# Patient Record
Sex: FEMALE | Race: WHITE | NOT HISPANIC OR LATINO | Employment: OTHER | RURAL
[De-identification: names, ages, dates, MRNs, and addresses within clinical notes are randomized per-mention and may not be internally consistent; named-entity substitution may affect disease eponyms.]

---

## 2021-06-03 ENCOUNTER — TRANSCRIBE ORDERS (OUTPATIENT)
Dept: PHYSICAL THERAPY | Facility: CLINIC | Age: 31
End: 2021-06-03

## 2021-06-03 DIAGNOSIS — M54.2 CERVICALGIA: Primary | ICD-10-CM

## 2021-06-03 DIAGNOSIS — M54.6 PAIN IN THORACIC SPINE: ICD-10-CM

## 2021-06-17 ENCOUNTER — TREATMENT (OUTPATIENT)
Dept: PHYSICAL THERAPY | Facility: CLINIC | Age: 31
End: 2021-06-17

## 2021-06-17 DIAGNOSIS — M54.6 PAIN IN THORACIC SPINE: ICD-10-CM

## 2021-06-17 DIAGNOSIS — M54.2 CERVICALGIA: Primary | ICD-10-CM

## 2021-06-17 PROCEDURE — 97162 PT EVAL MOD COMPLEX 30 MIN: CPT | Performed by: PHYSICAL THERAPIST

## 2021-06-17 NOTE — PROGRESS NOTES
Physical Therapy Initial Evaluation and Plan of Care      Patient: Emili Caal   : 1990  Diagnosis/ICD-10 Code:  Cervicalgia [M54.2]  Referring practitioner: Bj Duckworth MD  Date of Initial Visit: 2021  Today's Date: 2021  Patient seen for 1 sessions    Recertification: 2021   Next MD appt: TBD.         Subjective Questionnaire: NDI:25/50 = 50%  Oswestry: 28/50 = 56%      Subjective Evaluation    History of Present Illness  Onset date: Chronic, years.  Mechanism of injury: No known injury.    Subjective comment: Patient reprots tshe has had trouble with her neck and upper back for sometime. She reports she has trouble with her shoulders and neck. She rpeorts sleeping is the worst. She reprots she gets numbness in the dorsal arm. She reports that the R side is worse than the L. She reports leaning to the L is the worse.. She reprots  Patient Occupation: [employer]  Current pain rating: 3  At best pain rating: 3  At worst pain rating: 10  Location: neck and thoracic  Quality: tight  Relieving factors: heat  Aggravating factors: sleeping and prolonged positioning  Progression: worsening    Social Support  Lives in: apartment    Hand dominance: right    Diagnostic Tests  No diagnostic tests performed  Abnormal MRI: Low back only.    Treatments  No previous or current treatments  Patient Goals  Patient goals for therapy: decreased pain  Patient goal: To be able to get my MRI           Objective          Static Posture     Head  Forward.    Shoulders  Rounded.    Thoracic Spine  Hyperkyphosis.    Postural Observations  Seated posture: poor  Standing posture: poor        Palpation   Left   Hypertonic in the levator scapulae and upper trapezius.   Tenderness of the levator scapulae, suboccipitals and upper trapezius.     Right   Hypertonic in the levator scapulae and upper trapezius. Tenderness of the levator scapulae, suboccipitals and upper trapezius.     Tenderness    Cervical Spine   Tenderness in the pedicle.     Additional Tenderness Details  C7- very predominate with significant hump    Neurological Testing     Sensation   Cervical/Thoracic   Left   Intact: light touch    Right   Intact: light touch    Active Range of Motion   Cervical/Thoracic Spine   Cervical    Flexion: 20 degrees   Extension: 50 degrees   Left lateral flexion: 30 degrees   Right lateral flexion: 30 degrees   Left rotation: 55 degrees   Right rotation: 55 degrees     Strength/Myotome Testing   Cervical Spine   Neck extension: 4-  Neck flexion: 4-    Left   Neck lateral flexion (C3): 4-    Right   Neck lateral flexion (C3): 4-    Left Shoulder     Planes of Motion   Flexion: 5   Abduction: 5     Right Shoulder     Planes of Motion   Flexion: 5   Abduction: 5     Left Elbow   Flexion: 5  Extension: 5    Right Elbow   Flexion: 5  Extension: 5    Tests   Cervical     Left   Negative alar ligament integrity, active compression ([place]), cervical distraction, Spurling's sign and transverse ligament.     Right   Negative alar ligament integrity, active compression ([place]), cervical distraction, Spurling's sign and transverse ligament.     Ambulation     Comments   FWB, non-antalgic gait, no distress, no assistive device, no significant gait deviation, normal arm swing with gait.          Assessment & Plan     Assessment  Impairments: abnormal or restricted ROM, activity intolerance, impaired physical strength, lacks appropriate home exercise program and pain with function  Assessment details: Patient has chronic neck and upper back pain for years with poor overall posture. She has some mild tightness. In B UT and LS. UT >LS. She has poor overall posture and ergonomics as mainn issues. No laxiy in c-spine noted. Patient's insurance requires authorization prior to treatment beginning. Small HEP was established.  Prognosis: fair  Prognosis details: Barriers to Rehab: Include significant or possible  arthritic/degenerative changes that have occurred within the spine, The chronicity of this issue, The patient's obesity.    Safety Issues: None noted.    Functional Limitations: lifting, sleeping, pulling, pushing, uncomfortable because of pain, sitting and reaching overhead  Goals  Plan Goals: Short Term Goals:  1) I with HEP and have additions/changes by next re-certification.    2) Patient to be more aware of posture and posture correction technique.    3) AROM cervical flexion/ >=45°.    4) AROM B cervical SB >= 40°.    5) AROM B cervical ROT >=65°.    6) C-spine >= 4/5.      Long Term Goals:  1) AROM for the cervical spine all WNL, no increase in pain, ROT >= 75°.    2) C-spine 5/5, no increase in pain.    3) Patient able to perform 10 minutes of scapular stabilization exercises with good posture and no cueing to correct.    4) Patient able to control s/s with there ex.    5) NDI score of <= 34%.    6) patient able to show proper desk ergonomics and set up.    7) I with final HEP.        Plan  Therapy options: will be seen for skilled physical therapy services  Planned modality interventions: cryotherapy and thermotherapy (hydrocollator packs)  Planned therapy interventions: flexibility, body mechanics training, home exercise program, joint mobilization, manual therapy, neuromuscular re-education, postural training, soft tissue mobilization, spinal/joint mobilization, strengthening, stretching and therapeutic activities  Duration in visits: 10  Treatment plan discussed with: patient  Plan details: Progress overall ROM, strength, posture, scap stab, ergonomics, and activity tolerance.      Other therapeutic activities and/or exercises will be prescribed depending on the patients progress or lack there of.     Visit Diagnoses:    ICD-10-CM ICD-9-CM   1. Cervicalgia  M54.2 723.1   2. Pain in thoracic spine  M54.6 724.1       Timed:  Manual Therapy:         mins  43093;  Therapeutic Exercise:         mins  24414;       Neuromuscular Felicia:        mins  27702;    Therapeutic Activity:          mins  31803;     Gait Training:           mins  82584;     Ultrasound:          mins  83470;    Paraffin:        mins  17054;  Electrical Stimulation:         mins  46507 ( );    Untimed:  Electrical Stimulation:         mins  08502 ( );  Mechanical Traction:         mins  35002;     Timed Treatment:      mins   Total Treatment:     32   mins    PT SIGNATURE: Savana Bethea PT DPT, CSCS   DATE TREATMENT INITIATED: 6/17/2021    Initial Certification  Certification Period: 9/15/2021  I certify that the therapy services are furnished while this patient is under my care.  The services outlined above are required by this patient, and will be reviewed every 90 days.     PHYSICIAN: Bj Duckworth MD      DATE:     Please sign and return via fax to  .. Thank you, [facility].        This document has been electronically signed by Savana Bethea PT DPT, CSCS on June 17, 2021 09:26 CDT

## 2021-06-22 ENCOUNTER — TREATMENT (OUTPATIENT)
Dept: PHYSICAL THERAPY | Facility: CLINIC | Age: 31
End: 2021-06-22

## 2021-06-22 DIAGNOSIS — M54.6 PAIN IN THORACIC SPINE: ICD-10-CM

## 2021-06-22 DIAGNOSIS — M54.2 CERVICALGIA: Primary | ICD-10-CM

## 2021-06-22 PROCEDURE — 97530 THERAPEUTIC ACTIVITIES: CPT | Performed by: PHYSICAL THERAPIST

## 2021-06-22 PROCEDURE — 97110 THERAPEUTIC EXERCISES: CPT | Performed by: PHYSICAL THERAPIST

## 2021-06-22 NOTE — PROGRESS NOTES
Physical Therapy Daily Progress Note      Patient: Emili Caal   : 1990  Referring practitioner: Bj Duckworth MD  Date of Initial Visit: Type: THERAPY  Noted: 2021  Today's Date: 2021  Patient seen for 2 sessions    Recertification: 2021   Next MD appt: AFUAYoni Caal reports: soreness today.       Subjective Evaluation    History of Present Illness    Subjective comment: Pt relates feels soreness on R lateral neck, upper thoracic region.  States warm shower gives some pain relief.  Relates doin gHEP each day. Pain  Current pain rating: 3           Objective   See Exercise, Manual, and Modality Logs for complete treatment.       Assessment & Plan     Assessment  Assessment details: Pt gave good effort, motivated.     Pt performed all exercises and techniques without c/o increased pain.     Pt was given WHEP of exercises performed today. Pt related understanding of exercises, precautions, and progression parameters.     Goals  Plan Goals: Goals:  Short Term Goals:  1) I with HEP and have additions/changes by next re-certification.    2) Patient to be more aware of posture and posture correction technique.    3) AROM cervical flexion/ >=45°.    4) AROM B cervical SB >= 40°.    5) AROM B cervical ROT >=65°.    6) C-spine >= 4/5.      Long Term Goals:  1) AROM for the cervical spine all WNL, no increase in pain, ROT >= 75°.    2) C-spine 5/5, no increase in pain.    3) Patient able to perform 10 minutes of scapular stabilization exercises with good posture and no cueing to correct.    4) Patient able to control s/s with there ex.    5) NDI score of <= 34%.    6) patient able to show proper desk ergonomics and set up.    7) I with final HEP.        Visit Diagnoses:    ICD-10-CM ICD-9-CM   1. Cervicalgia  M54.2 723.1       Progress per Plan of Care           Timed:  Manual Therapy:         mins  07761;  Therapeutic Exercise:    25     mins  32619;     Neuromuscular Felicia:         mins  21778;    Therapeutic Activity:     10     mins  36258;     Gait Training:           mins  40503;     Ultrasound:          mins  12365;    Paraffin:        mins  15373;  Electrical Stimulation:         mins  58177 ( );    Untimed:  Electrical Stimulation:         mins  87395 ( );  Mechanical Traction:         mins  34320;     Timed Treatment:   40   mins   Total Treatment:     45   mins    Mary Barker PT  Physical Therapist        This document has been electronically signed by Mary Barker PT on June 22, 2021 17:09 CDT

## 2021-06-24 ENCOUNTER — TREATMENT (OUTPATIENT)
Dept: PHYSICAL THERAPY | Facility: CLINIC | Age: 31
End: 2021-06-24

## 2021-06-24 DIAGNOSIS — M54.2 CERVICALGIA: Primary | ICD-10-CM

## 2021-06-24 DIAGNOSIS — M54.6 PAIN IN THORACIC SPINE: ICD-10-CM

## 2021-06-24 PROCEDURE — 97110 THERAPEUTIC EXERCISES: CPT | Performed by: PHYSICAL THERAPIST

## 2021-06-24 NOTE — PROGRESS NOTES
"   Physical Therapy Daily Progress Note      Patient: Emili Caal   : 1990  Referring practitioner: Bj Duckwroth MD  Date of Initial Visit: Type: THERAPY  Noted: 2021  Today's Date: 2021  Patient seen for 3 sessions           Emili Caal reports: feeling better.          Subjective Evaluation    History of Present Illness    Subjective comment: States stiff this morning, just woke up.  States HEP going well. After visit today, pt relates feel sbetter \" than when i came in \". Pain  Current pain rating: 3           Objective   See Exercise, Manual, and Modality Logs for complete treatment.       Assessment & Plan     Assessment  Assessment details: Good effort, motivated.     Pt performed stabilization exercise well, needed Vc/MC for form and technique, but improved as practiced.       Improved posture.     Pt compliant with HEP.    Goals  Plan Goals:   Goals    Short Term Goals:  1) I with HEP and have additions/changes by next re-certification.(ONGOING)    2) Patient to be more aware of posture and posture correction technique. (ONGOING)    3) AROM cervical flexion/ >=45°.(MET)    4) AROM B cervical SB >= 40°.    5) AROM B cervical ROT >=65°.    6) C-spine >= 4/5.      Long Term Goals:  1) AROM for the cervical spine all WNL, no increase in pain, ROT >= 75°.    2) C-spine 5/5, no increase in pain.    3) Patient able to perform 10 minutes of scapular stabilization exercises with good posture and no cueing to correct. (PROGRESSING)    4) Patient able to control s/s with ther ex. (ONGOING)    5) NDI score of <= 34%.    6) patient able to show proper desk ergonomics and set up.    7) I with final HEP.    Plan  Plan details: Progress HEP.         Visit Diagnoses:    ICD-10-CM ICD-9-CM   1. Cervicalgia  M54.2 723.1   2. Pain in thoracic spine  M54.6 724.1       Progress per Plan of Care           Timed:  Manual Therapy:         mins  20726;  Therapeutic Exercise:    35     mins  73702;   "   Neuromuscular Felicia:        mins  68040;    Therapeutic Activity:          mins  79446;     Gait Training:           mins  64546;     Ultrasound:          mins  32803;    Paraffin:        mins  39357;  Electrical Stimulation:         mins  57519 ( );    Untimed:  Electrical Stimulation:         mins  39557 ( );  Mechanical Traction:         mins  50420;     Timed Treatment:   35   mins   Total Treatment:     40   mins    Mary Barker, JEFF  Physical Therapist        This document has been electronically signed by Mary Barker PT on June 24, 2021 10:17 CDT

## 2021-06-29 ENCOUNTER — TELEPHONE (OUTPATIENT)
Dept: PHYSICAL THERAPY | Facility: CLINIC | Age: 31
End: 2021-06-29

## 2021-07-02 ENCOUNTER — TREATMENT (OUTPATIENT)
Dept: PHYSICAL THERAPY | Facility: CLINIC | Age: 31
End: 2021-07-02

## 2021-07-02 DIAGNOSIS — M54.6 PAIN IN THORACIC SPINE: ICD-10-CM

## 2021-07-02 DIAGNOSIS — M54.2 CERVICALGIA: Primary | ICD-10-CM

## 2021-07-02 PROCEDURE — 97110 THERAPEUTIC EXERCISES: CPT | Performed by: PHYSICAL THERAPIST

## 2021-07-02 PROCEDURE — 97140 MANUAL THERAPY 1/> REGIONS: CPT | Performed by: PHYSICAL THERAPIST

## 2021-07-02 NOTE — PROGRESS NOTES
Physical Therapy Daily Treatment Note      Patient: Emili Caal   : 1990  Referring practitioner: Bj Duckworth MD  Date of Initial Visit: Type: THERAPY  Noted: 2021  Today's Date: 2021  Patient seen for 4 sessions    Recert due:  21  MD followup:  TBD     Emili Caal reports:   Subjective Questionnaire:       Subjective  Reports pre RX pain in neck and shoulders at 4/10.     Objective   See Exercise, Manual, and Modality Logs for complete treatment.       Assessment & Plan     Assessment  Assessment details: Pt did well with existing HEP.  New T Band shoulder ext & no money was added.  Responded well to manual RX.  Requires cues to reduce forward head posture.     Goals  Plan Goals: Short Term Goals:  1) I with HEP and have additions/changes by next re-certification.(ONGOING)    2) Patient to be more aware of posture and posture correction technique. (ONGOING)    3) AROM cervical flexion/ >=45°.(MET)    4) AROM B cervical SB >= 40°.    5) AROM B cervical ROT >=65°.    6) C-spine >= 4/5.      Long Term Goals:  1) AROM for the cervical spine all WNL, no increase in pain, ROT >= 75°.    2) C-spine 5/5, no increase in pain.    3) Patient able to perform 10 minutes of scapular stabilization exercises with good posture and no cueing to correct. (PROGRESSING)    4) Patient able to control s/s with ther ex. (ONGOING)    5) NDI score of <= 34%.    6) patient able to show proper desk ergonomics and set up.    7) I with final HEP.    Plan  Duration in visits: 10  Plan details: Try supine chin tucks        Visit Diagnoses:    ICD-10-CM ICD-9-CM   1. Cervicalgia  M54.2 723.1   2. Pain in thoracic spine  M54.6 724.1                  Timed:  Manual Therapy:    10     mins  87118;  Therapeutic Exercise:    35     mins  43221;     Neuromuscular Felicia:        mins  05502;    Therapeutic Activity:          mins  22314;     Gait Training:           mins  81303;     Ultrasound:          mins  36700;     Electrical Stimulation:         mins  74723 ( );    Untimed:  Electrical Stimulation:         mins  85886 ( );  Mechanical Traction:         mins  82193;     Timed Treatment:  45    mins   Total Treatment:    45    mins  Ann Cardenas PTA  Physical Therapist Assistant

## 2021-07-08 ENCOUNTER — TELEPHONE (OUTPATIENT)
Dept: PHYSICAL THERAPY | Facility: CLINIC | Age: 31
End: 2021-07-08

## 2021-07-12 ENCOUNTER — TELEPHONE (OUTPATIENT)
Dept: PHYSICAL THERAPY | Facility: CLINIC | Age: 31
End: 2021-07-12

## 2021-07-12 DIAGNOSIS — M54.2 CERVICALGIA: Primary | ICD-10-CM

## 2021-07-12 DIAGNOSIS — M54.6 PAIN IN THORACIC SPINE: ICD-10-CM

## 2021-10-04 ENCOUNTER — HOSPITAL ENCOUNTER (INPATIENT)
Facility: HOSPITAL | Age: 31
LOS: 7 days | Discharge: HOME HEALTH | End: 2021-10-11
Attending: INTERNAL MEDICINE | Admitting: INTERNAL MEDICINE

## 2021-10-04 DIAGNOSIS — E11.10 DIABETIC KETOACIDOSIS WITHOUT COMA ASSOCIATED WITH TYPE 2 DIABETES MELLITUS (HCC): Primary | ICD-10-CM

## 2021-10-04 PROBLEM — N39.0 ACUTE UTI (URINARY TRACT INFECTION): Status: ACTIVE | Noted: 2021-10-04

## 2021-10-04 PROBLEM — A41.9 SEPSIS, UNSPECIFIED ORGANISM: Status: ACTIVE | Noted: 2021-10-04

## 2021-10-04 LAB
ALBUMIN SERPL-MCNC: 4.8 G/DL (ref 3.5–5.2)
ALBUMIN/GLOB SERPL: 1.6 G/DL
ALP SERPL-CCNC: 155 U/L (ref 39–117)
ALT SERPL W P-5'-P-CCNC: 20 U/L (ref 1–33)
ANION GAP SERPL CALCULATED.3IONS-SCNC: 27 MMOL/L (ref 5–15)
ANION GAP SERPL CALCULATED.3IONS-SCNC: 27 MMOL/L (ref 5–15)
AST SERPL-CCNC: 22 U/L (ref 1–32)
ATMOSPHERIC PRESS: 747 MMHG
B-HCG UR QL: NEGATIVE
BACTERIA UR QL AUTO: ABNORMAL /HPF
BASE EXCESS BLDV CALC-SCNC: -28.5 MMOL/L (ref 0–2)
BASOPHILS # BLD AUTO: 0.16 10*3/MM3 (ref 0–0.2)
BASOPHILS NFR BLD AUTO: 0.5 % (ref 0–1.5)
BDY SITE: ABNORMAL
BILIRUB SERPL-MCNC: 0.2 MG/DL (ref 0–1.2)
BILIRUB UR QL STRIP: NEGATIVE
BUN SERPL-MCNC: 17 MG/DL (ref 6–20)
BUN SERPL-MCNC: 18 MG/DL (ref 6–20)
BUN/CREAT SERPL: 15.5 (ref 7–25)
BUN/CREAT SERPL: 16.7 (ref 7–25)
CALCIUM SPEC-SCNC: 9.3 MG/DL (ref 8.6–10.5)
CALCIUM SPEC-SCNC: 9.5 MG/DL (ref 8.6–10.5)
CHLORIDE SERPL-SCNC: 106 MMOL/L (ref 98–107)
CHLORIDE SERPL-SCNC: 113 MMOL/L (ref 98–107)
CLARITY UR: ABNORMAL
CO2 SERPL-SCNC: 2 MMOL/L (ref 22–29)
CO2 SERPL-SCNC: 4 MMOL/L (ref 22–29)
COLOR UR: YELLOW
CREAT SERPL-MCNC: 1.02 MG/DL (ref 0.57–1)
CREAT SERPL-MCNC: 1.16 MG/DL (ref 0.57–1)
D-DIMER, QUANTITATIVE (MAD,POW, STR): 572 NG/ML (FEU) (ref 0–470)
D-LACTATE SERPL-SCNC: 1.3 MMOL/L (ref 0.5–2)
DEPRECATED RDW RBC AUTO: 43.5 FL (ref 37–54)
EOSINOPHIL # BLD AUTO: 0 10*3/MM3 (ref 0–0.4)
EOSINOPHIL # BLD MANUAL: 0.3 10*3/MM3 (ref 0–0.4)
EOSINOPHIL NFR BLD AUTO: 0 % (ref 0.3–6.2)
EOSINOPHIL NFR BLD MANUAL: 1 % (ref 0.3–6.2)
ERYTHROCYTE [DISTWIDTH] IN BLOOD BY AUTOMATED COUNT: 13.3 % (ref 12.3–15.4)
GFR SERPL CREATININE-BSD FRML MDRD: 56 ML/MIN/1.73
GFR SERPL CREATININE-BSD FRML MDRD: 65 ML/MIN/1.73
GLOBULIN UR ELPH-MCNC: 3 GM/DL
GLUCOSE SERPL-MCNC: 214 MG/DL (ref 65–99)
GLUCOSE SERPL-MCNC: 318 MG/DL (ref 65–99)
GLUCOSE UR STRIP-MCNC: ABNORMAL MG/DL
HBA1C MFR BLD: 13.1 % (ref 4.8–5.6)
HCO3 BLDV-SCNC: 2.1 MMOL/L (ref 18–23)
HCT VFR BLD AUTO: 51 % (ref 34–46.6)
HGB BLD-MCNC: 17.1 G/DL (ref 12–15.9)
HGB UR QL STRIP.AUTO: ABNORMAL
HYALINE CASTS UR QL AUTO: ABNORMAL /LPF
IMM GRANULOCYTES # BLD AUTO: 1.06 10*3/MM3 (ref 0–0.05)
IMM GRANULOCYTES NFR BLD AUTO: 3.5 % (ref 0–0.5)
KETONES UR QL STRIP: ABNORMAL
LEUKOCYTE ESTERASE UR QL STRIP.AUTO: NEGATIVE
LYMPHOCYTES # BLD AUTO: 2.63 10*3/MM3 (ref 0.7–3.1)
LYMPHOCYTES # BLD MANUAL: 3 10*3/MM3 (ref 0.7–3.1)
LYMPHOCYTES NFR BLD AUTO: 8.8 % (ref 19.6–45.3)
LYMPHOCYTES NFR BLD MANUAL: 10 % (ref 19.6–45.3)
LYMPHOCYTES NFR BLD MANUAL: 10 % (ref 5–12)
Lab: ABNORMAL
MAGNESIUM SERPL-MCNC: 2.4 MG/DL (ref 1.6–2.6)
MAGNESIUM SERPL-MCNC: 2.8 MG/DL (ref 1.6–2.6)
MCH RBC QN AUTO: 30.6 PG (ref 26.6–33)
MCHC RBC AUTO-ENTMCNC: 33.5 G/DL (ref 31.5–35.7)
MCV RBC AUTO: 91.4 FL (ref 79–97)
METAMYELOCYTES NFR BLD MANUAL: 1 % (ref 0–0)
MODALITY: ABNORMAL
MONOCYTES # BLD AUTO: 3 10*3/MM3 (ref 0.1–0.9)
MONOCYTES # BLD AUTO: 3.04 10*3/MM3 (ref 0.1–0.9)
MONOCYTES NFR BLD AUTO: 10.1 % (ref 5–12)
NEUTROPHILS # BLD AUTO: 23.41 10*3/MM3 (ref 1.7–7)
NEUTROPHILS NFR BLD AUTO: 23.12 10*3/MM3 (ref 1.7–7)
NEUTROPHILS NFR BLD AUTO: 77.1 % (ref 42.7–76)
NEUTROPHILS NFR BLD MANUAL: 71 % (ref 42.7–76)
NEUTS BAND NFR BLD MANUAL: 7 % (ref 0–5)
NITRITE UR QL STRIP: NEGATIVE
NRBC BLD AUTO-RTO: 0.1 /100 WBC (ref 0–0.2)
OSMOLALITY SERPL: 333 MOSM/KG (ref 275–295)
PCO2 BLDV: 9.8 MM HG (ref 41–51)
PH BLDV: 6.94 PH UNITS (ref 7.29–7.37)
PH UR STRIP.AUTO: <=5 [PH] (ref 5–9)
PHOSPHATE SERPL-MCNC: 2 MG/DL (ref 2.5–4.5)
PHOSPHATE SERPL-MCNC: 2.8 MG/DL (ref 2.5–4.5)
PLAT MORPH BLD: NORMAL
PLATELET # BLD AUTO: 265 10*3/MM3 (ref 140–450)
PMV BLD AUTO: 12 FL (ref 6–12)
PO2 BLDV: 143 MM HG (ref 27–53)
POTASSIUM SERPL-SCNC: 4.8 MMOL/L (ref 3.5–5.2)
POTASSIUM SERPL-SCNC: 5.2 MMOL/L (ref 3.5–5.2)
PROT SERPL-MCNC: 7.8 G/DL (ref 6–8.5)
PROT UR QL STRIP: ABNORMAL
RBC # BLD AUTO: 5.58 10*6/MM3 (ref 3.77–5.28)
RBC # UR: ABNORMAL /HPF
RBC MORPH BLD: NORMAL
REF LAB TEST METHOD: ABNORMAL
SAO2 % BLDCOV: 99.1 % (ref 45–75)
SODIUM SERPL-SCNC: 137 MMOL/L (ref 136–145)
SODIUM SERPL-SCNC: 142 MMOL/L (ref 136–145)
SP GR UR STRIP: 1.02 (ref 1–1.03)
SQUAMOUS #/AREA URNS HPF: ABNORMAL /HPF
UROBILINOGEN UR QL STRIP: ABNORMAL
VENTILATOR MODE: ABNORMAL
WBC # BLD AUTO: 30.01 10*3/MM3 (ref 3.4–10.8)
WBC MORPH BLD: NORMAL
WBC UR QL AUTO: ABNORMAL /HPF

## 2021-10-04 PROCEDURE — 25010000003 POTASSIUM CHLORIDE PER 2 MEQ: Performed by: INTERNAL MEDICINE

## 2021-10-04 PROCEDURE — 83036 HEMOGLOBIN GLYCOSYLATED A1C: CPT | Performed by: INTERNAL MEDICINE

## 2021-10-04 PROCEDURE — 25010000002 ENOXAPARIN PER 10 MG: Performed by: INTERNAL MEDICINE

## 2021-10-04 PROCEDURE — 85379 FIBRIN DEGRADATION QUANT: CPT | Performed by: INTERNAL MEDICINE

## 2021-10-04 PROCEDURE — 83605 ASSAY OF LACTIC ACID: CPT | Performed by: INTERNAL MEDICINE

## 2021-10-04 PROCEDURE — 25010000003 INSULIN REGULAR HUMAN PER 5 UNITS: Performed by: INTERNAL MEDICINE

## 2021-10-04 PROCEDURE — 83930 ASSAY OF BLOOD OSMOLALITY: CPT | Performed by: INTERNAL MEDICINE

## 2021-10-04 PROCEDURE — 81001 URINALYSIS AUTO W/SCOPE: CPT | Performed by: INTERNAL MEDICINE

## 2021-10-04 PROCEDURE — 82962 GLUCOSE BLOOD TEST: CPT

## 2021-10-04 PROCEDURE — 84100 ASSAY OF PHOSPHORUS: CPT | Performed by: INTERNAL MEDICINE

## 2021-10-04 PROCEDURE — 82803 BLOOD GASES ANY COMBINATION: CPT

## 2021-10-04 PROCEDURE — 87040 BLOOD CULTURE FOR BACTERIA: CPT | Performed by: INTERNAL MEDICINE

## 2021-10-04 PROCEDURE — 85025 COMPLETE CBC W/AUTO DIFF WBC: CPT | Performed by: INTERNAL MEDICINE

## 2021-10-04 PROCEDURE — 0202U NFCT DS 22 TRGT SARS-COV-2: CPT | Performed by: INTERNAL MEDICINE

## 2021-10-04 PROCEDURE — 81025 URINE PREGNANCY TEST: CPT | Performed by: INTERNAL MEDICINE

## 2021-10-04 PROCEDURE — 80053 COMPREHEN METABOLIC PANEL: CPT | Performed by: INTERNAL MEDICINE

## 2021-10-04 PROCEDURE — 25010000002 CEFTRIAXONE PER 250 MG

## 2021-10-04 PROCEDURE — 85007 BL SMEAR W/DIFF WBC COUNT: CPT | Performed by: INTERNAL MEDICINE

## 2021-10-04 PROCEDURE — 83735 ASSAY OF MAGNESIUM: CPT | Performed by: INTERNAL MEDICINE

## 2021-10-04 RX ORDER — SODIUM CHLORIDE 9 MG/ML
10 INJECTION, SOLUTION INTRAVENOUS CONTINUOUS PRN
Status: DISCONTINUED | OUTPATIENT
Start: 2021-10-04 | End: 2021-10-08

## 2021-10-04 RX ORDER — DEXTROSE, SODIUM CHLORIDE, AND POTASSIUM CHLORIDE 5; .45; .3 G/100ML; G/100ML; G/100ML
150 INJECTION INTRAVENOUS CONTINUOUS PRN
Status: DISCONTINUED | OUTPATIENT
Start: 2021-10-04 | End: 2021-10-08

## 2021-10-04 RX ORDER — SODIUM CHLORIDE 9 MG/ML
250 INJECTION, SOLUTION INTRAVENOUS CONTINUOUS PRN
Status: DISCONTINUED | OUTPATIENT
Start: 2021-10-04 | End: 2021-10-08

## 2021-10-04 RX ORDER — SODIUM CHLORIDE 0.9 % (FLUSH) 0.9 %
10 SYRINGE (ML) INJECTION ONCE AS NEEDED
Status: COMPLETED | OUTPATIENT
Start: 2021-10-04 | End: 2021-10-07

## 2021-10-04 RX ORDER — SODIUM CHLORIDE 0.9 % (FLUSH) 0.9 %
10 SYRINGE (ML) INJECTION EVERY 12 HOURS SCHEDULED
Status: DISCONTINUED | OUTPATIENT
Start: 2021-10-04 | End: 2021-10-11 | Stop reason: HOSPADM

## 2021-10-04 RX ORDER — SODIUM CHLORIDE 0.9 % (FLUSH) 0.9 %
10 SYRINGE (ML) INJECTION AS NEEDED
Status: DISCONTINUED | OUTPATIENT
Start: 2021-10-04 | End: 2021-10-11 | Stop reason: HOSPADM

## 2021-10-04 RX ORDER — ONDANSETRON 2 MG/ML
4 INJECTION INTRAMUSCULAR; INTRAVENOUS EVERY 6 HOURS PRN
Status: DISCONTINUED | OUTPATIENT
Start: 2021-10-04 | End: 2021-10-11 | Stop reason: HOSPADM

## 2021-10-04 RX ORDER — DEXTROSE AND SODIUM CHLORIDE 5; .9 G/100ML; G/100ML
150 INJECTION, SOLUTION INTRAVENOUS CONTINUOUS PRN
Status: DISCONTINUED | OUTPATIENT
Start: 2021-10-04 | End: 2021-10-08

## 2021-10-04 RX ORDER — CYCLOBENZAPRINE HCL 10 MG
10 TABLET ORAL DAILY
COMMUNITY
End: 2022-09-02

## 2021-10-04 RX ORDER — DEXTROSE MONOHYDRATE 25 G/50ML
12.5 INJECTION, SOLUTION INTRAVENOUS AS NEEDED
Status: DISCONTINUED | OUTPATIENT
Start: 2021-10-04 | End: 2021-10-08

## 2021-10-04 RX ORDER — SIMVASTATIN 10 MG
10 TABLET ORAL NIGHTLY
COMMUNITY

## 2021-10-04 RX ORDER — POTASSIUM CHLORIDE, DEXTROSE MONOHYDRATE AND SODIUM CHLORIDE 300; 5; 900 MG/100ML; G/100ML; MG/100ML
150 INJECTION, SOLUTION INTRAVENOUS CONTINUOUS PRN
Status: DISCONTINUED | OUTPATIENT
Start: 2021-10-04 | End: 2021-10-08

## 2021-10-04 RX ORDER — DEXTROSE, SODIUM CHLORIDE, AND POTASSIUM CHLORIDE 5; .45; .15 G/100ML; G/100ML; G/100ML
150 INJECTION INTRAVENOUS CONTINUOUS PRN
Status: DISCONTINUED | OUTPATIENT
Start: 2021-10-04 | End: 2021-10-08

## 2021-10-04 RX ORDER — CLONIDINE HYDROCHLORIDE 0.1 MG/1
0.1 TABLET ORAL DAILY
COMMUNITY
End: 2022-09-02 | Stop reason: SDUPTHER

## 2021-10-04 RX ORDER — MORPHINE SULFATE 2 MG/ML
2 INJECTION, SOLUTION INTRAMUSCULAR; INTRAVENOUS
Status: DISPENSED | OUTPATIENT
Start: 2021-10-04 | End: 2021-10-11

## 2021-10-04 RX ORDER — DICYCLOMINE HCL 20 MG
20 TABLET ORAL EVERY 6 HOURS
COMMUNITY
End: 2021-10-29

## 2021-10-04 RX ORDER — DEXTROSE AND SODIUM CHLORIDE 5; .45 G/100ML; G/100ML
150 INJECTION, SOLUTION INTRAVENOUS CONTINUOUS PRN
Status: DISCONTINUED | OUTPATIENT
Start: 2021-10-04 | End: 2021-10-08

## 2021-10-04 RX ORDER — SODIUM CHLORIDE 450 MG/100ML
250 INJECTION, SOLUTION INTRAVENOUS CONTINUOUS PRN
Status: DISCONTINUED | OUTPATIENT
Start: 2021-10-04 | End: 2021-10-08

## 2021-10-04 RX ORDER — SODIUM CHLORIDE AND POTASSIUM CHLORIDE 150; 900 MG/100ML; MG/100ML
250 INJECTION, SOLUTION INTRAVENOUS CONTINUOUS PRN
Status: DISCONTINUED | OUTPATIENT
Start: 2021-10-04 | End: 2021-10-08

## 2021-10-04 RX ORDER — LISINOPRIL 20 MG/1
20 TABLET ORAL DAILY
COMMUNITY
End: 2021-10-29

## 2021-10-04 RX ORDER — DEXTROSE, SODIUM CHLORIDE, AND POTASSIUM CHLORIDE 5; .9; .15 G/100ML; G/100ML; G/100ML
150 INJECTION INTRAVENOUS CONTINUOUS PRN
Status: DISCONTINUED | OUTPATIENT
Start: 2021-10-04 | End: 2021-10-08

## 2021-10-04 RX ORDER — LORAZEPAM 2 MG/ML
1 INJECTION INTRAMUSCULAR EVERY 4 HOURS PRN
Status: DISCONTINUED | OUTPATIENT
Start: 2021-10-04 | End: 2021-10-11 | Stop reason: HOSPADM

## 2021-10-04 RX ORDER — NALOXONE HCL 0.4 MG/ML
0.4 VIAL (ML) INJECTION
Status: DISCONTINUED | OUTPATIENT
Start: 2021-10-04 | End: 2021-10-11 | Stop reason: HOSPADM

## 2021-10-04 RX ORDER — ESCITALOPRAM OXALATE 10 MG/1
10 TABLET ORAL DAILY
COMMUNITY
End: 2023-02-08

## 2021-10-04 RX ORDER — ACETAMINOPHEN 325 MG/1
650 TABLET ORAL EVERY 4 HOURS PRN
Status: DISCONTINUED | OUTPATIENT
Start: 2021-10-04 | End: 2021-10-11 | Stop reason: HOSPADM

## 2021-10-04 RX ORDER — SODIUM CHLORIDE AND POTASSIUM CHLORIDE 150; 450 MG/100ML; MG/100ML
250 INJECTION, SOLUTION INTRAVENOUS CONTINUOUS PRN
Status: DISCONTINUED | OUTPATIENT
Start: 2021-10-04 | End: 2021-10-11 | Stop reason: HOSPADM

## 2021-10-04 RX ORDER — SODIUM CHLORIDE 0.9 % (FLUSH) 0.9 %
3 SYRINGE (ML) INJECTION EVERY 12 HOURS SCHEDULED
Status: DISCONTINUED | OUTPATIENT
Start: 2021-10-04 | End: 2021-10-04

## 2021-10-04 RX ORDER — SODIUM CHLORIDE AND POTASSIUM CHLORIDE 300; 900 MG/100ML; MG/100ML
250 INJECTION, SOLUTION INTRAVENOUS CONTINUOUS PRN
Status: DISCONTINUED | OUTPATIENT
Start: 2021-10-04 | End: 2021-10-08

## 2021-10-04 RX ADMIN — POTASSIUM CHLORIDE, DEXTROSE MONOHYDRATE AND SODIUM CHLORIDE 150 ML/HR: 150; 5; 450 INJECTION, SOLUTION INTRAVENOUS at 23:52

## 2021-10-04 RX ADMIN — CEFTRIAXONE SODIUM 2 G: 2 INJECTION, POWDER, FOR SOLUTION INTRAMUSCULAR; INTRAVENOUS at 18:04

## 2021-10-04 RX ADMIN — SODIUM BICARBONATE 50 MEQ: 84 INJECTION INTRAVENOUS at 22:55

## 2021-10-04 RX ADMIN — ENOXAPARIN SODIUM 40 MG: 40 INJECTION SUBCUTANEOUS at 21:18

## 2021-10-04 RX ADMIN — POTASSIUM CHLORIDE AND SODIUM CHLORIDE 250 ML/HR: 450; 150 INJECTION, SOLUTION INTRAVENOUS at 19:48

## 2021-10-04 RX ADMIN — SODIUM CHLORIDE 10 UNITS/HR: 9 INJECTION, SOLUTION INTRAVENOUS at 18:36

## 2021-10-04 RX ADMIN — SODIUM CHLORIDE 1000 ML: 9 INJECTION, SOLUTION INTRAVENOUS at 17:41

## 2021-10-04 NOTE — PLAN OF CARE
Goal Outcome Evaluation:  Plan of Care Reviewed With: patient        Progress: no change  Outcome Summary: new admit from [facility], labs pending

## 2021-10-04 NOTE — H&P
[facility] Medicine Services  INPATIENT HISTORY AND PHYSICAL       Patient Care Team:  Bj Duckworth MD as PCP - General (Pulmonary Disease)      Date of Admission: 10/4/2021      Chief complaint Dysuria, abdominal pain and confusion    Subjective     Patient is a 30 y.o. female with a past medical history of type 2 diabetes mellitus with noncompliance, essential hypertension, anxiety and PTSD.  She presents with complaints of dysuria, abdominal pain and vomiting of 2 days duration along with worsening confusion today.    Patient states she has been noncompliant with her diabetic medication of Steglatro recently.  Secondary stop taking it due to some abdominal pain and discomfort she was feeling.  Her abdominal pain, nausea and vomiting worsened over the last 2 days and she had some dysuria as well.  She denies fevers or chills.  However due to her worsening symptoms and confusion she presented to [facility] today.  She has some tachycardia.  If venous blood gas sample was consistent with acidosis with pH of 6.87 and bicarb of 4.  Urinalysis had ketones and a BMP was consistent with DKA with bicarb of 6.  Chest x-ray did not show any acute abnormalities and rapid Covid screen was negative.  She was sent to [facility] for further evaluation.    At the time of my review patient is alert and oriented x3 but lethargic.  She complains of some abdominal pain that is vague and generalized as well as dysuria.    Review of Systems   Constitutional: Negative for activity change, appetite change, chills, fatigue and fever.   HENT: Negative for congestion, ear pain, rhinorrhea, sore throat and trouble swallowing.    Respiratory: Negative for cough, chest tightness, shortness of breath and wheezing.    Cardiovascular: Negative for chest pain, palpitations and leg swelling.   Gastrointestinal: Positive for abdominal pain, nausea and vomiting.  Negative for abdominal distention and diarrhea.   Genitourinary: Positive for dysuria. Negative for difficulty urinating and hematuria.   Musculoskeletal: Negative for arthralgias, back pain and myalgias.   Skin: Negative for pallor and rash.   Neurological: Positive for weakness. Negative for dizziness, syncope, light-headedness and headaches.   Hematological: Negative for adenopathy. Does not bruise/bleed easily.   Psychiatric/Behavioral: Positive for decreased concentration. Negative for agitation and confusion. The patient is not nervous/anxious.      History  Past Medical History:   Diagnosis Date   • Allergic    • Anxiety    • Asthma    • Chronic diarrhea    • Depression    • Diabetes mellitus (HCC)    • High blood pressure    • Hyperlipidemia    • Liver disease    • PTSD (post-traumatic stress disorder)      History reviewed. No pertinent surgical history.  Family History   Problem Relation Age of Onset   • Liver disease Mother    • Liver disease Father      Social History     Tobacco Use   • Smoking status: Current Every Day Smoker     Packs/day: 0.50     Types: Cigarettes   • Smokeless tobacco: Never Used   Vaping Use   • Vaping Use: Former   Substance Use Topics   • Alcohol use: Never   • Drug use: Not Currently     Medications Prior to Admission   Medication Sig Dispense Refill Last Dose   • cloNIDine (CATAPRES) 0.1 MG tablet Take 0.1 mg by mouth Daily.      • cyclobenzaprine (FLEXERIL) 10 MG tablet Take 10 mg by mouth Daily.      • dicyclomine (BENTYL) 20 MG tablet Take 20 mg by mouth Every 6 (Six) Hours.      • Ertugliflozin L-PyroglutamicAc (Steglatro) 15 MG tablet Take 15 mg by mouth Every Morning.      • escitalopram (LEXAPRO) 10 MG tablet Take 10 mg by mouth Daily.      • lisinopril (PRINIVIL,ZESTRIL) 20 MG tablet Take 20 mg by mouth Daily.      • simvastatin (ZOCOR) 20 MG tablet Take 20 mg by mouth Every Night.        Allergies:  Hydroxyquinolines  Prior to Admission medications    Medication Sig  Start Date End Date Taking? Authorizing Provider   cloNIDine (CATAPRES) 0.1 MG tablet Take 0.1 mg by mouth Daily.   Yes Nate Domingo MD   cyclobenzaprine (FLEXERIL) 10 MG tablet Take 10 mg by mouth Daily.   Yes Nate Domingo MD   dicyclomine (BENTYL) 20 MG tablet Take 20 mg by mouth Every 6 (Six) Hours.   Yes Nate Domingo MD   Ertugliflozin L-PyroglutamicAc (Steglatro) 15 MG tablet Take 15 mg by mouth Every Morning.   Yes Nate Domingo MD   escitalopram (LEXAPRO) 10 MG tablet Take 10 mg by mouth Daily.   Yes Nate Domingo MD   lisinopril (PRINIVIL,ZESTRIL) 20 MG tablet Take 20 mg by mouth Daily.   Yes Nate Domingo MD   simvastatin (ZOCOR) 20 MG tablet Take 20 mg by mouth Every Night.   Yes Nate Domingo MD       I have reviewed the patient's current medications    Objective        Vital Signs  Temp:  [93.3 °F (34.1 °C)-95.9 °F (35.5 °C)] 95.9 °F (35.5 °C)  Heart Rate:  [106-121] 119  Resp:  [22] 22  BP: (125-149)/(65-80) 145/67      Physical Exam  Constitutional:       General: She is not in acute distress.     Appearance: She is ill-appearing. She is not diaphoretic.   HENT:      Head: Normocephalic and atraumatic.      Right Ear: External ear normal.      Left Ear: External ear normal.      Nose: No congestion or rhinorrhea.      Mouth/Throat:      Mouth: Mucous membranes are dry.      Pharynx: No oropharyngeal exudate or posterior oropharyngeal erythema.   Eyes:      General: No scleral icterus.     Extraocular Movements: Extraocular movements intact.      Conjunctiva/sclera: Conjunctivae normal.   Cardiovascular:      Rate and Rhythm: Normal rate and regular rhythm.      Heart sounds: Normal heart sounds. No murmur heard.     Pulmonary:      Effort: Pulmonary effort is normal. No respiratory distress.      Breath sounds: Normal breath sounds. No wheezing, rhonchi or rales.   Abdominal:      General: Abdomen is flat. There is no distension.       Palpations: Abdomen is soft.      Tenderness: There is abdominal tenderness. There is no guarding.      Comments: Vague generalized tenderness.   Musculoskeletal:         General: No swelling, tenderness or deformity.      Cervical back: Neck supple. No rigidity. No muscular tenderness.      Right lower leg: No edema.      Left lower leg: No edema.   Lymphadenopathy:      Cervical: No cervical adenopathy.   Skin:     General: Skin is warm and dry.   Neurological:      General: No focal deficit present.      Mental Status: She is alert and oriented to person, place, and time.      Cranial Nerves: No cranial nerve deficit.      Motor: No weakness.      Comments: Drowsy and lethargic   Psychiatric:         Mood and Affect: Mood normal.         Behavior: Behavior normal.         Thought Content: Thought content normal.       Results Review:     Results from last 7 days   Lab Units 10/04/21  2007 10/04/21  1803   SODIUM mmol/L 142 137   POTASSIUM mmol/L 4.8 5.2   CHLORIDE mmol/L 113* 106   CO2 mmol/L 2.0* 4.0*   BUN mg/dL 17 18   CREATININE mg/dL 1.02* 1.16*   GLUCOSE mg/dL 214* 318*   CALCIUM mg/dL 9.3 9.5   BILIRUBIN mg/dL 0.2  --    ALK PHOS U/L 155*  --    ALT (SGPT) U/L 20  --    AST (SGOT) U/L 22  --        Results from last 7 days   Lab Units 10/04/21  2007 10/04/21  1803   MAGNESIUM mg/dL 2.4 2.8*   PHOSPHORUS mg/dL 2.0* 2.8       Results from last 7 days   Lab Units 10/04/21  1800   WBC 10*3/mm3 30.01*   HEMOGLOBIN g/dL 17.1*   HEMATOCRIT % 51.0*   PLATELETS 10*3/mm3 265           Imaging Results (Last 7 Days)     ** No results found for the last 168 hours. **          Assessment / Plan       Hospital Problem List:  Principal Problem:    DKA (diabetic ketoacidosis) ([facility])  Active Problems:    Acute UTI (urinary tract infection)    Sepsis, unspecified organism (HCC)  Essential hypertension  Metabolic acidosis    Plan  -Patient has DKA which is likely due to combination of urinary tract infection, sepsis and SGLT2  inhibitor  -We will start patient on IV fluids and IV insulin as per DKA protocol  -BMP every 4 hours and monitor for closure of anion gap  -N.p.o. except for sips with meds.  Okay for patient to have pure water  -Replete electrolytes  -We will start IV antibiotics with ceftriaxone for acute cystitis  -Follow-up blood cultures  -Hold home antihypertensive medications for now  -DVT prophylaxis with subcu Lovenox  -CODE STATUS is full code    33 minutes of critical care time was spent evaluating patient, reviewing documentation and planning treatment.    I discussed the patient's findings and my recommendations with Patient.    I have utilized all available immediate resources to obtain, update, or review the patient's current medications.      I confirmed that the patient's Advance Care Plan is present, code status is documented, or surrogate decision maker is listed in the patient's medical record.      Raj Quinones MD  10/04/21  22:45 CDT        Part of this note may be an electronic transcription/translation of spoken language to printed text using the Dragon Dictation System.

## 2021-10-05 ENCOUNTER — APPOINTMENT (OUTPATIENT)
Dept: ULTRASOUND IMAGING | Facility: HOSPITAL | Age: 31
End: 2021-10-05

## 2021-10-05 ENCOUNTER — APPOINTMENT (OUTPATIENT)
Dept: CT IMAGING | Facility: HOSPITAL | Age: 31
End: 2021-10-05

## 2021-10-05 ENCOUNTER — APPOINTMENT (OUTPATIENT)
Dept: INTERVENTIONAL RADIOLOGY/VASCULAR | Facility: HOSPITAL | Age: 31
End: 2021-10-05

## 2021-10-05 LAB
AMPHET+METHAMPHET UR QL: NEGATIVE
AMPHETAMINES UR QL: NEGATIVE
ANION GAP SERPL CALCULATED.3IONS-SCNC: 21 MMOL/L (ref 5–15)
ANION GAP SERPL CALCULATED.3IONS-SCNC: 22 MMOL/L (ref 5–15)
ANION GAP SERPL CALCULATED.3IONS-SCNC: 24 MMOL/L (ref 5–15)
ANION GAP SERPL CALCULATED.3IONS-SCNC: 24 MMOL/L (ref 5–15)
ANION GAP SERPL CALCULATED.3IONS-SCNC: 26 MMOL/L (ref 5–15)
ANION GAP SERPL CALCULATED.3IONS-SCNC: 26 MMOL/L (ref 5–15)
B PARAPERT DNA SPEC QL NAA+PROBE: NOT DETECTED
B PERT DNA SPEC QL NAA+PROBE: NOT DETECTED
BARBITURATES UR QL SCN: NEGATIVE
BASOPHILS # BLD AUTO: 0.08 10*3/MM3 (ref 0–0.2)
BASOPHILS NFR BLD AUTO: 0.4 % (ref 0–1.5)
BENZODIAZ UR QL SCN: POSITIVE
BUN SERPL-MCNC: 11 MG/DL (ref 6–20)
BUN SERPL-MCNC: 11 MG/DL (ref 6–20)
BUN SERPL-MCNC: 12 MG/DL (ref 6–20)
BUN SERPL-MCNC: 12 MG/DL (ref 6–20)
BUN SERPL-MCNC: 13 MG/DL (ref 6–20)
BUN SERPL-MCNC: 16 MG/DL (ref 6–20)
BUN/CREAT SERPL: 12.1 (ref 7–25)
BUN/CREAT SERPL: 12.4 (ref 7–25)
BUN/CREAT SERPL: 12.6 (ref 7–25)
BUN/CREAT SERPL: 12.9 (ref 7–25)
BUN/CREAT SERPL: 13.3 (ref 7–25)
BUN/CREAT SERPL: 17 (ref 7–25)
BUPRENORPHINE SERPL-MCNC: NEGATIVE NG/ML
C PNEUM DNA NPH QL NAA+NON-PROBE: NOT DETECTED
CALCIUM SPEC-SCNC: 10 MG/DL (ref 8.6–10.5)
CALCIUM SPEC-SCNC: 9.2 MG/DL (ref 8.6–10.5)
CALCIUM SPEC-SCNC: 9.3 MG/DL (ref 8.6–10.5)
CALCIUM SPEC-SCNC: 9.5 MG/DL (ref 8.6–10.5)
CALCIUM SPEC-SCNC: 9.5 MG/DL (ref 8.6–10.5)
CALCIUM SPEC-SCNC: 9.7 MG/DL (ref 8.6–10.5)
CANNABINOIDS SERPL QL: POSITIVE
CHLORIDE SERPL-SCNC: 114 MMOL/L (ref 98–107)
CHLORIDE SERPL-SCNC: 116 MMOL/L (ref 98–107)
CHLORIDE SERPL-SCNC: 120 MMOL/L (ref 98–107)
CHLORIDE SERPL-SCNC: 125 MMOL/L (ref 98–107)
CHLORIDE SERPL-SCNC: 126 MMOL/L (ref 98–107)
CHLORIDE SERPL-SCNC: 126 MMOL/L (ref 98–107)
CO2 SERPL-SCNC: 3 MMOL/L (ref 22–29)
CO2 SERPL-SCNC: 5 MMOL/L (ref 22–29)
CO2 SERPL-SCNC: 5 MMOL/L (ref 22–29)
CO2 SERPL-SCNC: 6 MMOL/L (ref 22–29)
COCAINE UR QL: NEGATIVE
CREAT SERPL-MCNC: 0.87 MG/DL (ref 0.57–1)
CREAT SERPL-MCNC: 0.91 MG/DL (ref 0.57–1)
CREAT SERPL-MCNC: 0.93 MG/DL (ref 0.57–1)
CREAT SERPL-MCNC: 0.94 MG/DL (ref 0.57–1)
CREAT SERPL-MCNC: 0.97 MG/DL (ref 0.57–1)
CREAT SERPL-MCNC: 0.98 MG/DL (ref 0.57–1)
DEPRECATED RDW RBC AUTO: 42.9 FL (ref 37–54)
EOSINOPHIL # BLD AUTO: 0 10*3/MM3 (ref 0–0.4)
EOSINOPHIL NFR BLD AUTO: 0 % (ref 0.3–6.2)
ERYTHROCYTE [DISTWIDTH] IN BLOOD BY AUTOMATED COUNT: 13.6 % (ref 12.3–15.4)
FLUAV SUBTYP SPEC NAA+PROBE: NOT DETECTED
FLUBV RNA ISLT QL NAA+PROBE: NOT DETECTED
GFR SERPL CREATININE-BSD FRML MDRD: 68 ML/MIN/1.73
GFR SERPL CREATININE-BSD FRML MDRD: 68 ML/MIN/1.73
GFR SERPL CREATININE-BSD FRML MDRD: 71 ML/MIN/1.73
GFR SERPL CREATININE-BSD FRML MDRD: 72 ML/MIN/1.73
GFR SERPL CREATININE-BSD FRML MDRD: 74 ML/MIN/1.73
GFR SERPL CREATININE-BSD FRML MDRD: 78 ML/MIN/1.73
GLUCOSE BLDC GLUCOMTR-MCNC: 134 MG/DL (ref 70–130)
GLUCOSE BLDC GLUCOMTR-MCNC: 138 MG/DL (ref 70–130)
GLUCOSE BLDC GLUCOMTR-MCNC: 153 MG/DL (ref 70–130)
GLUCOSE BLDC GLUCOMTR-MCNC: 155 MG/DL (ref 70–130)
GLUCOSE BLDC GLUCOMTR-MCNC: 157 MG/DL (ref 70–130)
GLUCOSE BLDC GLUCOMTR-MCNC: 162 MG/DL (ref 70–130)
GLUCOSE BLDC GLUCOMTR-MCNC: 163 MG/DL (ref 70–130)
GLUCOSE BLDC GLUCOMTR-MCNC: 165 MG/DL (ref 70–130)
GLUCOSE BLDC GLUCOMTR-MCNC: 175 MG/DL (ref 70–130)
GLUCOSE BLDC GLUCOMTR-MCNC: 179 MG/DL (ref 70–130)
GLUCOSE BLDC GLUCOMTR-MCNC: 210 MG/DL (ref 70–130)
GLUCOSE BLDC GLUCOMTR-MCNC: 222 MG/DL (ref 70–130)
GLUCOSE BLDC GLUCOMTR-MCNC: 240 MG/DL (ref 70–130)
GLUCOSE BLDC GLUCOMTR-MCNC: 243 MG/DL (ref 70–130)
GLUCOSE BLDC GLUCOMTR-MCNC: 243 MG/DL (ref 70–130)
GLUCOSE BLDC GLUCOMTR-MCNC: 244 MG/DL (ref 70–130)
GLUCOSE BLDC GLUCOMTR-MCNC: 248 MG/DL (ref 70–130)
GLUCOSE BLDC GLUCOMTR-MCNC: 256 MG/DL (ref 70–130)
GLUCOSE BLDC GLUCOMTR-MCNC: 278 MG/DL (ref 70–130)
GLUCOSE SERPL-MCNC: 118 MG/DL (ref 65–99)
GLUCOSE SERPL-MCNC: 154 MG/DL (ref 65–99)
GLUCOSE SERPL-MCNC: 155 MG/DL (ref 65–99)
GLUCOSE SERPL-MCNC: 166 MG/DL (ref 65–99)
GLUCOSE SERPL-MCNC: 217 MG/DL (ref 65–99)
GLUCOSE SERPL-MCNC: 253 MG/DL (ref 65–99)
HADV DNA SPEC NAA+PROBE: NOT DETECTED
HCOV 229E RNA SPEC QL NAA+PROBE: NOT DETECTED
HCOV HKU1 RNA SPEC QL NAA+PROBE: NOT DETECTED
HCOV NL63 RNA SPEC QL NAA+PROBE: NOT DETECTED
HCOV OC43 RNA SPEC QL NAA+PROBE: NOT DETECTED
HCT VFR BLD AUTO: 48.6 % (ref 34–46.6)
HGB BLD-MCNC: 16.5 G/DL (ref 12–15.9)
HMPV RNA NPH QL NAA+NON-PROBE: NOT DETECTED
HPIV1 RNA SPEC QL NAA+PROBE: NOT DETECTED
HPIV2 RNA SPEC QL NAA+PROBE: NOT DETECTED
HPIV3 RNA NPH QL NAA+PROBE: NOT DETECTED
HPIV4 P GENE NPH QL NAA+PROBE: NOT DETECTED
IMM GRANULOCYTES # BLD AUTO: 0.71 10*3/MM3 (ref 0–0.05)
IMM GRANULOCYTES NFR BLD AUTO: 3.2 % (ref 0–0.5)
LYMPHOCYTES # BLD AUTO: 1.58 10*3/MM3 (ref 0.7–3.1)
LYMPHOCYTES NFR BLD AUTO: 7.1 % (ref 19.6–45.3)
M PNEUMO IGG SER IA-ACNC: NOT DETECTED
MAGNESIUM SERPL-MCNC: 2.2 MG/DL (ref 1.6–2.6)
MAGNESIUM SERPL-MCNC: 2.4 MG/DL (ref 1.6–2.6)
MCH RBC QN AUTO: 30.3 PG (ref 26.6–33)
MCHC RBC AUTO-ENTMCNC: 34 G/DL (ref 31.5–35.7)
MCV RBC AUTO: 89.2 FL (ref 79–97)
METHADONE UR QL SCN: NEGATIVE
MONOCYTES # BLD AUTO: 1.99 10*3/MM3 (ref 0.1–0.9)
MONOCYTES NFR BLD AUTO: 9 % (ref 5–12)
NEUTROPHILS NFR BLD AUTO: 17.77 10*3/MM3 (ref 1.7–7)
NEUTROPHILS NFR BLD AUTO: 80.3 % (ref 42.7–76)
NRBC BLD AUTO-RTO: 0.1 /100 WBC (ref 0–0.2)
OPIATES UR QL: NEGATIVE
OXYCODONE UR QL SCN: NEGATIVE
PCP UR QL SCN: NEGATIVE
PHOSPHATE SERPL-MCNC: 0.7 MG/DL (ref 2.5–4.5)
PHOSPHATE SERPL-MCNC: 0.8 MG/DL (ref 2.5–4.5)
PHOSPHATE SERPL-MCNC: 0.9 MG/DL (ref 2.5–4.5)
PHOSPHATE SERPL-MCNC: 1.3 MG/DL (ref 2.5–4.5)
PHOSPHATE SERPL-MCNC: 1.4 MG/DL (ref 2.5–4.5)
PHOSPHATE SERPL-MCNC: 1.5 MG/DL (ref 2.5–4.5)
PLATELET # BLD AUTO: 219 10*3/MM3 (ref 140–450)
PMV BLD AUTO: 11.8 FL (ref 6–12)
POTASSIUM SERPL-SCNC: 3.6 MMOL/L (ref 3.5–5.2)
POTASSIUM SERPL-SCNC: 3.9 MMOL/L (ref 3.5–5.2)
POTASSIUM SERPL-SCNC: 4 MMOL/L (ref 3.5–5.2)
POTASSIUM SERPL-SCNC: 4.1 MMOL/L (ref 3.5–5.2)
POTASSIUM SERPL-SCNC: 4.5 MMOL/L (ref 3.5–5.2)
POTASSIUM SERPL-SCNC: 4.6 MMOL/L (ref 3.5–5.2)
PROPOXYPH UR QL: NEGATIVE
RBC # BLD AUTO: 5.45 10*6/MM3 (ref 3.77–5.28)
RHINOVIRUS RNA SPEC NAA+PROBE: NOT DETECTED
RSV RNA NPH QL NAA+NON-PROBE: NOT DETECTED
SARS-COV-2 RNA NPH QL NAA+NON-PROBE: NOT DETECTED
SODIUM SERPL-SCNC: 145 MMOL/L (ref 136–145)
SODIUM SERPL-SCNC: 146 MMOL/L (ref 136–145)
SODIUM SERPL-SCNC: 149 MMOL/L (ref 136–145)
SODIUM SERPL-SCNC: 152 MMOL/L (ref 136–145)
SODIUM SERPL-SCNC: 154 MMOL/L (ref 136–145)
SODIUM SERPL-SCNC: 155 MMOL/L (ref 136–145)
TRICYCLICS UR QL SCN: NEGATIVE
WBC # BLD AUTO: 22.13 10*3/MM3 (ref 3.4–10.8)

## 2021-10-05 PROCEDURE — 82962 GLUCOSE BLOOD TEST: CPT

## 2021-10-05 PROCEDURE — 76942 ECHO GUIDE FOR BIOPSY: CPT

## 2021-10-05 PROCEDURE — 36410 VNPNXR 3YR/> PHY/QHP DX/THER: CPT

## 2021-10-05 PROCEDURE — 80048 BASIC METABOLIC PNL TOTAL CA: CPT | Performed by: INTERNAL MEDICINE

## 2021-10-05 PROCEDURE — 80306 DRUG TEST PRSMV INSTRMNT: CPT | Performed by: HOSPITALIST

## 2021-10-05 PROCEDURE — 63710000001 INSULIN DETEMIR PER 5 UNITS: Performed by: INTERNAL MEDICINE

## 2021-10-05 PROCEDURE — 25010000002 ENOXAPARIN PER 10 MG: Performed by: INTERNAL MEDICINE

## 2021-10-05 PROCEDURE — 84100 ASSAY OF PHOSPHORUS: CPT | Performed by: INTERNAL MEDICINE

## 2021-10-05 PROCEDURE — 74176 CT ABD & PELVIS W/O CONTRAST: CPT

## 2021-10-05 PROCEDURE — 82010 KETONE BODYS QUAN: CPT | Performed by: INTERNAL MEDICINE

## 2021-10-05 PROCEDURE — 76937 US GUIDE VASCULAR ACCESS: CPT

## 2021-10-05 PROCEDURE — 25010000002 CEFTRIAXONE PER 250 MG: Performed by: INTERNAL MEDICINE

## 2021-10-05 PROCEDURE — C1751 CATH, INF, PER/CENT/MIDLINE: HCPCS

## 2021-10-05 PROCEDURE — 83735 ASSAY OF MAGNESIUM: CPT | Performed by: INTERNAL MEDICINE

## 2021-10-05 PROCEDURE — 25010000002 LORAZEPAM PER 2 MG: Performed by: INTERNAL MEDICINE

## 2021-10-05 PROCEDURE — 93005 ELECTROCARDIOGRAM TRACING: CPT | Performed by: INTERNAL MEDICINE

## 2021-10-05 PROCEDURE — 05HY33Z INSERTION OF INFUSION DEVICE INTO UPPER VEIN, PERCUTANEOUS APPROACH: ICD-10-PCS | Performed by: INTERNAL MEDICINE

## 2021-10-05 PROCEDURE — 85025 COMPLETE CBC W/AUTO DIFF WBC: CPT | Performed by: INTERNAL MEDICINE

## 2021-10-05 PROCEDURE — 25010000003 INSULIN REGULAR HUMAN PER 5 UNITS: Performed by: INTERNAL MEDICINE

## 2021-10-05 PROCEDURE — 93010 ELECTROCARDIOGRAM REPORT: CPT | Performed by: INTERNAL MEDICINE

## 2021-10-05 RX ORDER — LISINOPRIL 20 MG/1
20 TABLET ORAL
Status: DISCONTINUED | OUTPATIENT
Start: 2021-10-05 | End: 2021-10-11 | Stop reason: HOSPADM

## 2021-10-05 RX ORDER — CLONIDINE HYDROCHLORIDE 0.1 MG/1
0.1 TABLET ORAL DAILY
Status: DISCONTINUED | OUTPATIENT
Start: 2021-10-05 | End: 2021-10-11 | Stop reason: HOSPADM

## 2021-10-05 RX ORDER — DEXTROSE, SODIUM CHLORIDE, AND POTASSIUM CHLORIDE 5; .45; .15 G/100ML; G/100ML; G/100ML
200 INJECTION INTRAVENOUS CONTINUOUS PRN
Status: DISCONTINUED | OUTPATIENT
Start: 2021-10-05 | End: 2021-10-08

## 2021-10-05 RX ORDER — DEXTROSE, SODIUM CHLORIDE, AND POTASSIUM CHLORIDE 5; .45; .15 G/100ML; G/100ML; G/100ML
200 INJECTION INTRAVENOUS CONTINUOUS PRN
Status: DISCONTINUED | OUTPATIENT
Start: 2021-10-05 | End: 2021-10-05

## 2021-10-05 RX ADMIN — SODIUM CHLORIDE, POTASSIUM CHLORIDE, SODIUM LACTATE AND CALCIUM CHLORIDE 2000 ML: 600; 310; 30; 20 INJECTION, SOLUTION INTRAVENOUS at 15:20

## 2021-10-05 RX ADMIN — POTASSIUM CHLORIDE, DEXTROSE MONOHYDRATE AND SODIUM CHLORIDE 200 ML/HR: 150; 5; 450 INJECTION, SOLUTION INTRAVENOUS at 11:07

## 2021-10-05 RX ADMIN — SODIUM CHLORIDE 7 UNITS/HR: 9 INJECTION, SOLUTION INTRAVENOUS at 10:06

## 2021-10-05 RX ADMIN — INSULIN DETEMIR 10 UNITS: 100 INJECTION, SOLUTION SUBCUTANEOUS at 20:34

## 2021-10-05 RX ADMIN — CEFTRIAXONE SODIUM 2 G: 2 INJECTION, POWDER, FOR SOLUTION INTRAMUSCULAR; INTRAVENOUS at 17:10

## 2021-10-05 RX ADMIN — POTASSIUM CHLORIDE, DEXTROSE MONOHYDRATE AND SODIUM CHLORIDE 200 ML/HR: 150; 5; 450 INJECTION, SOLUTION INTRAVENOUS at 05:27

## 2021-10-05 RX ADMIN — LORAZEPAM 1 MG: 2 INJECTION INTRAMUSCULAR; INTRAVENOUS at 08:39

## 2021-10-05 RX ADMIN — SODIUM BICARBONATE 50 MEQ: 84 INJECTION INTRAVENOUS at 09:20

## 2021-10-05 RX ADMIN — POTASSIUM PHOSPHATE, MONOBASIC AND POTASSIUM PHOSPHATE, DIBASIC 45 MMOL: 224; 236 INJECTION, SOLUTION, CONCENTRATE INTRAVENOUS at 13:45

## 2021-10-05 RX ADMIN — POTASSIUM PHOSPHATE, MONOBASIC AND POTASSIUM PHOSPHATE, DIBASIC 15 MMOL: 224; 236 INJECTION, SOLUTION, CONCENTRATE INTRAVENOUS at 03:15

## 2021-10-05 RX ADMIN — INSULIN DETEMIR 10 UNITS: 100 INJECTION, SOLUTION SUBCUTANEOUS at 09:08

## 2021-10-05 RX ADMIN — POTASSIUM CHLORIDE, DEXTROSE MONOHYDRATE AND SODIUM CHLORIDE 200 ML/HR: 150; 5; 450 INJECTION, SOLUTION INTRAVENOUS at 17:25

## 2021-10-05 RX ADMIN — POTASSIUM CHLORIDE, DEXTROSE MONOHYDRATE AND SODIUM CHLORIDE 200 ML/HR: 150; 5; 450 INJECTION, SOLUTION INTRAVENOUS at 22:53

## 2021-10-05 RX ADMIN — ENOXAPARIN SODIUM 40 MG: 40 INJECTION SUBCUTANEOUS at 17:10

## 2021-10-05 RX ADMIN — CLONIDINE HYDROCHLORIDE 0.1 MG: 0.1 TABLET ORAL at 21:57

## 2021-10-05 RX ADMIN — LISINOPRIL 20 MG: 20 TABLET ORAL at 21:57

## 2021-10-05 RX ADMIN — LORAZEPAM 1 MG: 2 INJECTION INTRAMUSCULAR; INTRAVENOUS at 20:34

## 2021-10-05 RX ADMIN — LORAZEPAM 1 MG: 2 INJECTION INTRAMUSCULAR; INTRAVENOUS at 15:13

## 2021-10-05 RX ADMIN — SODIUM BICARBONATE 50 MEQ: 84 INJECTION INTRAVENOUS at 17:56

## 2021-10-05 NOTE — PLAN OF CARE
Goal Outcome Evaluation:   Pt very restless during shift. Bilat wrist restraints and posey still in place. Agarwal placed today, output adequate. Insulin currently infusing @ 3. Phosphorus replacement infusing. IVF @ 200. VSS at this time. Will continue to monitor.

## 2021-10-05 NOTE — CONSULTS
Adult Nutrition  Assessment    Patient Name:  Emili Caal  YOB: 1990  MRN: [id number]  Admit Date:  10/4/2021    Assessment Date:  10/5/2021    Comments:  Pt admit d/t DKA and UTI. A1c is 13. Most recent Glucose reading 253. Insulin drip continues. Remains NPO. Pt is very confused and required posey vest and sitter. RD unable to obtain any nutrition hx info. Will monitor course and make recs/provide ADA diet ed as appropriate.      Reason for Assessment     Row Name 10/05/21 0917          Reason for Assessment    Reason For Assessment  physician consult     Diagnosis  diabetes diagnosis/complications;infection/sepsis     Identified At Risk by Screening Criteria  need for education         Nutrition/Diet History     Row Name 10/05/21 0917          Nutrition/Diet History    Typical Food/Fluid Intake  Pt is confused. Not able to participate w/RD.         Anthropometrics     Row Name 10/05/21 0400          Anthropometrics    Weight  102 kg (225 lb 8.5 oz)         Labs/Tests/Procedures/Meds     Row Name 10/05/21 0918          Labs/Procedures/Meds    Lab Results Reviewed  reviewed, pertinent     Lab Results Comments  Na 149, Gl 253, A1c 13.1, phos 1.4        Medications    Pertinent Medications Reviewed  reviewed     Pertinent Medications Comments  insulin drip         Physical Findings     Row Name 10/05/21 0919          Physical Findings    Overall Physical Appearance  overweight         Estimated/Assessed Needs     Row Name 10/05/21 0919          Calculation Measurements    Weight Used For Calculations  59 kg (130 lb)        Estimated/Assessed Needs    Additional Documentation  Protein Requirements (Group);KCAL/KG (Group);Fluid Requirements (Group)        KCAL/KG    KCAL/KG  25 Kcal/Kg (kcal)     25 Kcal/Kg (kcal)  1474.2        Protein Requirements    Weight Used For Protein Calculations  59 kg (130 lb)     Est Protein Requirement Amount (gms/kg)  0.8 gm protein     Estimated Protein Requirements  (gms/day)  47.17        Fluid Requirements    Fluid Requirements (mL/day)  1500     RDA Method (mL)  1500         Nutrition Prescription Ordered     Row Name 10/05/21 0919          Nutrition Prescription PO    Current PO Diet  NPO                 Electronically signed by:  Marcelina Fish RD  10/05/21 09:21 CDT

## 2021-10-05 NOTE — PROGRESS NOTES
[facility] Medicine Services  INPATIENT PROGRESS NOTE    Length of Stay: 1  Date of Admission: 10/4/2021  Primary Care Physician: Bj Duckworth MD    Subjective   Chief Complaint: Dysuria, abdominal pain, confusion    HPI: Patient continues to have confusion and lethargy. She complains of some vague abdominal pain.  Glucose has come down but anion gap remains open. Patient has metabolic acidosis.    Review of Systems   Unable to perform ROS: Mental status change     Objective    Temp:  [93.3 °F (34.1 °C)-98.1 °F (36.7 °C)] 98.1 °F (36.7 °C)  Heart Rate:  [106-134] 134  Resp:  [20-22] 22  BP: (125-163)/(58-86) 125/58    Physical Exam  Constitutional:       General: She is not in acute distress.     Appearance: She is ill-appearing. She is not diaphoretic.      Comments: Drowsy, lethargic.   HENT:      Head: Normocephalic and atraumatic.      Right Ear: External ear normal.      Left Ear: External ear normal.      Nose: No congestion or rhinorrhea.      Mouth/Throat:      Mouth: Mucous membranes are dry.      Pharynx: No oropharyngeal exudate or posterior oropharyngeal erythema.   Eyes:      General: No scleral icterus.     Extraocular Movements: Extraocular movements intact.      Conjunctiva/sclera: Conjunctivae normal.   Cardiovascular:      Rate and Rhythm: Regular rhythm. Tachycardia present.      Heart sounds: Normal heart sounds. No murmur heard.     Pulmonary:      Effort: Pulmonary effort is normal. No respiratory distress.      Breath sounds: Normal breath sounds. No wheezing, rhonchi or rales.   Abdominal:      General: Abdomen is flat. There is no distension.      Palpations: Abdomen is soft.      Tenderness: There is abdominal tenderness. There is no guarding.      Comments: Vague abdominal tenderness noted.   Musculoskeletal:         General: No swelling, tenderness or deformity.      Cervical back: Neck supple. No rigidity. No muscular tenderness.      Right  lower leg: No edema.      Left lower leg: No edema.   Lymphadenopathy:      Cervical: No cervical adenopathy.   Skin:     General: Skin is warm and dry.   Neurological:      General: No focal deficit present.      Cranial Nerves: No cranial nerve deficit.      Motor: No weakness.      Comments: Confused.   Psychiatric:         Mood and Affect: Mood normal.         Behavior: Behavior normal.         Thought Content: Thought content normal.       Medication Review:    Current Facility-Administered Medications:   •  acetaminophen (TYLENOL) tablet 650 mg, 650 mg, Oral, Q4H PRN, Raj Quinones MD  •  cefTRIAXone (ROCEPHIN) 2 g/100 mL 0.9% NS IVPB (MBP), 2 g, Intravenous, Q24H, Raj Quinones MD, 2 g at 10/04/21 1804  •  dextrose (D50W) 25 g/ 50mL Intravenous Solution 12.5 g, 12.5 g, Intravenous, PRN, Raj Quinones MD  •  dextrose 5 % and sodium chloride 0.45 % infusion, 150 mL/hr, Intravenous, Continuous PRN, Raj Quinones MD  •  dextrose 5 % and sodium chloride 0.45 % with KCl 20 mEq/L infusion, 150 mL/hr, Intravenous, Continuous PRN, Raj Quinones MD, Stopped at 10/05/21 0515  •  dextrose 5 % and sodium chloride 0.45 % with KCl 20 mEq/L infusion, 200 mL/hr, Intravenous, Continuous PRN, Michael Bell Jr., MD, Last Rate: 200 mL/hr at 10/05/21 1107, 200 mL/hr at 10/05/21 1107  •  dextrose 5 % and sodium chloride 0.45 % with KCl 40 mEq/L infusion, 150 mL/hr, Intravenous, Continuous PRN, Raj Quinones MD  •  dextrose 5 % and sodium chloride 0.9 % infusion, 150 mL/hr, Intravenous, Continuous PRNStacie Sotonte E, MD  •  dextrose 5 % and sodium chloride 0.9 % with KCl 20 mEq/L infusion, 150 mL/hr, Intravenous, Continuous Stacie DONALD Sotonte E, MD  •  dextrose 5 % and sodium chloride 0.9 % with KCl 40 mEq/L infusion, 150 mL/hr, Intravenous, Continuous PRN, Raj Quinones MD  •  enoxaparin (LOVENOX) syringe 40 mg, 40 mg, Subcutaneous, Q24H, Raj Quinones MD, 40 mg at  10/04/21 2118  •  insulin detemir (LEVEMIR) injection 10 Units, 10 Units, Subcutaneous, Q12H, Raj Quinones MD  •  insulin regular (HumuLIN R,NovoLIN R) 100 Units in sodium chloride 0.9 % 100 mL (1 Units/mL) infusion, 10 Units/hr, Intravenous, Titrated, Raj Quinones MD, Last Rate: 4 mL/hr at 10/05/21 1404, 4 Units/hr at 10/05/21 1404  •  lactated ringers bolus 2,000 mL, 2,000 mL, Intravenous, Once, Raj Quinones MD  •  LORazepam (ATIVAN) injection 1 mg, 1 mg, Intravenous, Q4H PRN, Michael Bell Jr., MD, 1 mg at 10/05/21 0839  •  morphine injection 2 mg, 2 mg, Intravenous, Q2H PRN **AND** naloxone (NARCAN) injection 0.4 mg, 0.4 mg, Intravenous, Q5 Min PRN, Raj Quinoens MD  •  ondansetron (ZOFRAN) injection 4 mg, 4 mg, Intravenous, Q6H PRN, Raj Quinones MD  •  potassium phosphate 45 mmol in sodium chloride 0.9 % 500 mL infusion, 45 mmol, Intravenous, PRN, Last Rate: 62.5 mL/hr at 10/05/21 1345, 45 mmol at 10/05/21 1345 **OR** potassium phosphate 30 mmol in sodium chloride 0.9 % 250 mL infusion, 30 mmol, Intravenous, PRN **OR** Potassium Phosphates 15 mmol in sodium chloride 0.9 % 100 mL infusion, 15 mmol, Intravenous, PRN **OR** sodium phosphates 45 mmol in sodium chloride 0.9 % 500 mL IVPB, 45 mmol, Intravenous, PRN **OR** sodium phosphates 30 mmol in sodium chloride 0.9 % 250 mL IVPB, 30 mmol, Intravenous, PRN **OR** sodium phosphates 15 mmol in sodium chloride 0.9 % 250 mL IVPB, 15 mmol, Intravenous, PRN, Raj Quinones MD  •  sodium chloride 0.45 % 1,000 mL with potassium chloride 40 mEq infusion, 250 mL/hr, Intravenous, Continuous PRNStacie Sotonte E, MD  •  sodium chloride 0.45 % infusion, 250 mL/hr, Intravenous, Continuous PRNStacie Sotonte E, MD  •  sodium chloride 0.45 % with KCl 20 mEq/L infusion, 250 mL/hr, Intravenous, Continuous PRNStacie Sotonte E, MD, Stopped at 10/04/21 9595  •  sodium chloride 0.9 % flush 10 mL, 10 mL, Intravenous, PRNStacie,  Raj SNOWDEN MD  •  sodium chloride 0.9 % flush 10 mL, 10 mL, Intravenous, Once PRN, Raj Quinones MD  •  sodium chloride 0.9 % flush 10 mL, 10 mL, Intravenous, Q12H, Raj Quinones MD  •  sodium chloride 0.9 % flush 10 mL, 10 mL, Intravenous, PRN, Raj Quinones MD  •  sodium chloride 0.9 % infusion, 250 mL/hr, Intravenous, Continuous PRN, Raj Quinones MD  •  sodium chloride 0.9 % infusion, 10 mL/hr, Intravenous, Continuous PRN, Raj Quinones MD  •  sodium chloride 0.9 % with KCl 20 mEq/L infusion, 250 mL/hr, Intravenous, Continuous PRNStacie Sotonte E, MD  •  sodium chloride 0.9 % with KCl 40 mEq/L infusion, 250 mL/hr, Intravenous, Continuous PRN, Raj Quinones MD    I have reviewed the patient's current medications.     Results Review:  I have reviewed the labs, radiology results, and diagnostic studies.    Laboratory Data:   Results from last 7 days   Lab Units 10/05/21  1422 10/05/21  1157 10/05/21  0824 10/04/21  2359 10/04/21  2007   SODIUM mmol/L 154* 155* 149*   < > 142   POTASSIUM mmol/L 4.0 3.6 3.9   < > 4.8   CHLORIDE mmol/L 126* 126* 120*   < > 113*   CO2 mmol/L 6.0* 5.0* 3.0*   < > 2.0*   BUN mg/dL 11 12 12   < > 17   CREATININE mg/dL 0.91 0.93 0.97   < > 1.02*   GLUCOSE mg/dL 155* 166* 253*   < > 214*   CALCIUM mg/dL 9.5 10.0 9.7   < > 9.3   BILIRUBIN mg/dL  --   --   --   --  0.2   ALK PHOS U/L  --   --   --   --  155*   ALT (SGPT) U/L  --   --   --   --  20   AST (SGOT) U/L  --   --   --   --  22   ANION GAP mmol/L 22.0* 24.0* 26.0*   < > 27.0*    < > = values in this interval not displayed.     Estimated Creatinine Clearance: 111 mL/min (by C-G formula based on SCr of 0.91 mg/dL).  Results from last 7 days   Lab Units 10/05/21  1422 10/05/21  1157 10/05/21  0824   MAGNESIUM mg/dL 2.4 2.4 2.4   PHOSPHORUS mg/dL 0.9* 0.7* 1.4*         Results from last 7 days   Lab Units 10/05/21  0405 10/04/21  1800   WBC 10*3/mm3 22.13* 30.01*   HEMOGLOBIN g/dL 16.5* 17.1*    HEMATOCRIT % 48.6* 51.0*   PLATELETS 10*3/mm3 219 265           Culture Data:   No results found for: BLOODCX  No results found for: URINECX  No results found for: RESPCX  No results found for: WOUNDCX  No results found for: STOOLCX  No components found for: BODYFLD    Radiology Data:   Imaging Results (Last 24 Hours)     Procedure Component Value Units Date/Time    CT Abdomen Pelvis Stone Protocol [[id number]] Resulted: 10/05/21 1446     Updated: 10/05/21 1446    US Guided Vascular Access [[id number]] Collected: 10/05/21 1422     Updated: 10/05/21 1443    Narrative:      EXAM: US GUIDANCE FOR VASCULAR ACCESS    INDICATION: Intravenous access    FINDINGS:  Real-time ultrasound imaging was utilized to visualize needle  entry into the patient right basilic vein during midline catheter  placement. 2 images were stored for recording and reporting.      Impression:      Imaging obtained during vascular access device placement under  ultrasound guidance.    Electronically signed by:  Chanel Grajeda MD  10/5/2021  2:42 PM CDT Workstation: [id number]    IR Insert Midline Without Port Pump 5 Plus [[id number]] Resulted: 10/05/21 1427     Updated: 10/05/21 1427    Narrative:      This procedure was auto-finalized with no dictation required.          Assessment/Plan     Hospital Problem List:  Principal Problem:    DKA (diabetic ketoacidosis) (HCC)  Active Problems:    Acute UTI (urinary tract infection)    Sepsis, unspecified organism (HCC)  Metabolic acidosis     Plan  -Patient has DKA which is likely due to combination of urinary tract infection, sepsis and SGLT2 inhibitor  -She seems dehydrated at this time  -We will give IV Ringer's lactate 2 L bolus  -Continue on IV fluids and IV insulin as per DKA protocol  -BMP every 4 hours and monitor for closure of anion gap  -N.p.o. except for sips with meds. Okay for patient to have pure water and ice chips  -Replete electrolytes  -Continue IV antibiotics with ceftriaxone  for acute cystitis  -CT of the abdomen and pelvis to evaluate for pyelonephritis and renal tract stone  -Follow-up blood cultures  -Hold home antihypertensive medications for now  -DVT prophylaxis with subcu Lovenox  -CODE STATUS is full code     35 minutes of critical care time was spent evaluating patient, reviewing documentation and planning treatment.    Discharge Planning: In progress    I confirmed that the patient's Advance Care Plan is present, code status is documented, or surrogate decision maker is listed in the patient's medical record.      I have utilized all available immediate resources to obtain, update, or review the patient's current medications.      Raj Quinones MD   10/05/21   14:52 CDT

## 2021-10-05 NOTE — PROGRESS NOTES
TWO PATIENT IDENTIFIERS WERE USED. THE PATIENT WAS DRAPED WITH A FULL BODY DRAPE AND THE PATIENT'S RIGHT ARM WAS PREPPED WITH CHLORA PREP. ULTRASOUND WAS USED TO LOCALIZE THERIGHT BASILIC VEIN. SUBCUTANEOUS TISSUE AT THE CATHETER SITE WAS INFILTRATED WITH 2% LIDOCAINE. UNDER ULTRASOUND GUIDANCE, THE VEIN WAS ACCESSED WITH A 21 GAUGE  NEEDLE. AN 0.018 WIRE WAS THEN THREADED THROUGH THE NEEDLE. THE 21 GAUGE NEEDLE WAS REMOVED AND A 4 Turkish SHEATH WAS PLACED OVER THE WIRE INTO THE VEIN.THE MIDLINE CATHETER WAS TRIMMED TO 20CM. THE MIDLINE CATHETER WAS THEN PLACED OVER THE WIRE INTO THE VEIN, THE SHEATH WAS PEELED AWAY, WIRE WAS REMOVED. CATHETER WAS FLUSHED WITH NORMAL SALINE AND CATHETER TIP APPLIED. BIOPATCH PLACED. CATHETER SECURED WITH STAT LOCK AND TEGADERM. PATIENT TOLERATED PROCEDURE WELL. THIS WAS DONE AT BEDSIDE      IMPRESSION:SUCCESSFUL PLACEMENT OF DUAL LUMEN MIDLINE.           Sandra Palafox  10/5/2021  14:27 CDT

## 2021-10-05 NOTE — PAYOR COMM NOTE
"Patti Parmar   [facility]  phone [phone]  fax [phone]    Dudley Bose (30 y.o. Female)     Date of Birth Social Security Number Address Home Phone MRN    [id number]  [address] [facility] [address] [phone] [id number]    Sabianist Marital Status          Adventism Single       Admission Date Admission Type Admitting Provider Attending Provider Department, Room/Bed    10/4/21 Urgent Raj Quinones MD Ebenibo, Sotonte E, MD [facility] CRITICAL CARE STEPDOWN, 18/A    Discharge Date Discharge Disposition Discharge Destination                       Attending Provider: Raj Quinones MD    Allergies: Hydroxyquinolines    Isolation: None   Infection: None   Code Status: CPR    Ht: 167.6 cm (66\")   Wt: 102 kg (225 lb 8.5 oz)    Admission Cmt: None   Principal Problem: DKA (diabetic ketoacidosis) (HCC) [E11.10]                 Active Insurance as of 10/4/2021     Primary Coverage     Payor Plan Insurance Group Employer/Plan Group    [employer] [facility] [employer] [facility]      Payor Plan Address Payor Plan Phone Number Payor Plan Fax Number Effective Dates    [address] [address]   11/1/2018 - None Entered    [place] [phone]       Subscriber Name Subscriber Birth Date Member ID       DUDLEY BOSE 1990 [id number]                 Emergency Contacts      (Rel.) Home Phone Work Phone Mobile Phone    JAZMYN BOSE (Sister) [phone] -- [phone]               History & Physical      Raj Quinones MD at 10/04/21 1647                [facility] Medicine Services  INPATIENT HISTORY AND PHYSICAL       Patient Care Team:  Bj Duckworth MD as PCP - General (Pulmonary Disease)      Date of Admission: 10/4/2021      Chief complaint Dysuria, abdominal pain and confusion    Subjective     Patient is a 30 y.o. female with a past medical history of type 2 diabetes " mellitus with noncompliance, essential hypertension, anxiety and PTSD.  She presents with complaints of dysuria, abdominal pain and vomiting of 2 days duration along with worsening confusion today.    Patient states she has been noncompliant with her diabetic medication of Steglatro recently.  Secondary stop taking it due to some abdominal pain and discomfort she was feeling.  Her abdominal pain, nausea and vomiting worsened over the last 2 days and she had some dysuria as well.  She denies fevers or chills.  However due to her worsening symptoms and confusion she presented to [facility] today.  She has some tachycardia.  If venous blood gas sample was consistent with acidosis with pH of 6.87 and bicarb of 4.  Urinalysis had ketones and a BMP was consistent with DKA with bicarb of 6.  Chest x-ray did not show any acute abnormalities and rapid Covid screen was negative.  She was sent to [facility] for further evaluation.    At the time of my review patient is alert and oriented x3 but lethargic.  She complains of some abdominal pain that is vague and generalized as well as dysuria.    Review of Systems   Constitutional: Negative for activity change, appetite change, chills, fatigue and fever.   HENT: Negative for congestion, ear pain, rhinorrhea, sore throat and trouble swallowing.    Respiratory: Negative for cough, chest tightness, shortness of breath and wheezing.    Cardiovascular: Negative for chest pain, palpitations and leg swelling.   Gastrointestinal: Positive for abdominal pain, nausea and vomiting. Negative for abdominal distention and diarrhea.   Genitourinary: Positive for dysuria. Negative for difficulty urinating and hematuria.   Musculoskeletal: Negative for arthralgias, back pain and myalgias.   Skin: Negative for pallor and rash.   Neurological: Positive for weakness. Negative for dizziness, syncope, light-headedness and headaches.   Hematological: Negative for  adenopathy. Does not bruise/bleed easily.   Psychiatric/Behavioral: Positive for decreased concentration. Negative for agitation and confusion. The patient is not nervous/anxious.      History  Past Medical History:   Diagnosis Date   • Allergic    • Anxiety    • Asthma    • Chronic diarrhea    • Depression    • Diabetes mellitus (HCC)    • High blood pressure    • Hyperlipidemia    • Liver disease    • PTSD (post-traumatic stress disorder)      History reviewed. No pertinent surgical history.  Family History   Problem Relation Age of Onset   • Liver disease Mother    • Liver disease Father      Social History     Tobacco Use   • Smoking status: Current Every Day Smoker     Packs/day: 0.50     Types: Cigarettes   • Smokeless tobacco: Never Used   Vaping Use   • Vaping Use: Former   Substance Use Topics   • Alcohol use: Never   • Drug use: Not Currently     Medications Prior to Admission   Medication Sig Dispense Refill Last Dose   • cloNIDine (CATAPRES) 0.1 MG tablet Take 0.1 mg by mouth Daily.      • cyclobenzaprine (FLEXERIL) 10 MG tablet Take 10 mg by mouth Daily.      • dicyclomine (BENTYL) 20 MG tablet Take 20 mg by mouth Every 6 (Six) Hours.      • Ertugliflozin L-PyroglutamicAc (Steglatro) 15 MG tablet Take 15 mg by mouth Every Morning.      • escitalopram (LEXAPRO) 10 MG tablet Take 10 mg by mouth Daily.      • lisinopril (PRINIVIL,ZESTRIL) 20 MG tablet Take 20 mg by mouth Daily.      • simvastatin (ZOCOR) 20 MG tablet Take 20 mg by mouth Every Night.        Allergies:  Hydroxyquinolines  Prior to Admission medications    Medication Sig Start Date End Date Taking? Authorizing Provider   cloNIDine (CATAPRES) 0.1 MG tablet Take 0.1 mg by mouth Daily.   Yes Provider, MD Nate   cyclobenzaprine (FLEXERIL) 10 MG tablet Take 10 mg by mouth Daily.   Yes Provider, MD Nate   dicyclomine (BENTYL) 20 MG tablet Take 20 mg by mouth Every 6 (Six) Hours.   Yes ProviderNate MD   Ertugliflozin  L-PyroglutamicAc (Steglatro) 15 MG tablet Take 15 mg by mouth Every Morning.   Yes ProviderNate MD   escitalopram (LEXAPRO) 10 MG tablet Take 10 mg by mouth Daily.   Yes ProviderNate MD   lisinopril (PRINIVIL,ZESTRIL) 20 MG tablet Take 20 mg by mouth Daily.   Yes ProviderNate MD   simvastatin (ZOCOR) 20 MG tablet Take 20 mg by mouth Every Night.   Yes ProviderNate MD       I have reviewed the patient's current medications    Objective        Vital Signs  Temp:  [93.3 °F (34.1 °C)-95.9 °F (35.5 °C)] 95.9 °F (35.5 °C)  Heart Rate:  [106-121] 119  Resp:  [22] 22  BP: (125-149)/(65-80) 145/67      Physical Exam  Constitutional:       General: She is not in acute distress.     Appearance: She is ill-appearing. She is not diaphoretic.   HENT:      Head: Normocephalic and atraumatic.      Right Ear: External ear normal.      Left Ear: External ear normal.      Nose: No congestion or rhinorrhea.      Mouth/Throat:      Mouth: Mucous membranes are dry.      Pharynx: No oropharyngeal exudate or posterior oropharyngeal erythema.   Eyes:      General: No scleral icterus.     Extraocular Movements: Extraocular movements intact.      Conjunctiva/sclera: Conjunctivae normal.   Cardiovascular:      Rate and Rhythm: Normal rate and regular rhythm.      Heart sounds: Normal heart sounds. No murmur heard.     Pulmonary:      Effort: Pulmonary effort is normal. No respiratory distress.      Breath sounds: Normal breath sounds. No wheezing, rhonchi or rales.   Abdominal:      General: Abdomen is flat. There is no distension.      Palpations: Abdomen is soft.      Tenderness: There is abdominal tenderness. There is no guarding.      Comments: Vague generalized tenderness.   Musculoskeletal:         General: No swelling, tenderness or deformity.      Cervical back: Neck supple. No rigidity. No muscular tenderness.      Right lower leg: No edema.      Left lower leg: No edema.   Lymphadenopathy:       Cervical: No cervical adenopathy.   Skin:     General: Skin is warm and dry.   Neurological:      General: No focal deficit present.      Mental Status: She is alert and oriented to person, place, and time.      Cranial Nerves: No cranial nerve deficit.      Motor: No weakness.      Comments: Drowsy and lethargic   Psychiatric:         Mood and Affect: Mood normal.         Behavior: Behavior normal.         Thought Content: Thought content normal.       Results Review:     Results from last 7 days   Lab Units 10/04/21  2007 10/04/21  1803   SODIUM mmol/L 142 137   POTASSIUM mmol/L 4.8 5.2   CHLORIDE mmol/L 113* 106   CO2 mmol/L 2.0* 4.0*   BUN mg/dL 17 18   CREATININE mg/dL 1.02* 1.16*   GLUCOSE mg/dL 214* 318*   CALCIUM mg/dL 9.3 9.5   BILIRUBIN mg/dL 0.2  --    ALK PHOS U/L 155*  --    ALT (SGPT) U/L 20  --    AST (SGOT) U/L 22  --        Results from last 7 days   Lab Units 10/04/21  2007 10/04/21  1803   MAGNESIUM mg/dL 2.4 2.8*   PHOSPHORUS mg/dL 2.0* 2.8       Results from last 7 days   Lab Units 10/04/21  1800   WBC 10*3/mm3 30.01*   HEMOGLOBIN g/dL 17.1*   HEMATOCRIT % 51.0*   PLATELETS 10*3/mm3 265           Imaging Results (Last 7 Days)     ** No results found for the last 168 hours. **          Assessment / Plan       Hospital Problem List:  Principal Problem:    DKA (diabetic ketoacidosis) ([facility])  Active Problems:    Acute UTI (urinary tract infection)    Sepsis, unspecified organism ([facility])  Essential hypertension  Metabolic acidosis    Plan  -Patient has DKA which is likely due to combination of urinary tract infection, sepsis and SGLT2 inhibitor  -We will start patient on IV fluids and IV insulin as per DKA protocol  -BMP every 4 hours and monitor for closure of anion gap  -N.p.o. except for sips with meds.  Okay for patient to have pure water  -Replete electrolytes  -We will start IV antibiotics with ceftriaxone for acute cystitis  -Follow-up blood cultures  -Hold home antihypertensive medications for  now  -DVT prophylaxis with subcu Lovenox  -CODE STATUS is full code    33 minutes of critical care time was spent evaluating patient, reviewing documentation and planning treatment.    I discussed the patient's findings and my recommendations with Patient.    I have utilized all available immediate resources to obtain, update, or review the patient's current medications.      I confirmed that the patient's Advance Care Plan is present, code status is documented, or surrogate decision maker is listed in the patient's medical record.      Raj Quinones MD  10/04/21  22:45 CDT        Part of this note may be an electronic transcription/translation of spoken language to printed text using the Dragon Dictation System.           Electronically signed by Raj Quinones MD at 10/04/21 7070       Emergency Department Notes    No notes of this type exist for this encounter.           Facility-Administered Medications as of 10/5/2021   Medication Dose Route Frequency Provider Last Rate Last Admin   • acetaminophen (TYLENOL) tablet 650 mg  650 mg Oral Q4H PRN Raj Quinones MD       • cefTRIAXone (ROCEPHIN) 2 g/100 mL 0.9% NS IVPB (MBP)  2 g Intravenous Q24H Raj Quinones MD   2 g at 10/04/21 1804   • dextrose (D50W) 25 g/ 50mL Intravenous Solution 12.5 g  12.5 g Intravenous PRN Raj Quinones MD       • dextrose 5 % and sodium chloride 0.45 % infusion  150 mL/hr Intravenous Continuous PRN Raj Quinones MD       • dextrose 5 % and sodium chloride 0.45 % with KCl 20 mEq/L infusion  150 mL/hr Intravenous Continuous PRN Raj Quinones MD   Stopped at 10/05/21 0515   • dextrose 5 % and sodium chloride 0.45 % with KCl 20 mEq/L infusion  200 mL/hr Intravenous Continuous PRN Michael Bell Jr.,  mL/hr at 10/05/21 0542 200 mL/hr at 10/05/21 0542   • dextrose 5 % and sodium chloride 0.45 % with KCl 40 mEq/L infusion  150 mL/hr Intravenous Continuous PRN Raj Quinones MD       •  dextrose 5 % and sodium chloride 0.9 % infusion  150 mL/hr Intravenous Continuous PRN Raj Quinones MD       • dextrose 5 % and sodium chloride 0.9 % with KCl 20 mEq/L infusion  150 mL/hr Intravenous Continuous PRN Raj Quinones MD       • dextrose 5 % and sodium chloride 0.9 % with KCl 40 mEq/L infusion  150 mL/hr Intravenous Continuous PRN Raj Quinones MD       • enoxaparin (LOVENOX) syringe 40 mg  40 mg Subcutaneous Q24H Raj Quinones MD   40 mg at 10/04/21 2118   • insulin detemir (LEVEMIR) injection 10 Units  10 Units Subcutaneous Daily Raj Quinones MD   10 Units at 10/05/21 0908   • insulin regular (HumuLIN R,NovoLIN R) 100 Units in sodium chloride 0.9 % 100 mL (1 Units/mL) infusion  10 Units/hr Intravenous Titrated Raj Quinones MD 7 mL/hr at 10/05/21 1006 7 Units/hr at 10/05/21 1006   • LORazepam (ATIVAN) injection 1 mg  1 mg Intravenous Q4H PRN Michael Bell Jr., MD   1 mg at 10/05/21 0839   • morphine injection 2 mg  2 mg Intravenous Q2H PRN Raj Quinones MD        And   • naloxone (NARCAN) injection 0.4 mg  0.4 mg Intravenous Q5 Min PRN Raj Quinones MD       • ondansetron (ZOFRAN) injection 4 mg  4 mg Intravenous Q6H PRN Raj Quinones MD       • [COMPLETED] Potassium Phosphates 15 mmol in sodium chloride 0.9 % 100 mL infusion  15 mmol Intravenous Once Michael Bell Jr., MD   15 mmol at 10/05/21 0315   • [COMPLETED] sodium bicarbonate injection 8.4% 50 mEq  50 mEq Intravenous Once Raj Quinones MD   50 mEq at 10/04/21 2255   • [COMPLETED] sodium bicarbonate injection 8.4% 50 mEq  50 mEq Intravenous Once Raj Quinones MD   50 mEq at 10/05/21 0920   • sodium chloride 0.45 % 1,000 mL with potassium chloride 40 mEq infusion  250 mL/hr Intravenous Continuous PRN Raj Quinones MD       • sodium chloride 0.45 % infusion  250 mL/hr Intravenous Continuous PRN Raj Quinones MD       • sodium chloride 0.45 % with KCl 20 mEq/L  infusion  250 mL/hr Intravenous Continuous PRN Raj Quinones MD   Stopped at 10/04/21 2352   • [COMPLETED] sodium chloride 0.9 % bolus 1,000 mL  1,000 mL Intravenous Once Raj Quinones MD 1,000 mL/hr at 10/04/21 1741 1,000 mL at 10/04/21 1741   • sodium chloride 0.9 % flush 10 mL  10 mL Intravenous PRN Raj Quinones MD       • sodium chloride 0.9 % flush 10 mL  10 mL Intravenous Once PRN Raj Quinones MD       • sodium chloride 0.9 % flush 10 mL  10 mL Intravenous Q12H Raj Quinones MD       • sodium chloride 0.9 % flush 10 mL  10 mL Intravenous PRN Raj Quinones MD       • sodium chloride 0.9 % infusion  250 mL/hr Intravenous Continuous PRN Raj Quinones MD       • sodium chloride 0.9 % infusion  10 mL/hr Intravenous Continuous PRN Raj Quinones MD       • sodium chloride 0.9 % with KCl 20 mEq/L infusion  250 mL/hr Intravenous Continuous PRN Raj Quinones MD       • sodium chloride 0.9 % with KCl 40 mEq/L infusion  250 mL/hr Intravenous Continuous PRN Raj Quinones MD           Lab Results (last 24 hours)     Procedure Component Value Units Date/Time    Urine Drug Screen - Urine, Clean Catch [[id number]]  (Abnormal) Collected: 10/05/21 0956    Specimen: Urine, Clean Catch Updated: 10/05/21 1017     THC, Screen, Urine Positive     Phencyclidine (PCP), Urine Negative     Cocaine Screen, Urine Negative     Methamphetamine, Ur Negative     Opiate Screen Negative     Amphetamine Screen, Urine Negative     Benzodiazepine Screen, Urine Positive     Tricyclic Antidepressants Screen Negative     Methadone Screen, Urine Negative     Barbiturates Screen, Urine Negative     Oxycodone Screen, Urine Negative     Propoxyphene Screen Negative     Buprenorphine, Screen, Urine Negative    Narrative:      Cutoff For Drugs Screened:    Amphetamines               500 ng/ml  Barbiturates               200 ng/ml  Benzodiazepines            150 ng/ml  Cocaine                     150 ng/ml  Methadone                  200 ng/ml  Opiates                    100 ng/ml  Phencyclidine               25 ng/ml  THC                            50 ng/ml  Methamphetamine            500 ng/ml  Tricyclic Antidepressants  300 ng/ml  Oxycodone                  100 ng/ml  Propoxyphene               300 ng/ml  Buprenorphine               10 ng/ml    The normal value for all drugs tested is negative. This report includes unconfirmed screening results, with the cutoff values listed, to be used for medical treatment purposes only.  Unconfirmed results must not be used for non-medical purposes such as employment or legal testing.  Clinical consideration should be applied to any drug of abuse test, particularly when unconfirmed results are used.      Basic Metabolic Panel [[id number]]  (Abnormal) Collected: 10/05/21 0824    Specimen: Blood Updated: 10/05/21 0907     Glucose 253 mg/dL      BUN 12 mg/dL      Creatinine 0.97 mg/dL      Sodium 149 mmol/L      Potassium 3.9 mmol/L      Comment: Slight hemolysis detected by analyzer. Results may be affected.        Chloride 120 mmol/L      CO2 3.0 mmol/L      Calcium 9.7 mg/dL      eGFR Non African Amer 68 mL/min/1.73      BUN/Creatinine Ratio 12.4     Anion Gap 26.0 mmol/L     Narrative:      GFR Normal >60  Chronic Kidney Disease <60  Kidney Failure <15      Phosphorus [[id number]]  (Abnormal) Collected: 10/05/21 0824    Specimen: Blood Updated: 10/05/21 0907     Phosphorus 1.4 mg/dL     Magnesium [[id number]]  (Normal) Collected: 10/05/21 0824    Specimen: Blood Updated: 10/05/21 0900     Magnesium 2.4 mg/dL     CBC & Differential [[id number]]  (Abnormal) Collected: 10/05/21 0405    Specimen: Blood Updated: 10/05/21 0511    Narrative:      The following orders were created for panel order CBC & Differential.  Procedure                               Abnormality         Status                     ---------                               -----------         ------                      CBC Auto Differential[[id number]]        Abnormal            Final result               Scan Slide[[id number]]                                                                    Please view results for these tests on the individual orders.    CBC Auto Differential [[id number]]  (Abnormal) Collected: 10/05/21 0405    Specimen: Blood Updated: 10/05/21 0511     WBC 22.13 10*3/mm3      RBC 5.45 10*6/mm3      Hemoglobin 16.5 g/dL      Hematocrit 48.6 %      MCV 89.2 fL      MCH 30.3 pg      MCHC 34.0 g/dL      RDW 13.6 %      RDW-SD 42.9 fl      MPV 11.8 fL      Platelets 219 10*3/mm3      Neutrophil % 80.3 %      Lymphocyte % 7.1 %      Monocyte % 9.0 %      Eosinophil % 0.0 %      Basophil % 0.4 %      Immature Grans % 3.2 %      Neutrophils, Absolute 17.77 10*3/mm3      Lymphocytes, Absolute 1.58 10*3/mm3      Monocytes, Absolute 1.99 10*3/mm3      Eosinophils, Absolute 0.00 10*3/mm3      Basophils, Absolute 0.08 10*3/mm3      Immature Grans, Absolute 0.71 10*3/mm3      nRBC 0.1 /100 WBC     Basic Metabolic Panel [[id number]]  (Abnormal) Collected: 10/05/21 0405    Specimen: Blood Updated: 10/05/21 0503     Glucose 217 mg/dL      BUN 13 mg/dL      Creatinine 0.98 mg/dL      Sodium 145 mmol/L      Potassium 4.6 mmol/L      Chloride 116 mmol/L      CO2 5.0 mmol/L      Calcium 9.3 mg/dL      eGFR Non African Amer 68 mL/min/1.73      BUN/Creatinine Ratio 13.3     Anion Gap 24.0 mmol/L     Narrative:      GFR Normal >60  Chronic Kidney Disease <60  Kidney Failure <15      Phosphorus [[id number]]  (Abnormal) Collected: 10/05/21 0405    Specimen: Blood Updated: 10/05/21 0503     Phosphorus 1.5 mg/dL     Magnesium [[id number]]  (Normal) Collected: 10/05/21 0405    Specimen: Blood Updated: 10/05/21 0456     Magnesium 2.4 mg/dL     Phosphorus [[id number]]  (Abnormal) Collected: 10/04/21 2359    Specimen: Blood Updated: 10/05/21 0046     Phosphorus 1.3 mg/dL     Basic Metabolic Panel [[id number]]  (Abnormal) Collected: 10/04/21  2359    Specimen: Blood Updated: 10/05/21 0038     Glucose 118 mg/dL      BUN 16 mg/dL      Creatinine 0.94 mg/dL      Sodium 146 mmol/L      Potassium 4.5 mmol/L      Chloride 114 mmol/L      CO2 6.0 mmol/L      Calcium 9.2 mg/dL      eGFR Non African Amer 71 mL/min/1.73      BUN/Creatinine Ratio 17.0     Anion Gap 26.0 mmol/L     Narrative:      GFR Normal >60  Chronic Kidney Disease <60  Kidney Failure <15      Magnesium [[id number]]  (Normal) Collected: 10/04/21 2359    Specimen: Blood Updated: 10/05/21 0022     Magnesium 2.2 mg/dL     POC Glucose Once [[id number]]  (Abnormal) Collected: 10/04/21 2302    Specimen: Blood Updated: 10/05/21 0014     Glucose 134 mg/dL      Comment: : [id number] LAN BROWNNEYMeter ID: [id number]       POC Glucose Once [[id number]]  (Abnormal) Collected: 10/04/21 2211    Specimen: Blood Updated: 10/05/21 0013     Glucose 162 mg/dL      Comment: : [id number] LAN ROURTNEYMeter ID: [id number]       POC Glucose Once [[id number]]  (Abnormal) Collected: 10/04/21 2113    Specimen: Blood Updated: 10/05/21 0013     Glucose 155 mg/dL      Comment: : [id number] LAN BROWNNEYMeter ID: [id number]       POC Glucose Once [[id number]]  (Abnormal) Collected: 10/04/21 1956    Specimen: Blood Updated: 10/05/21 0013     Glucose 210 mg/dL      Comment: : [id number] LAN ROURTNEYMeter ID: [id number]       POC Glucose Once [[id number]]  (Abnormal) Collected: 10/04/21 1927    Specimen: Blood Updated: 10/05/21 0013     Glucose 243 mg/dL      Comment: : [id number] LAN ROURTNEYMeter ID: [id number]       POC Glucose Once [[id number]]  (Abnormal) Collected: 10/04/21 1837    Specimen: Blood Updated: 10/05/21 0013     Glucose 278 mg/dL      Comment: : [id number] TONE MUNOZMetalysia ID: [id number]       COVID PRE-OP / PRE-PROCEDURE SCREENING ORDER (NO ISOLATION) - Swab, Nasopharynx [[id number]]  (Normal) Collected: 10/04/21 7607    Specimen: Swab from  Nasopharynx Updated: 10/05/21 0008    Narrative:      The following orders were created for panel order COVID PRE-OP / PRE-PROCEDURE SCREENING ORDER (NO ISOLATION) - Swab, Nasopharynx.  Procedure                               Abnormality         Status                     ---------                               -----------         ------                     Respiratory Panel PCR w/...[[id number]]  Normal              Final result                 Please view results for these tests on the individual orders.    Respiratory Panel PCR w/COVID-19(SARS-CoV-2) TOBY/MATTY/AHSAN/PAD/COR/MAD/BLANE In-House, NP Swab in UTM/VTM, 3-4 HR TAT - Swab, Nasopharynx [[id number]]  (Normal) Collected: 10/04/21 2252    Specimen: Swab from Nasopharynx Updated: 10/05/21 0008     ADENOVIRUS, PCR Not Detected     Coronavirus 229E Not Detected     Coronavirus HKU1 Not Detected     Coronavirus NL63 Not Detected     Coronavirus OC43 Not Detected     COVID19 Not Detected     Human Metapneumovirus Not Detected     Human Rhinovirus/Enterovirus Not Detected     Influenza A PCR Not Detected     Influenza B PCR Not Detected     Parainfluenza Virus 1 Not Detected     Parainfluenza Virus 2 Not Detected     Parainfluenza Virus 3 Not Detected     Parainfluenza Virus 4 Not Detected     RSV, PCR Not Detected     Bordetella pertussis pcr Not Detected     Bordetella parapertussis PCR Not Detected     Chlamydophila pneumoniae PCR Not Detected     Mycoplasma pneumo by PCR Not Detected    Narrative:      In the setting of a positive respiratory panel with a viral infection PLUS a negative procalcitonin without other underlying concern for bacterial infection, consider observing off antibiotics or discontinuation of antibiotics and continue supportive care. If the respiratory panel is positive for atypical bacterial infection (Bordetella pertussis, Chlamydophila pneumoniae, or Mycoplasma pneumoniae), consider antibiotic de-escalation to target atypical bacterial  infection.    Comprehensive Metabolic Panel [[id number]]  (Abnormal) Collected: 10/04/21 2007    Specimen: Blood Updated: 10/04/21 2041     Glucose 214 mg/dL      BUN 17 mg/dL      Creatinine 1.02 mg/dL      Sodium 142 mmol/L      Potassium 4.8 mmol/L      Comment: Slight hemolysis detected by analyzer. Results may be affected.        Chloride 113 mmol/L      CO2 2.0 mmol/L      Calcium 9.3 mg/dL      Total Protein 7.8 g/dL      Albumin 4.80 g/dL      ALT (SGPT) 20 U/L      AST (SGOT) 22 U/L      Comment: Slight hemolysis detected by analyzer. Results may be affected.        Alkaline Phosphatase 155 U/L      Total Bilirubin 0.2 mg/dL      eGFR Non African Amer 65 mL/min/1.73      Globulin 3.0 gm/dL      A/G Ratio 1.6 g/dL      BUN/Creatinine Ratio 16.7     Anion Gap 27.0 mmol/L     Narrative:      GFR Normal >60  Chronic Kidney Disease <60  Kidney Failure <15      Phosphorus [[id number]]  (Abnormal) Collected: 10/04/21 2007    Specimen: Blood Updated: 10/04/21 2040     Phosphorus 2.0 mg/dL     Magnesium [[id number]]  (Normal) Collected: 10/04/21 2007    Specimen: Blood Updated: 10/04/21 2028     Magnesium 2.4 mg/dL     Blood Gas, Venous - [[id number]]  (Abnormal) Collected: 10/04/21 2010    Specimen: Venous Blood Updated: 10/04/21 2017     Site OTHER     pH, Venous 6.943 pH Units      Comment: 85 Value below critical limit        pCO2, Venous 9.8 mm Hg      Comment: 84 Value below reference range        pO2, Venous 143.0 mm Hg      Comment: 83 Value above reference range        HCO3, Venous 2.1 mmol/L      Comment: 84 Value below reference range        Base Excess, Venous -28.5 mmol/L      Comment: 84 Value below reference range        O2 Saturation, Venous 99.1 %      Comment: 83 Value above reference range        Barometric Pressure for Blood Gas 747 mmHg      Comment: PATM not performed at this facility.        Modality Room Air     Ventilator Mode NA     Collected by DE     Comment: Meter: [id number]      :  [id number]       Manual Differential [[id number]]  (Abnormal) Collected: 10/04/21 1800    Specimen: Blood Updated: 10/04/21 1928     Neutrophil % 71.0 %      Lymphocyte % 10.0 %      Monocyte % 10.0 %      Eosinophil % 1.0 %      Bands %  7.0 %      Metamyelocyte % 1.0 %      Neutrophils Absolute 23.41 10*3/mm3      Lymphocytes Absolute 3.00 10*3/mm3      Monocytes Absolute 3.00 10*3/mm3      Eosinophils Absolute 0.30 10*3/mm3      RBC Morphology Normal     WBC Morphology Normal     Platelet Morphology Normal    Urinalysis, Microscopic Only - Urine, Clean Catch [[id number]]  (Abnormal) Collected: 10/04/21 1750    Specimen: Urine, Clean Catch Updated: 10/04/21 1854     RBC, UA 21-30 /HPF      WBC, UA 3-5 /HPF      Bacteria, UA 2+ /HPF      Squamous Epithelial Cells, UA 3-5 /HPF      Hyaline Casts, UA 21-30 /LPF      Methodology Manual Light Microscopy    Basic Metabolic Panel [[id number]]  (Abnormal) Collected: 10/04/21 1803    Specimen: Blood Updated: 10/04/21 1853     Glucose 318 mg/dL      BUN 18 mg/dL      Creatinine 1.16 mg/dL      Sodium 137 mmol/L      Potassium 5.2 mmol/L      Comment: Slight hemolysis detected by analyzer. Results may be affected.        Chloride 106 mmol/L      CO2 4.0 mmol/L      Calcium 9.5 mg/dL      eGFR Non African Amer 56 mL/min/1.73      BUN/Creatinine Ratio 15.5     Anion Gap 27.0 mmol/L     Narrative:      GFR Normal >60  Chronic Kidney Disease <60  Kidney Failure <15      Osmolality, Serum [[id number]]  (Abnormal) Collected: 10/04/21 1803    Specimen: Blood Updated: 10/04/21 1847     Osmolality 333 mOsm/kg     D-dimer, Quantitative [[id number]]  (Abnormal) Collected: 10/04/21 1800    Specimen: Blood Updated: 10/04/21 1837     D-Dimer, Quantitative 572 ng/mL (FEU)     Narrative:      Dimer values <500 ng/ml FEU are FDA approved as aid in diagnosis of deep venous thrombosis and pulmonary embolism.  This test should not be used in an exclusion strategy with pretest probability  alone.    A recent guideline regarding diagnosis for pulmonary thromboembolism recommends an adjusted exclusion criterion of age x 10 ng/ml FEU for patients >50 years of age ([employer] Med 2015; 163: 701-711).      Lactic Acid, Plasma [[id number]]  (Normal) Collected: 10/04/21 1800    Specimen: Blood Updated: 10/04/21 1836     Lactate 1.3 mmol/L     Phosphorus [[id number]]  (Normal) Collected: 10/04/21 1803    Specimen: Blood Updated: 10/04/21 1836     Phosphorus 2.8 mg/dL     Magnesium [[id number]]  (Abnormal) Collected: 10/04/21 1803    Specimen: Blood Updated: 10/04/21 1836     Magnesium 2.8 mg/dL     Hemoglobin A1c [[id number]]  (Abnormal) Collected: 10/04/21 1800    Specimen: Blood Updated: 10/04/21 1827     Hemoglobin A1C 13.10 %     Narrative:      Hemoglobin A1C Ranges:    Increased Risk for Diabetes  5.7% to 6.4%  Diabetes                     >= 6.5%  Diabetic Goal                < 7.0%    CBC & Differential [[id number]]  (Abnormal) Collected: 10/04/21 1800    Specimen: Blood Updated: 10/04/21 1814    Narrative:      The following orders were created for panel order CBC & Differential.  Procedure                               Abnormality         Status                     ---------                               -----------         ------                     CBC Auto Differential[[id number]]        Abnormal            Final result               Scan Slide[[id number]]                                                                    Please view results for these tests on the individual orders.    CBC Auto Differential [[id number]]  (Abnormal) Collected: 10/04/21 1800    Specimen: Blood Updated: 10/04/21 1814     WBC 30.01 10*3/mm3      RBC 5.58 10*6/mm3      Hemoglobin 17.1 g/dL      Hematocrit 51.0 %      MCV 91.4 fL      MCH 30.6 pg      MCHC 33.5 g/dL      RDW 13.3 %      RDW-SD 43.5 fl      MPV 12.0 fL      Platelets 265 10*3/mm3      Neutrophil % 77.1 %      Lymphocyte % 8.8 %      Monocyte % 10.1 %       Eosinophil % 0.0 %      Basophil % 0.5 %      Immature Grans % 3.5 %      Neutrophils, Absolute 23.12 10*3/mm3      Lymphocytes, Absolute 2.63 10*3/mm3      Monocytes, Absolute 3.04 10*3/mm3      Eosinophils, Absolute 0.00 10*3/mm3      Basophils, Absolute 0.16 10*3/mm3      Immature Grans, Absolute 1.06 10*3/mm3      nRBC 0.1 /100 WBC     Pregnancy, Urine - Urine, Clean Catch [[id number]]  (Normal) Collected: 10/04/21 1749    Specimen: Urine, Clean Catch Updated: 10/04/21 1810     HCG, Urine QL Negative    Blood Culture - Blood, Arm, Right [[id number]] Collected: 10/04/21 1700    Specimen: Blood from Arm, Right Updated: 10/04/21 1809    Blood Culture - Blood, Arm, Right [[id number]] Collected: 10/04/21 1759    Specimen: Blood from Arm, Right Updated: 10/04/21 1808    Urinalysis With Culture If Indicated - Urine, Clean Catch [[id number]]  (Abnormal) Collected: 10/04/21 1750    Specimen: Urine, Clean Catch Updated: 10/04/21 1808     Color, UA Yellow     Appearance, UA Cloudy     pH, UA <=5.0     Specific Gravity, UA 1.023     Glucose, UA >=1000 mg/dL (3+)     Ketones, UA 80 mg/dL (3+)     Bilirubin, UA Negative     Blood, UA Large (3+)     Protein,  mg/dL (2+)     Leuk Esterase, UA Negative     Nitrite, UA Negative     Urobilinogen, UA 0.2 E.U./dL        Imaging Results (Last 24 Hours)     ** No results found for the last 24 hours. **        ECG/EMG Results (last 24 hours)     Procedure Component Value Units Date/Time    ECG 12 Lead [[id number]] Collected: 10/05/21 0851     Updated: 10/05/21 0857     QT Interval 296 ms      QTC Interval 440 ms     Narrative:      Test Reason : Tachycardia  Blood Pressure :   */*   mmHG  Vent. Rate : 133 BPM     Atrial Rate : 133 BPM     P-R Int : 142 ms          QRS Dur :  84 ms      QT Int : 296 ms       P-R-T Axes :  63  23  55 degrees     QTc Int : 440 ms    Sinus tachycardia  Otherwise normal ECG  No previous ECGs available    Referred By:            Confirmed By:            Physician Progress Notes (last 24 hours) (Notes from 10/04/21 1052 through 10/05/21 1052)    No notes of this type exist for this encounter.         Consult Notes (last 24 hours) (Notes from 10/04/21 1052 through 10/05/21 1052)    No notes of this type exist for this encounter.

## 2021-10-05 NOTE — PLAN OF CARE
Goal Outcome Evaluation:              Outcome Summary: Pt NPO. Insulin drip continues. Pt is confused and RD unable to obtain any nutrition hx info. Will monitor course and make recs/provide ADA diet ed as appropriate.

## 2021-10-05 NOTE — PLAN OF CARE
Goal Outcome Evaluation:   Pt remains tachycardiac.  aware. Orders received to inc fluids. Pt remains borderline hypertensive. UOP adequate. Pt complains of a sore throat/ dry mouth. Pt a/o to orientation questions, but remains confused during conversation. Pt placed in posey vest due to the fact she was crawling out of bed and is a fall risk. Bed alarm remains on. Pt is confused and tugging at lines. Pt removed one of her PIV. Pt remains on insulin drip and DKA fluid protocol. Pt given potassium phosphate and bicarb during the night. Pt also swabbed for COVID. Will cont to monitor closely.

## 2021-10-06 LAB
ACETONE BLD QL: ABNORMAL
ANION GAP SERPL CALCULATED.3IONS-SCNC: 19 MMOL/L (ref 5–15)
ANION GAP SERPL CALCULATED.3IONS-SCNC: 20 MMOL/L (ref 5–15)
ANION GAP SERPL CALCULATED.3IONS-SCNC: 20 MMOL/L (ref 5–15)
ANION GAP SERPL CALCULATED.3IONS-SCNC: 21 MMOL/L (ref 5–15)
ANION GAP SERPL CALCULATED.3IONS-SCNC: 21 MMOL/L (ref 5–15)
ANION GAP SERPL CALCULATED.3IONS-SCNC: 23 MMOL/L (ref 5–15)
BACTERIA UR QL AUTO: ABNORMAL /HPF
BASOPHILS # BLD AUTO: 0.02 10*3/MM3 (ref 0–0.2)
BASOPHILS NFR BLD AUTO: 0.1 % (ref 0–1.5)
BILIRUB UR QL STRIP: NEGATIVE
BUN SERPL-MCNC: 5 MG/DL (ref 6–20)
BUN SERPL-MCNC: 6 MG/DL (ref 6–20)
BUN SERPL-MCNC: 7 MG/DL (ref 6–20)
BUN SERPL-MCNC: 8 MG/DL (ref 6–20)
BUN/CREAT SERPL: 10 (ref 7–25)
BUN/CREAT SERPL: 6.3 (ref 7–25)
BUN/CREAT SERPL: 7.9 (ref 7–25)
BUN/CREAT SERPL: 8.1 (ref 7–25)
BUN/CREAT SERPL: 8.2 (ref 7–25)
BUN/CREAT SERPL: 8.8 (ref 7–25)
CALCIUM SPEC-SCNC: 8.8 MG/DL (ref 8.6–10.5)
CALCIUM SPEC-SCNC: 9 MG/DL (ref 8.6–10.5)
CALCIUM SPEC-SCNC: 9.1 MG/DL (ref 8.6–10.5)
CALCIUM SPEC-SCNC: 9.3 MG/DL (ref 8.6–10.5)
CALCIUM SPEC-SCNC: 9.6 MG/DL (ref 8.6–10.5)
CALCIUM SPEC-SCNC: 9.7 MG/DL (ref 8.6–10.5)
CHLORIDE SERPL-SCNC: 128 MMOL/L (ref 98–107)
CHLORIDE SERPL-SCNC: 129 MMOL/L (ref 98–107)
CHLORIDE SERPL-SCNC: 130 MMOL/L (ref 98–107)
CHLORIDE SERPL-SCNC: 130 MMOL/L (ref 98–107)
CHLORIDE SERPL-SCNC: 131 MMOL/L (ref 98–107)
CHLORIDE SERPL-SCNC: 133 MMOL/L (ref 98–107)
CHLORIDE UR-SCNC: 98 MMOL/L
CLARITY UR: CLEAR
CO2 SERPL-SCNC: 10 MMOL/L (ref 22–29)
CO2 SERPL-SCNC: 13 MMOL/L (ref 22–29)
COLOR UR: YELLOW
CREAT SERPL-MCNC: 0.73 MG/DL (ref 0.57–1)
CREAT SERPL-MCNC: 0.74 MG/DL (ref 0.57–1)
CREAT SERPL-MCNC: 0.76 MG/DL (ref 0.57–1)
CREAT SERPL-MCNC: 0.8 MG/DL (ref 0.57–1)
D-LACTATE SERPL-SCNC: 0.9 MMOL/L (ref 0.5–2)
DEPRECATED RDW RBC AUTO: 43.8 FL (ref 37–54)
EOSINOPHIL # BLD AUTO: 0 10*3/MM3 (ref 0–0.4)
EOSINOPHIL NFR BLD AUTO: 0 % (ref 0.3–6.2)
ERYTHROCYTE [DISTWIDTH] IN BLOOD BY AUTOMATED COUNT: 14.4 % (ref 12.3–15.4)
GFR SERPL CREATININE-BSD FRML MDRD: 85 ML/MIN/1.73
GFR SERPL CREATININE-BSD FRML MDRD: 91 ML/MIN/1.73
GFR SERPL CREATININE-BSD FRML MDRD: 93 ML/MIN/1.73
GFR SERPL CREATININE-BSD FRML MDRD: 95 ML/MIN/1.73
GLUCOSE BLDC GLUCOMTR-MCNC: 135 MG/DL (ref 70–130)
GLUCOSE BLDC GLUCOMTR-MCNC: 154 MG/DL (ref 70–130)
GLUCOSE BLDC GLUCOMTR-MCNC: 161 MG/DL (ref 70–130)
GLUCOSE BLDC GLUCOMTR-MCNC: 168 MG/DL (ref 70–130)
GLUCOSE BLDC GLUCOMTR-MCNC: 175 MG/DL (ref 70–130)
GLUCOSE BLDC GLUCOMTR-MCNC: 175 MG/DL (ref 70–130)
GLUCOSE BLDC GLUCOMTR-MCNC: 177 MG/DL (ref 70–130)
GLUCOSE BLDC GLUCOMTR-MCNC: 177 MG/DL (ref 70–130)
GLUCOSE BLDC GLUCOMTR-MCNC: 180 MG/DL (ref 70–130)
GLUCOSE BLDC GLUCOMTR-MCNC: 186 MG/DL (ref 70–130)
GLUCOSE BLDC GLUCOMTR-MCNC: 188 MG/DL (ref 70–130)
GLUCOSE BLDC GLUCOMTR-MCNC: 201 MG/DL (ref 70–130)
GLUCOSE BLDC GLUCOMTR-MCNC: 209 MG/DL (ref 70–130)
GLUCOSE BLDC GLUCOMTR-MCNC: 211 MG/DL (ref 70–130)
GLUCOSE BLDC GLUCOMTR-MCNC: 235 MG/DL (ref 70–130)
GLUCOSE SERPL-MCNC: 155 MG/DL (ref 65–99)
GLUCOSE SERPL-MCNC: 170 MG/DL (ref 65–99)
GLUCOSE SERPL-MCNC: 180 MG/DL (ref 65–99)
GLUCOSE SERPL-MCNC: 182 MG/DL (ref 65–99)
GLUCOSE SERPL-MCNC: 189 MG/DL (ref 65–99)
GLUCOSE SERPL-MCNC: 242 MG/DL (ref 65–99)
GLUCOSE UR STRIP-MCNC: ABNORMAL MG/DL
HCT VFR BLD AUTO: 41.1 % (ref 34–46.6)
HGB BLD-MCNC: 14.5 G/DL (ref 12–15.9)
HGB UR QL STRIP.AUTO: ABNORMAL
HYALINE CASTS UR QL AUTO: ABNORMAL /LPF
IMM GRANULOCYTES # BLD AUTO: 0.12 10*3/MM3 (ref 0–0.05)
IMM GRANULOCYTES NFR BLD AUTO: 0.9 % (ref 0–0.5)
KETONES UR QL STRIP: ABNORMAL
LEUKOCYTE ESTERASE UR QL STRIP.AUTO: NEGATIVE
LYMPHOCYTES # BLD AUTO: 0.95 10*3/MM3 (ref 0.7–3.1)
LYMPHOCYTES NFR BLD AUTO: 6.9 % (ref 19.6–45.3)
MAGNESIUM SERPL-MCNC: 2.1 MG/DL (ref 1.6–2.6)
MAGNESIUM SERPL-MCNC: 2.3 MG/DL (ref 1.6–2.6)
MAGNESIUM SERPL-MCNC: 2.6 MG/DL (ref 1.6–2.6)
MCH RBC QN AUTO: 30.5 PG (ref 26.6–33)
MCHC RBC AUTO-ENTMCNC: 35.3 G/DL (ref 31.5–35.7)
MCV RBC AUTO: 86.5 FL (ref 79–97)
MONOCYTES # BLD AUTO: 1.06 10*3/MM3 (ref 0.1–0.9)
MONOCYTES NFR BLD AUTO: 7.7 % (ref 5–12)
NEUTROPHILS NFR BLD AUTO: 11.54 10*3/MM3 (ref 1.7–7)
NEUTROPHILS NFR BLD AUTO: 84.4 % (ref 42.7–76)
NITRITE UR QL STRIP: NEGATIVE
NRBC BLD AUTO-RTO: 0 /100 WBC (ref 0–0.2)
OSMOLALITY UR: 735 MOSM/KG (ref 38–1400)
PH UR STRIP.AUTO: 5.5 [PH] (ref 5–9)
PHOSPHATE SERPL-MCNC: 1.3 MG/DL (ref 2.5–4.5)
PHOSPHATE SERPL-MCNC: 1.4 MG/DL (ref 2.5–4.5)
PHOSPHATE SERPL-MCNC: 1.7 MG/DL (ref 2.5–4.5)
PHOSPHATE SERPL-MCNC: 1.7 MG/DL (ref 2.5–4.5)
PHOSPHATE SERPL-MCNC: 1.8 MG/DL (ref 2.5–4.5)
PHOSPHATE SERPL-MCNC: 1.9 MG/DL (ref 2.5–4.5)
PLATELET # BLD AUTO: 194 10*3/MM3 (ref 140–450)
PMV BLD AUTO: 12 FL (ref 6–12)
POTASSIUM SERPL-SCNC: 2.9 MMOL/L (ref 3.5–5.2)
POTASSIUM SERPL-SCNC: 3 MMOL/L (ref 3.5–5.2)
POTASSIUM SERPL-SCNC: 3.1 MMOL/L (ref 3.5–5.2)
POTASSIUM SERPL-SCNC: 3.2 MMOL/L (ref 3.5–5.2)
POTASSIUM SERPL-SCNC: 3.2 MMOL/L (ref 3.5–5.2)
POTASSIUM SERPL-SCNC: 3.3 MMOL/L (ref 3.5–5.2)
POTASSIUM UR-SCNC: 36.5 MMOL/L
PROT UR QL STRIP: ABNORMAL
QT INTERVAL: 296 MS
QTC INTERVAL: 440 MS
RBC # BLD AUTO: 4.75 10*6/MM3 (ref 3.77–5.28)
RBC # UR: ABNORMAL /HPF
REF LAB TEST METHOD: ABNORMAL
SODIUM SERPL-SCNC: 159 MMOL/L (ref 136–145)
SODIUM SERPL-SCNC: 160 MMOL/L (ref 136–145)
SODIUM SERPL-SCNC: 161 MMOL/L (ref 136–145)
SODIUM SERPL-SCNC: 166 MMOL/L (ref 136–145)
SODIUM UR-SCNC: 53 MMOL/L
SP GR UR STRIP: 1.03 (ref 1–1.03)
SQUAMOUS #/AREA URNS HPF: ABNORMAL /HPF
UROBILINOGEN UR QL STRIP: ABNORMAL
WBC # BLD AUTO: 13.69 10*3/MM3 (ref 3.4–10.8)
WBC UR QL AUTO: ABNORMAL /HPF

## 2021-10-06 PROCEDURE — 84100 ASSAY OF PHOSPHORUS: CPT | Performed by: INTERNAL MEDICINE

## 2021-10-06 PROCEDURE — 25010000002 MORPHINE PER 10 MG: Performed by: INTERNAL MEDICINE

## 2021-10-06 PROCEDURE — 63710000001 INSULIN DETEMIR PER 5 UNITS: Performed by: INTERNAL MEDICINE

## 2021-10-06 PROCEDURE — 83735 ASSAY OF MAGNESIUM: CPT | Performed by: INTERNAL MEDICINE

## 2021-10-06 PROCEDURE — 86341 ISLET CELL ANTIBODY: CPT | Performed by: INTERNAL MEDICINE

## 2021-10-06 PROCEDURE — 82962 GLUCOSE BLOOD TEST: CPT

## 2021-10-06 PROCEDURE — 85025 COMPLETE CBC W/AUTO DIFF WBC: CPT | Performed by: INTERNAL MEDICINE

## 2021-10-06 PROCEDURE — 25010000002 ENOXAPARIN PER 10 MG: Performed by: INTERNAL MEDICINE

## 2021-10-06 PROCEDURE — 84133 ASSAY OF URINE POTASSIUM: CPT | Performed by: INTERNAL MEDICINE

## 2021-10-06 PROCEDURE — 82009 KETONE BODYS QUAL: CPT | Performed by: INTERNAL MEDICINE

## 2021-10-06 PROCEDURE — 82436 ASSAY OF URINE CHLORIDE: CPT | Performed by: INTERNAL MEDICINE

## 2021-10-06 PROCEDURE — 84681 ASSAY OF C-PEPTIDE: CPT | Performed by: INTERNAL MEDICINE

## 2021-10-06 PROCEDURE — 83605 ASSAY OF LACTIC ACID: CPT

## 2021-10-06 PROCEDURE — 25010000002 POTASSIUM CHLORIDE PER 2 MEQ OF POTASSIUM: Performed by: INTERNAL MEDICINE

## 2021-10-06 PROCEDURE — 25010000002 CEFTRIAXONE PER 250 MG: Performed by: INTERNAL MEDICINE

## 2021-10-06 PROCEDURE — 84300 ASSAY OF URINE SODIUM: CPT | Performed by: INTERNAL MEDICINE

## 2021-10-06 PROCEDURE — 81001 URINALYSIS AUTO W/SCOPE: CPT | Performed by: INTERNAL MEDICINE

## 2021-10-06 PROCEDURE — 82570 ASSAY OF URINE CREATININE: CPT | Performed by: INTERNAL MEDICINE

## 2021-10-06 PROCEDURE — 25010000002 LORAZEPAM PER 2 MG: Performed by: INTERNAL MEDICINE

## 2021-10-06 PROCEDURE — 84540 ASSAY OF URINE/UREA-N: CPT | Performed by: INTERNAL MEDICINE

## 2021-10-06 PROCEDURE — 80048 BASIC METABOLIC PNL TOTAL CA: CPT | Performed by: INTERNAL MEDICINE

## 2021-10-06 PROCEDURE — 83935 ASSAY OF URINE OSMOLALITY: CPT | Performed by: INTERNAL MEDICINE

## 2021-10-06 PROCEDURE — 25010000003 INSULIN REGULAR HUMAN PER 5 UNITS: Performed by: INTERNAL MEDICINE

## 2021-10-06 PROCEDURE — 83605 ASSAY OF LACTIC ACID: CPT | Performed by: INTERNAL MEDICINE

## 2021-10-06 RX ORDER — POTASSIUM CHLORIDE, DEXTROSE MONOHYDRATE 150; 5 MG/100ML; G/100ML
500 INJECTION, SOLUTION INTRAVENOUS CONTINUOUS
Status: DISCONTINUED | OUTPATIENT
Start: 2021-10-06 | End: 2021-10-06 | Stop reason: ALTCHOICE

## 2021-10-06 RX ADMIN — LORAZEPAM 1 MG: 2 INJECTION INTRAMUSCULAR; INTRAVENOUS at 05:24

## 2021-10-06 RX ADMIN — POTASSIUM CHLORIDE: 149 INJECTION, SOLUTION, CONCENTRATE INTRAVENOUS at 11:04

## 2021-10-06 RX ADMIN — LORAZEPAM 1 MG: 2 INJECTION INTRAMUSCULAR; INTRAVENOUS at 21:55

## 2021-10-06 RX ADMIN — MORPHINE SULFATE 2 MG: 2 INJECTION, SOLUTION INTRAMUSCULAR; INTRAVENOUS at 22:59

## 2021-10-06 RX ADMIN — POTASSIUM CHLORIDE: 149 INJECTION, SOLUTION, CONCENTRATE INTRAVENOUS at 14:31

## 2021-10-06 RX ADMIN — POTASSIUM CHLORIDE, DEXTROSE MONOHYDRATE AND SODIUM CHLORIDE 150 ML/HR: 300; 5; 450 INJECTION, SOLUTION INTRAVENOUS at 01:56

## 2021-10-06 RX ADMIN — POTASSIUM CHLORIDE: 149 INJECTION, SOLUTION, CONCENTRATE INTRAVENOUS at 11:18

## 2021-10-06 RX ADMIN — INSULIN DETEMIR 10 UNITS: 100 INJECTION, SOLUTION SUBCUTANEOUS at 20:02

## 2021-10-06 RX ADMIN — POTASSIUM PHOSPHATE, MONOBASIC AND POTASSIUM PHOSPHATE, DIBASIC 30 MMOL: 224; 236 INJECTION, SOLUTION, CONCENTRATE INTRAVENOUS at 05:24

## 2021-10-06 RX ADMIN — LORAZEPAM 1 MG: 2 INJECTION INTRAMUSCULAR; INTRAVENOUS at 11:17

## 2021-10-06 RX ADMIN — SODIUM CHLORIDE, PRESERVATIVE FREE 10 ML: 5 INJECTION INTRAVENOUS at 20:27

## 2021-10-06 RX ADMIN — SODIUM CHLORIDE 1 UNITS/HR: 9 INJECTION, SOLUTION INTRAVENOUS at 20:56

## 2021-10-06 RX ADMIN — CEFTRIAXONE SODIUM 2 G: 2 INJECTION, POWDER, FOR SOLUTION INTRAMUSCULAR; INTRAVENOUS at 18:31

## 2021-10-06 RX ADMIN — ENOXAPARIN SODIUM 40 MG: 40 INJECTION SUBCUTANEOUS at 18:31

## 2021-10-06 RX ADMIN — POTASSIUM CHLORIDE: 149 INJECTION, SOLUTION, CONCENTRATE INTRAVENOUS at 20:02

## 2021-10-06 RX ADMIN — POTASSIUM PHOSPHATE, MONOBASIC AND POTASSIUM PHOSPHATE, DIBASIC 30 MMOL: 224; 236 INJECTION, SOLUTION, CONCENTRATE INTRAVENOUS at 21:54

## 2021-10-06 RX ADMIN — INSULIN DETEMIR 10 UNITS: 100 INJECTION, SOLUTION SUBCUTANEOUS at 11:03

## 2021-10-06 RX ADMIN — POTASSIUM CHLORIDE, DEXTROSE MONOHYDRATE AND SODIUM CHLORIDE 150 ML/HR: 300; 5; 450 INJECTION, SOLUTION INTRAVENOUS at 09:13

## 2021-10-06 NOTE — PROGRESS NOTES
[facility] Medicine Services  INPATIENT PROGRESS NOTE    Length of Stay: 2  Date of Admission: 10/4/2021  Primary Care Physician: Bj Duckworth MD    Subjective   Chief Complaint: Dysuria, abdominal pain, confusion    HPI: Patient continues to have confusion and lethargy. She complains of some vague abdominal pain.  She required Ativan and restraints due to agitation yesterday.  Glucose has come down but anion gap remains open. She also has hypernatremia.    Review of Systems   Unable to perform ROS: Mental status change     Objective    Temp:  [97.5 °F (36.4 °C)-98.8 °F (37.1 °C)] 98.8 °F (37.1 °C)  Heart Rate:  [106-135] 109  Resp:  [20-26] 24  BP: (124-172)/(58-92) 141/77    Physical Exam  Constitutional:       General: She is not in acute distress.     Appearance: She is ill-appearing. She is not diaphoretic.      Comments: Drowsy, lethargic.   HENT:      Head: Normocephalic and atraumatic.      Right Ear: External ear normal.      Left Ear: External ear normal.      Nose: No congestion or rhinorrhea.      Mouth/Throat:      Mouth: Mucous membranes are dry.      Pharynx: No oropharyngeal exudate or posterior oropharyngeal erythema.   Eyes:      General: No scleral icterus.     Extraocular Movements: Extraocular movements intact.      Conjunctiva/sclera: Conjunctivae normal.   Cardiovascular:      Rate and Rhythm: Regular rhythm. Tachycardia present.      Heart sounds: Normal heart sounds. No murmur heard.     Pulmonary:      Effort: Pulmonary effort is normal. No respiratory distress.      Breath sounds: Normal breath sounds. No wheezing, rhonchi or rales.   Abdominal:      General: Abdomen is flat. There is no distension.      Palpations: Abdomen is soft.      Tenderness: There is abdominal tenderness. There is no guarding.      Comments: Vague abdominal tenderness noted.   Musculoskeletal:         General: No swelling, tenderness or deformity.      Cervical back:  Neck supple. No rigidity. No muscular tenderness.      Right lower leg: No edema.      Left lower leg: No edema.   Lymphadenopathy:      Cervical: No cervical adenopathy.   Skin:     General: Skin is warm and dry.   Neurological:      General: No focal deficit present.      Cranial Nerves: No cranial nerve deficit.      Motor: No weakness.      Comments: Confused.   Psychiatric:         Mood and Affect: Mood normal.       Medication Review:    Current Facility-Administered Medications:   •  acetaminophen (TYLENOL) tablet 650 mg, 650 mg, Oral, Q4H PRN, Raj Quinones MD  •  cefTRIAXone (ROCEPHIN) 2 g/100 mL 0.9% NS IVPB (MBP), 2 g, Intravenous, Q24H, Raj Quinones MD, 2 g at 10/05/21 1710  •  cloNIDine (CATAPRES) tablet 0.1 mg, 0.1 mg, Oral, Daily, Michael Bell Jr., MD, 0.1 mg at 10/05/21 2157  •  dextrose (D50W) 25 g/ 50mL Intravenous Solution 12.5 g, 12.5 g, Intravenous, PRN, Raj Quinones MD  •  dextrose 5 % and sodium chloride 0.45 % infusion, 150 mL/hr, Intravenous, Continuous PRN, Raj Quinones MD  •  dextrose 5 % and sodium chloride 0.45 % with KCl 20 mEq/L infusion, 150 mL/hr, Intravenous, Continuous PRN, Raj Quinones MD, Stopped at 10/05/21 0515  •  dextrose 5 % and sodium chloride 0.45 % with KCl 20 mEq/L infusion, 200 mL/hr, Intravenous, Continuous PRN, Michael Bell Jr., MD, Last Rate: 200 mL/hr at 10/05/21 2253, 200 mL/hr at 10/05/21 2253  •  dextrose 5 % and sodium chloride 0.45 % with KCl 40 mEq/L infusion, 150 mL/hr, Intravenous, Continuous PRN, Raj Quinones MD, Stopped at 10/06/21 1100  •  dextrose 5 % and sodium chloride 0.9 % infusion, 150 mL/hr, Intravenous, Continuous PRN, Raj Quinones MD  •  dextrose 5 % and sodium chloride 0.9 % with KCl 20 mEq/L infusion, 150 mL/hr, Intravenous, Continuous PRN, Raj Quinones MD  •  dextrose 5 % and sodium chloride 0.9 % with KCl 40 mEq/L infusion, 150 mL/hr, Intravenous, Continuous PRN, Stacie,  Raj SNOWDEN MD  •  enoxaparin (LOVENOX) syringe 40 mg, 40 mg, Subcutaneous, Q24H, Raj Quinones MD, 40 mg at 10/05/21 1710  •  insulin detemir (LEVEMIR) injection 10 Units, 10 Units, Subcutaneous, Q12H, Raj Quinones MD, 10 Units at 10/06/21 1103  •  insulin regular (HumuLIN R,NovoLIN R) 100 Units in sodium chloride 0.9 % 100 mL (1 Units/mL) infusion, 10 Units/hr, Intravenous, Titrated, Raj Quinones MD, Last Rate: 3 mL/hr at 10/06/21 1323, 3 Units/hr at 10/06/21 1323  •  lisinopril (PRINIVIL,ZESTRIL) tablet 20 mg, 20 mg, Oral, Q24H, Michael Bell Jr., MD, 20 mg at 10/05/21 2157  •  LORazepam (ATIVAN) injection 1 mg, 1 mg, Intravenous, Q4H PRN, Michael Bell Jr., MD, 1 mg at 10/06/21 1117  •  morphine injection 2 mg, 2 mg, Intravenous, Q2H PRN **AND** naloxone (NARCAN) injection 0.4 mg, 0.4 mg, Intravenous, Q5 Min PRN, Raj Qiunones MD  •  ondansetron (ZOFRAN) injection 4 mg, 4 mg, Intravenous, Q6H PRN, Raj Quinones MD  •  potassium phosphate 45 mmol in sodium chloride 0.9 % 500 mL infusion, 45 mmol, Intravenous, PRN, Last Rate: 62.5 mL/hr at 10/05/21 1345, 45 mmol at 10/05/21 1345 **OR** potassium phosphate 30 mmol in sodium chloride 0.9 % 250 mL infusion, 30 mmol, Intravenous, PRN, Last Rate: 31.3 mL/hr at 10/06/21 0524, 30 mmol at 10/06/21 0524 **OR** Potassium Phosphates 15 mmol in sodium chloride 0.9 % 100 mL infusion, 15 mmol, Intravenous, PRN **OR** sodium phosphates 45 mmol in sodium chloride 0.9 % 500 mL IVPB, 45 mmol, Intravenous, PRN **OR** sodium phosphates 30 mmol in sodium chloride 0.9 % 250 mL IVPB, 30 mmol, Intravenous, PRN **OR** sodium phosphates 15 mmol in sodium chloride 0.9 % 250 mL IVPB, 15 mmol, Intravenous, PRN, Raj Quinones MD  •  sodium chloride 0.45 % 1,000 mL with potassium chloride 40 mEq infusion, 250 mL/hr, Intravenous, Continuous PRN, Raj Quinones MD  •  sodium chloride 0.45 % infusion, 250 mL/hr, Intravenous, Continuous PRN,  Raj Quinones MD  •  sodium chloride 0.45 % with KCl 20 mEq/L infusion, 250 mL/hr, Intravenous, Continuous PRN, Raj Quinones MD, Stopped at 10/04/21 2352  •  sodium chloride 0.9 % flush 10 mL, 10 mL, Intravenous, PRN, Raj Quinones MD  •  sodium chloride 0.9 % flush 10 mL, 10 mL, Intravenous, Once PRN, Raj Quinones MD  •  sodium chloride 0.9 % flush 10 mL, 10 mL, Intravenous, Q12H, Raj Quinones MD  •  sodium chloride 0.9 % flush 10 mL, 10 mL, Intravenous, PRN, Raj Quinones MD  •  sodium chloride 0.9 % infusion, 250 mL/hr, Intravenous, Continuous PRN, Raj Quinones MD  •  sodium chloride 0.9 % infusion, 10 mL/hr, Intravenous, Continuous PRN, Raj Quinones MD  •  sodium chloride 0.9 % with KCl 20 mEq/L infusion, 250 mL/hr, Intravenous, Continuous PRStacie MOHR Sotonte E, MD  •  sodium chloride 0.9 % with KCl 40 mEq/L infusion, 250 mL/hr, Intravenous, Continuous PRN, Raj Quinones MD    I have reviewed the patient's current medications.     Results Review:  I have reviewed the labs, radiology results, and diagnostic studies.    Laboratory Data:   Results from last 7 days   Lab Units 10/06/21  1203 10/06/21  0936 10/06/21  0420 10/04/21  2359 10/04/21  2007   SODIUM mmol/L 161* 166* 160*   < > 142   POTASSIUM mmol/L 3.2* 3.2* 2.9*   < > 4.8   CHLORIDE mmol/L 130* 133* 130*   < > 113*   CO2 mmol/L 10.0* 10.0* 10.0*   < > 2.0*   BUN mg/dL 6 6 7   < > 17   CREATININE mg/dL 0.73 0.74 0.80   < > 1.02*   GLUCOSE mg/dL 242* 170* 182*   < > 214*   CALCIUM mg/dL 9.0 9.1 9.3   < > 9.3   BILIRUBIN mg/dL  --   --   --   --  0.2   ALK PHOS U/L  --   --   --   --  155*   ALT (SGPT) U/L  --   --   --   --  20   AST (SGOT) U/L  --   --   --   --  22   ANION GAP mmol/L 21.0* 23.0* 20.0*   < > 27.0*    < > = values in this interval not displayed.     Estimated Creatinine Clearance: 140.6 mL/min (by C-G formula based on SCr of 0.73 mg/dL).  Results from last 7 days   Lab Units  10/06/21  1203 10/06/21  0753 10/06/21  0420   MAGNESIUM mg/dL 2.3 2.3 2.3   PHOSPHORUS mg/dL 1.9* 1.8* 1.3*         Results from last 7 days   Lab Units 10/06/21  0420 10/05/21  0405 10/04/21  1800   WBC 10*3/mm3 13.69* 22.13* 30.01*   HEMOGLOBIN g/dL 14.5 16.5* 17.1*   HEMATOCRIT % 41.1 48.6* 51.0*   PLATELETS 10*3/mm3 194 219 265           Culture Data:   Blood Culture   Date Value Ref Range Status   10/04/2021 No growth at 24 hours  Preliminary   10/04/2021 No growth at 24 hours  Preliminary     No results found for: URINECX  No results found for: RESPCX  No results found for: WOUNDCX  No results found for: STOOLCX  No components found for: BODYFLD    Radiology Data:   Imaging Results (Last 24 Hours)     Procedure Component Value Units Date/Time    US Guided Vascular Access [[id number]] Collected: 10/05/21 1422     Updated: 10/05/21 1853    Narrative:      EXAM: US GUIDANCE FOR VASCULAR ACCESS    INDICATION: Intravenous access    FINDINGS:  Real-time ultrasound imaging was utilized to visualize needle  entry into the patient right basilic vein during midline catheter  placement. 2 images were stored for recording and reporting.      Impression:      Imaging obtained during vascular access device placement under  ultrasound guidance.    Electronically signed by:  Chanel Grajeda MD  10/5/2021  2:42 PM CDT Workstation: [id number]          Assessment/Plan     Hospital Problem List:  Principal Problem:    DKA (diabetic ketoacidosis) ([facility])  Active Problems:    Acute UTI (urinary tract infection)    Sepsis, unspecified organism ([facility])  Metabolic acidosis  Hypernatremia     Plan  -Patient has DKA which is likely due to combination of urinary tract infection, sepsis and SGLT2 inhibitor  -She remains significantly dehydrated clinically despite IV fluids as per DKA protocol  -Anion gap remains open at this time patient also has hypernatremia  -We will give IV D5 with 40 of K 1 L bolus and then continue with D5 with  40 of K at 250 an hour for now due to hypernatremia  -Continue IV insulin as per DKA protocol with aim to correct anion gap.  Titrate insulin up for hyperglycemia with target blood glucose around 150 mg/DL  -Continue Levemir 10 units every 12 hours  -BMP every 4 hours and monitor for closure of anion gap  -We will have endocrinology consultation for evaluation and recommendations  -Beta hydroxybutyrate is pending but patient had normal lactic acid on admission  -We will check plasma acetone  -N.p.o. except for sips with meds. Okay for patient to have pure water and ice chips  -Replete electrolytes  -Continue IV antibiotics with ceftriaxone for acute cystitis  -CT of the abdomen and pelvis did not show any pyelonephritis or intra-abdominal infection  -Follow-up blood cultures  -Continue home antihypertensive medications  -DVT prophylaxis with subcu Lovenox  -CODE STATUS is full code     33 minutes of critical care time was spent evaluating patient, reviewing documentation and planning treatment.    Discharge Planning: In progress    I confirmed that the patient's Advance Care Plan is present, code status is documented, or surrogate decision maker is listed in the patient's medical record.      I have utilized all available immediate resources to obtain, update, or review the patient's current medications.      Raj Quinones MD   10/06/21   13:44 CDT

## 2021-10-06 NOTE — PLAN OF CARE
Goal Outcome Evaluation:  Plan of Care Reviewed With: patient        Progress: improving  Outcome Summary: I got the pt restarted on her home clonidine, and lisinopril. HR is now in the low teens, and BP is normon tensive. Pt is intermittently confused. Her gap is still open. Will continue to monitor.

## 2021-10-07 LAB
ANION GAP SERPL CALCULATED.3IONS-SCNC: 17 MMOL/L (ref 5–15)
ANION GAP SERPL CALCULATED.3IONS-SCNC: 18 MMOL/L (ref 5–15)
ANION GAP SERPL CALCULATED.3IONS-SCNC: 19 MMOL/L (ref 5–15)
ANION GAP SERPL CALCULATED.3IONS-SCNC: 22 MMOL/L (ref 5–15)
BASOPHILS # BLD AUTO: 0.03 10*3/MM3 (ref 0–0.2)
BASOPHILS NFR BLD AUTO: 0.2 % (ref 0–1.5)
BUN SERPL-MCNC: 5 MG/DL (ref 6–20)
BUN SERPL-MCNC: 5 MG/DL (ref 6–20)
BUN SERPL-MCNC: 6 MG/DL (ref 6–20)
BUN/CREAT SERPL: 6.7 (ref 7–25)
BUN/CREAT SERPL: 7.4 (ref 7–25)
BUN/CREAT SERPL: 7.7 (ref 7–25)
BUN/CREAT SERPL: 7.9 (ref 7–25)
BUN/CREAT SERPL: 8 (ref 7–25)
BUN/CREAT SERPL: 8.6 (ref 7–25)
CALCIUM SPEC-SCNC: 9.3 MG/DL (ref 8.6–10.5)
CALCIUM SPEC-SCNC: 9.4 MG/DL (ref 8.6–10.5)
CALCIUM SPEC-SCNC: 9.7 MG/DL (ref 8.6–10.5)
CALCIUM SPEC-SCNC: 9.8 MG/DL (ref 8.6–10.5)
CHLORIDE SERPL-SCNC: 117 MMOL/L (ref 98–107)
CHLORIDE SERPL-SCNC: 120 MMOL/L (ref 98–107)
CHLORIDE SERPL-SCNC: 122 MMOL/L (ref 98–107)
CHLORIDE SERPL-SCNC: 123 MMOL/L (ref 98–107)
CHLORIDE SERPL-SCNC: 125 MMOL/L (ref 98–107)
CHLORIDE SERPL-SCNC: 128 MMOL/L (ref 98–107)
CO2 SERPL-SCNC: 12 MMOL/L (ref 22–29)
CO2 SERPL-SCNC: 16 MMOL/L (ref 22–29)
CO2 SERPL-SCNC: 16 MMOL/L (ref 22–29)
CO2 SERPL-SCNC: 18 MMOL/L (ref 22–29)
CO2 SERPL-SCNC: 18 MMOL/L (ref 22–29)
CO2 SERPL-SCNC: 19 MMOL/L (ref 22–29)
CREAT SERPL-MCNC: 0.65 MG/DL (ref 0.57–1)
CREAT SERPL-MCNC: 0.7 MG/DL (ref 0.57–1)
CREAT SERPL-MCNC: 0.75 MG/DL (ref 0.57–1)
CREAT SERPL-MCNC: 0.75 MG/DL (ref 0.57–1)
CREAT SERPL-MCNC: 0.76 MG/DL (ref 0.57–1)
CREAT SERPL-MCNC: 0.81 MG/DL (ref 0.57–1)
CREAT UR-MCNC: 43.3 MG/DL
DEPRECATED RDW RBC AUTO: 46 FL (ref 37–54)
EOSINOPHIL # BLD AUTO: 0 10*3/MM3 (ref 0–0.4)
EOSINOPHIL NFR BLD AUTO: 0 % (ref 0.3–6.2)
ERYTHROCYTE [DISTWIDTH] IN BLOOD BY AUTOMATED COUNT: 14.9 % (ref 12.3–15.4)
GFR SERPL CREATININE-BSD FRML MDRD: 100 ML/MIN/1.73
GFR SERPL CREATININE-BSD FRML MDRD: 109 ML/MIN/1.73
GFR SERPL CREATININE-BSD FRML MDRD: 84 ML/MIN/1.73
GFR SERPL CREATININE-BSD FRML MDRD: 91 ML/MIN/1.73
GFR SERPL CREATININE-BSD FRML MDRD: 92 ML/MIN/1.73
GFR SERPL CREATININE-BSD FRML MDRD: 92 ML/MIN/1.73
GLUCOSE BLDC GLUCOMTR-MCNC: 139 MG/DL (ref 70–130)
GLUCOSE BLDC GLUCOMTR-MCNC: 141 MG/DL (ref 70–130)
GLUCOSE BLDC GLUCOMTR-MCNC: 143 MG/DL (ref 70–130)
GLUCOSE BLDC GLUCOMTR-MCNC: 146 MG/DL (ref 70–130)
GLUCOSE BLDC GLUCOMTR-MCNC: 157 MG/DL (ref 70–130)
GLUCOSE BLDC GLUCOMTR-MCNC: 169 MG/DL (ref 70–130)
GLUCOSE BLDC GLUCOMTR-MCNC: 171 MG/DL (ref 70–130)
GLUCOSE BLDC GLUCOMTR-MCNC: 179 MG/DL (ref 70–130)
GLUCOSE BLDC GLUCOMTR-MCNC: 183 MG/DL (ref 70–130)
GLUCOSE BLDC GLUCOMTR-MCNC: 186 MG/DL (ref 70–130)
GLUCOSE BLDC GLUCOMTR-MCNC: 190 MG/DL (ref 70–130)
GLUCOSE BLDC GLUCOMTR-MCNC: 201 MG/DL (ref 70–130)
GLUCOSE BLDC GLUCOMTR-MCNC: 202 MG/DL (ref 70–130)
GLUCOSE BLDC GLUCOMTR-MCNC: 204 MG/DL (ref 70–130)
GLUCOSE BLDC GLUCOMTR-MCNC: 204 MG/DL (ref 70–130)
GLUCOSE BLDC GLUCOMTR-MCNC: 205 MG/DL (ref 70–130)
GLUCOSE BLDC GLUCOMTR-MCNC: 210 MG/DL (ref 70–130)
GLUCOSE BLDC GLUCOMTR-MCNC: 211 MG/DL (ref 70–130)
GLUCOSE BLDC GLUCOMTR-MCNC: 215 MG/DL (ref 70–130)
GLUCOSE BLDC GLUCOMTR-MCNC: 219 MG/DL (ref 70–130)
GLUCOSE BLDC GLUCOMTR-MCNC: 223 MG/DL (ref 70–130)
GLUCOSE BLDC GLUCOMTR-MCNC: 225 MG/DL (ref 70–130)
GLUCOSE BLDC GLUCOMTR-MCNC: 235 MG/DL (ref 70–130)
GLUCOSE SERPL-MCNC: 167 MG/DL (ref 65–99)
GLUCOSE SERPL-MCNC: 202 MG/DL (ref 65–99)
GLUCOSE SERPL-MCNC: 221 MG/DL (ref 65–99)
GLUCOSE SERPL-MCNC: 223 MG/DL (ref 65–99)
GLUCOSE SERPL-MCNC: 227 MG/DL (ref 65–99)
GLUCOSE SERPL-MCNC: 247 MG/DL (ref 65–99)
HCT VFR BLD AUTO: 42 % (ref 34–46.6)
HGB BLD-MCNC: 14.9 G/DL (ref 12–15.9)
IMM GRANULOCYTES # BLD AUTO: 0.08 10*3/MM3 (ref 0–0.05)
IMM GRANULOCYTES NFR BLD AUTO: 0.6 % (ref 0–0.5)
LYMPHOCYTES # BLD AUTO: 1.66 10*3/MM3 (ref 0.7–3.1)
LYMPHOCYTES NFR BLD AUTO: 13.1 % (ref 19.6–45.3)
MAGNESIUM SERPL-MCNC: 1.8 MG/DL (ref 1.6–2.6)
MAGNESIUM SERPL-MCNC: 2 MG/DL (ref 1.6–2.6)
MAGNESIUM SERPL-MCNC: 2.2 MG/DL (ref 1.6–2.6)
MAGNESIUM SERPL-MCNC: 2.2 MG/DL (ref 1.6–2.6)
MAGNESIUM SERPL-MCNC: 2.3 MG/DL (ref 1.6–2.6)
MAGNESIUM SERPL-MCNC: 2.3 MG/DL (ref 1.6–2.6)
MCH RBC QN AUTO: 30.9 PG (ref 26.6–33)
MCHC RBC AUTO-ENTMCNC: 35.5 G/DL (ref 31.5–35.7)
MCV RBC AUTO: 87.1 FL (ref 79–97)
MONOCYTES # BLD AUTO: 1.27 10*3/MM3 (ref 0.1–0.9)
MONOCYTES NFR BLD AUTO: 10 % (ref 5–12)
NEUTROPHILS NFR BLD AUTO: 76.1 % (ref 42.7–76)
NEUTROPHILS NFR BLD AUTO: 9.65 10*3/MM3 (ref 1.7–7)
NRBC BLD AUTO-RTO: 0 /100 WBC (ref 0–0.2)
PHOSPHATE SERPL-MCNC: 2.2 MG/DL (ref 2.5–4.5)
PHOSPHATE SERPL-MCNC: 2.2 MG/DL (ref 2.5–4.5)
PHOSPHATE SERPL-MCNC: 2.4 MG/DL (ref 2.5–4.5)
PHOSPHATE SERPL-MCNC: 2.5 MG/DL (ref 2.5–4.5)
PHOSPHATE SERPL-MCNC: 2.5 MG/DL (ref 2.5–4.5)
PHOSPHATE SERPL-MCNC: 2.9 MG/DL (ref 2.5–4.5)
PLATELET # BLD AUTO: 180 10*3/MM3 (ref 140–450)
PMV BLD AUTO: 11.7 FL (ref 6–12)
POTASSIUM SERPL-SCNC: 3.5 MMOL/L (ref 3.5–5.2)
POTASSIUM SERPL-SCNC: 3.5 MMOL/L (ref 3.5–5.2)
POTASSIUM SERPL-SCNC: 3.6 MMOL/L (ref 3.5–5.2)
POTASSIUM SERPL-SCNC: 3.6 MMOL/L (ref 3.5–5.2)
POTASSIUM SERPL-SCNC: 4 MMOL/L (ref 3.5–5.2)
POTASSIUM SERPL-SCNC: 4.1 MMOL/L (ref 3.5–5.2)
RBC # BLD AUTO: 4.82 10*6/MM3 (ref 3.77–5.28)
SODIUM SERPL-SCNC: 153 MMOL/L (ref 136–145)
SODIUM SERPL-SCNC: 155 MMOL/L (ref 136–145)
SODIUM SERPL-SCNC: 157 MMOL/L (ref 136–145)
SODIUM SERPL-SCNC: 158 MMOL/L (ref 136–145)
SODIUM SERPL-SCNC: 159 MMOL/L (ref 136–145)
SODIUM SERPL-SCNC: 162 MMOL/L (ref 136–145)
T4 FREE SERPL-MCNC: 0.95 NG/DL (ref 0.93–1.7)
TSH SERPL DL<=0.05 MIU/L-ACNC: 0.14 UIU/ML (ref 0.27–4.2)
UUN 24H UR-MCNC: 317 MG/DL
WBC # BLD AUTO: 12.69 10*3/MM3 (ref 3.4–10.8)

## 2021-10-07 PROCEDURE — 82962 GLUCOSE BLOOD TEST: CPT

## 2021-10-07 PROCEDURE — 63710000001 INSULIN DETEMIR PER 5 UNITS: Performed by: INTERNAL MEDICINE

## 2021-10-07 PROCEDURE — 99254 IP/OBS CNSLTJ NEW/EST MOD 60: CPT | Performed by: INTERNAL MEDICINE

## 2021-10-07 PROCEDURE — 80048 BASIC METABOLIC PNL TOTAL CA: CPT | Performed by: INTERNAL MEDICINE

## 2021-10-07 PROCEDURE — 83735 ASSAY OF MAGNESIUM: CPT | Performed by: INTERNAL MEDICINE

## 2021-10-07 PROCEDURE — 84100 ASSAY OF PHOSPHORUS: CPT | Performed by: INTERNAL MEDICINE

## 2021-10-07 PROCEDURE — 84439 ASSAY OF FREE THYROXINE: CPT | Performed by: INTERNAL MEDICINE

## 2021-10-07 PROCEDURE — 85025 COMPLETE CBC W/AUTO DIFF WBC: CPT | Performed by: INTERNAL MEDICINE

## 2021-10-07 PROCEDURE — 84443 ASSAY THYROID STIM HORMONE: CPT | Performed by: INTERNAL MEDICINE

## 2021-10-07 PROCEDURE — 25010000002 LORAZEPAM PER 2 MG: Performed by: INTERNAL MEDICINE

## 2021-10-07 PROCEDURE — 25010000002 POTASSIUM CHLORIDE PER 2 MEQ OF POTASSIUM: Performed by: INTERNAL MEDICINE

## 2021-10-07 PROCEDURE — 25010000002 CEFTRIAXONE PER 250 MG: Performed by: INTERNAL MEDICINE

## 2021-10-07 PROCEDURE — 25010000002 MORPHINE PER 10 MG: Performed by: INTERNAL MEDICINE

## 2021-10-07 PROCEDURE — 25010000002 ENOXAPARIN PER 10 MG: Performed by: INTERNAL MEDICINE

## 2021-10-07 RX ADMIN — INSULIN DETEMIR 10 UNITS: 100 INJECTION, SOLUTION SUBCUTANEOUS at 20:34

## 2021-10-07 RX ADMIN — MORPHINE SULFATE 2 MG: 2 INJECTION, SOLUTION INTRAMUSCULAR; INTRAVENOUS at 09:47

## 2021-10-07 RX ADMIN — MORPHINE SULFATE 2 MG: 2 INJECTION, SOLUTION INTRAMUSCULAR; INTRAVENOUS at 22:12

## 2021-10-07 RX ADMIN — ENOXAPARIN SODIUM 40 MG: 40 INJECTION SUBCUTANEOUS at 17:06

## 2021-10-07 RX ADMIN — MORPHINE SULFATE 2 MG: 2 INJECTION, SOLUTION INTRAMUSCULAR; INTRAVENOUS at 07:31

## 2021-10-07 RX ADMIN — MORPHINE SULFATE 2 MG: 2 INJECTION, SOLUTION INTRAMUSCULAR; INTRAVENOUS at 01:05

## 2021-10-07 RX ADMIN — POTASSIUM PHOSPHATE, MONOBASIC AND POTASSIUM PHOSPHATE, DIBASIC 15 MMOL: 224; 236 INJECTION, SOLUTION, CONCENTRATE INTRAVENOUS at 17:06

## 2021-10-07 RX ADMIN — LORAZEPAM 1 MG: 2 INJECTION INTRAMUSCULAR; INTRAVENOUS at 07:50

## 2021-10-07 RX ADMIN — POTASSIUM CHLORIDE: 149 INJECTION, SOLUTION, CONCENTRATE INTRAVENOUS at 20:34

## 2021-10-07 RX ADMIN — POTASSIUM CHLORIDE: 149 INJECTION, SOLUTION, CONCENTRATE INTRAVENOUS at 09:46

## 2021-10-07 RX ADMIN — LORAZEPAM 1 MG: 2 INJECTION INTRAMUSCULAR; INTRAVENOUS at 20:09

## 2021-10-07 RX ADMIN — MORPHINE SULFATE 2 MG: 2 INJECTION, SOLUTION INTRAMUSCULAR; INTRAVENOUS at 03:18

## 2021-10-07 RX ADMIN — POTASSIUM CHLORIDE: 149 INJECTION, SOLUTION, CONCENTRATE INTRAVENOUS at 00:16

## 2021-10-07 RX ADMIN — SODIUM CHLORIDE, PRESERVATIVE FREE 10 ML: 5 INJECTION INTRAVENOUS at 22:00

## 2021-10-07 RX ADMIN — SODIUM CHLORIDE, PRESERVATIVE FREE 10 ML: 5 INJECTION INTRAVENOUS at 09:47

## 2021-10-07 RX ADMIN — LORAZEPAM 1 MG: 2 INJECTION INTRAMUSCULAR; INTRAVENOUS at 14:47

## 2021-10-07 RX ADMIN — MORPHINE SULFATE 2 MG: 2 INJECTION, SOLUTION INTRAMUSCULAR; INTRAVENOUS at 18:09

## 2021-10-07 RX ADMIN — INSULIN DETEMIR 10 UNITS: 100 INJECTION, SOLUTION SUBCUTANEOUS at 10:42

## 2021-10-07 RX ADMIN — POTASSIUM CHLORIDE: 149 INJECTION, SOLUTION, CONCENTRATE INTRAVENOUS at 04:58

## 2021-10-07 RX ADMIN — CEFTRIAXONE SODIUM 2 G: 2 INJECTION, POWDER, FOR SOLUTION INTRAMUSCULAR; INTRAVENOUS at 17:06

## 2021-10-07 RX ADMIN — MORPHINE SULFATE 2 MG: 2 INJECTION, SOLUTION INTRAMUSCULAR; INTRAVENOUS at 11:40

## 2021-10-07 NOTE — PLAN OF CARE
Goal Outcome Evaluation:  Plan of Care Reviewed With: caregiver           Outcome Summary: Pt remains NPO on an insulin drip.  Still confused.  Unable to safely take po.  Will monitor,.

## 2021-10-07 NOTE — CONSULTS
CONSULT NOTE     Emili Caal is a 30 y.o. female who I am being consulted for  evaluation of DKA    Referring Provider  Raj Quinones MD    29 yo female w T2DM , poorly controlled, Aic of 13% treated with SGLT2 inhibitor steglatro presented with confusion and abdominal pain to [facility].   Admitting pH was less than 6.9 w evidence of AG acidosis and hyperglcyemia. She received hydration, bicarb, electrolyte replacement , insulin and transferred to [facility].     Patient was admitted on Oct 4, 2021. From admitting labs there was evidence of Gap and Non Gap Metabolic Acidosis highlighted by a Delta Gap/Delta Bicarb more than 1 . Through course of treatment hypernatremia developed despite adequate fluid replacement w hypotonic fluids / K.   Concern was raised given persistent High Gap Acidosis after 48 hours of insulin drip treatment w documented normal lactic acid and adequate renal function.  Patient is lethargic.      Allergies   Allergen Reactions   • Hydroxyquinolines Other (See Comments)     Passed out and was dizzy       Past Medical History:   Diagnosis Date   • Allergic    • Anxiety    • Asthma    • Chronic diarrhea    • Depression    • Diabetes mellitus (HCC)    • High blood pressure    • Hyperlipidemia    • Liver disease    • PTSD (post-traumatic stress disorder)      Family History   Problem Relation Age of Onset   • Liver disease Mother    • Liver disease Father      Social History     Tobacco Use   • Smoking status: Current Every Day Smoker     Packs/day: 0.50     Types: Cigarettes   • Smokeless tobacco: Never Used   Vaping Use   • Vaping Use: Former   Substance Use Topics   • Alcohol use: Never   • Drug use: Not Currently         Current Facility-Administered Medications:   •  acetaminophen (TYLENOL) tablet 650 mg, 650 mg, Oral, Q4H PRN, Raj Quinones MD  •  cefTRIAXone (ROCEPHIN) 2 g/100 mL 0.9% NS IVPB (MBP), 2 g, Intravenous, Q24H, Raj Quinones MD, 2 g at 10/06/21 2311  •   cloNIDine (CATAPRES) tablet 0.1 mg, 0.1 mg, Oral, Daily, Michael Bell Jr., MD, 0.1 mg at 10/05/21 2157  •  dextrose (D50W) 25 g/ 50mL Intravenous Solution 12.5 g, 12.5 g, Intravenous, PRN, Raj Quinones MD  •  dextrose 5 % 1,000 mL with potassium chloride 40 mEq infusion, , Intravenous, Continuous, Raj Quinones MD, Last Rate: 250 mL/hr at 10/07/21 0458, New Bag at 10/07/21 0458  •  dextrose 5 % and sodium chloride 0.45 % infusion, 150 mL/hr, Intravenous, Continuous PRN, Raj Quinones MD  •  dextrose 5 % and sodium chloride 0.45 % with KCl 20 mEq/L infusion, 150 mL/hr, Intravenous, Continuous PRN, Raj Quinones MD, Stopped at 10/05/21 0515  •  dextrose 5 % and sodium chloride 0.45 % with KCl 20 mEq/L infusion, 200 mL/hr, Intravenous, Continuous PRN, Michael Bell Jr., MD, Last Rate: 200 mL/hr at 10/05/21 2253, 200 mL/hr at 10/05/21 2253  •  dextrose 5 % and sodium chloride 0.45 % with KCl 40 mEq/L infusion, 150 mL/hr, Intravenous, Continuous PRN, Raj Quinones MD, Stopped at 10/06/21 1100  •  dextrose 5 % and sodium chloride 0.9 % infusion, 150 mL/hr, Intravenous, Continuous PRN, Raj Quinones MD  •  dextrose 5 % and sodium chloride 0.9 % with KCl 20 mEq/L infusion, 150 mL/hr, Intravenous, Continuous PRN, Raj Quinones MD  •  dextrose 5 % and sodium chloride 0.9 % with KCl 40 mEq/L infusion, 150 mL/hr, Intravenous, Continuous PRN, Raj Quinones MD  •  enoxaparin (LOVENOX) syringe 40 mg, 40 mg, Subcutaneous, Q24H, Raj Quinones MD, 40 mg at 10/06/21 1831  •  insulin detemir (LEVEMIR) injection 10 Units, 10 Units, Subcutaneous, Q12H, Raj Quinones MD, 10 Units at 10/06/21 2002  •  insulin regular (HumuLIN R,NovoLIN R) 100 Units in sodium chloride 0.9 % 100 mL (1 Units/mL) infusion, 10 Units/hr, Intravenous, Titrated, Raj Quinones MD, Last Rate: 2 mL/hr at 10/07/21 0742, 2 Units/hr at 10/07/21 0742  •  lisinopril (PRINIVIL,ZESTRIL) tablet 20  mg, 20 mg, Oral, Q24H, Michael Bell Jr., MD, 20 mg at 10/05/21 2157  •  LORazepam (ATIVAN) injection 1 mg, 1 mg, Intravenous, Q4H PRN, Michael Bell Jr., MD, 1 mg at 10/06/21 2155  •  morphine injection 2 mg, 2 mg, Intravenous, Q2H PRN, 2 mg at 10/07/21 0731 **AND** naloxone (NARCAN) injection 0.4 mg, 0.4 mg, Intravenous, Q5 Min PRN, Raj Quinones MD  •  ondansetron (ZOFRAN) injection 4 mg, 4 mg, Intravenous, Q6H PRN, Raj Quinones MD  •  potassium phosphate 45 mmol in sodium chloride 0.9 % 500 mL infusion, 45 mmol, Intravenous, PRN, Last Rate: 62.5 mL/hr at 10/05/21 1345, 45 mmol at 10/05/21 1345 **OR** potassium phosphate 30 mmol in sodium chloride 0.9 % 250 mL infusion, 30 mmol, Intravenous, PRN, Last Rate: 31.3 mL/hr at 10/06/21 2154, 30 mmol at 10/06/21 2154 **OR** Potassium Phosphates 15 mmol in sodium chloride 0.9 % 100 mL infusion, 15 mmol, Intravenous, PRN **OR** sodium phosphates 45 mmol in sodium chloride 0.9 % 500 mL IVPB, 45 mmol, Intravenous, PRN **OR** sodium phosphates 30 mmol in sodium chloride 0.9 % 250 mL IVPB, 30 mmol, Intravenous, PRN **OR** sodium phosphates 15 mmol in sodium chloride 0.9 % 250 mL IVPB, 15 mmol, Intravenous, PRN, Raj Quinones MD  •  sodium chloride 0.45 % 1,000 mL with potassium chloride 40 mEq infusion, 250 mL/hr, Intravenous, Continuous PRN, Raj Quinones MD  •  sodium chloride 0.45 % infusion, 250 mL/hr, Intravenous, Continuous PRN, Raj Quinones MD  •  sodium chloride 0.45 % with KCl 20 mEq/L infusion, 250 mL/hr, Intravenous, Continuous PRN, Raj Quinones MD, Stopped at 10/04/21 7402  •  sodium chloride 0.9 % flush 10 mL, 10 mL, Intravenous, PRN, Raj Quinones MD  •  sodium chloride 0.9 % flush 10 mL, 10 mL, Intravenous, Once PRN, Raj Quinones MD  •  sodium chloride 0.9 % flush 10 mL, 10 mL, Intravenous, Q12H, Raj Quinones MD, 10 mL at 10/06/21 2027  •  sodium chloride 0.9 % flush 10 mL, 10 mL, Intravenous,  "PRN, Raj Quinones MD  •  sodium chloride 0.9 % infusion, 250 mL/hr, Intravenous, Continuous PRNStacie Sotonte E, MD  •  sodium chloride 0.9 % infusion, 10 mL/hr, Intravenous, Continuous PRStacie MOHR Sotonte E, MD  •  sodium chloride 0.9 % with KCl 20 mEq/L infusion, 250 mL/hr, Intravenous, Continuous PRNStacie Sotonte E, MD  •  sodium chloride 0.9 % with KCl 40 mEq/L infusion, 250 mL/hr, Intravenous, Continuous PRNStacie Sotonte E, MD    No current facility-administered medications on file prior to encounter.     Current Outpatient Medications on File Prior to Encounter   Medication Sig Dispense Refill   • cloNIDine (CATAPRES) 0.1 MG tablet Take 0.1 mg by mouth Daily.     • cyclobenzaprine (FLEXERIL) 10 MG tablet Take 10 mg by mouth Daily.     • dicyclomine (BENTYL) 20 MG tablet Take 20 mg by mouth Every 6 (Six) Hours.     • Ertugliflozin L-PyroglutamicAc (Steglatro) 15 MG tablet Take 15 mg by mouth Every Morning.     • escitalopram (LEXAPRO) 10 MG tablet Take 10 mg by mouth Daily.     • lisinopril (PRINIVIL,ZESTRIL) 20 MG tablet Take 20 mg by mouth Daily.     • simvastatin (ZOCOR) 20 MG tablet Take 20 mg by mouth Every Night.         Medications Discontinued During This Encounter   Medication Reason   • sodium chloride 0.9 % flush 3 mL    • dextrose 5 % and sodium chloride 0.45 % with KCl 20 mEq/L infusion    • insulin detemir (LEVEMIR) injection 10 Units Dose adjustment   • dextrose 5 % with KCl 20 mEq/L infusion Alternate therapy   • dextrose 5 % 1,000 mL with potassium chloride 40 mEq infusion    • dextrose 5 % 1,000 mL with potassium chloride 40 mEq infusion        Review of Systems    Review of Systems   Confused , agitated , on restrains      Objective:   /72 (BP Location: Left arm, Patient Position: Lying)   Pulse 95   Temp 97.2 °F (36.2 °C) (Axillary)   Resp 26   Ht 167.6 cm (66\")   Wt 96.5 kg (212 lb 11.9 oz)   SpO2 97%   BMI 34.34 kg/m²     Physical Exam  Constitutional:  "      Comments: Awake, confused   HENT:      Head: Normocephalic.      Nose: Nose normal.   Eyes:      General: No scleral icterus.        Right eye: No discharge.         Left eye: No discharge.      Extraocular Movements: Extraocular movements intact.      Conjunctiva/sclera: Conjunctivae normal.   Neck:      Vascular: No carotid bruit.   Cardiovascular:      Rate and Rhythm: Regular rhythm. Tachycardia present.      Pulses: Normal pulses.      Heart sounds: Normal heart sounds.   Pulmonary:      Effort: Pulmonary effort is normal.      Breath sounds: Normal breath sounds.   Abdominal:      General: There is no distension.      Palpations: Abdomen is soft.      Tenderness: There is abdominal tenderness. There is no rebound.   Musculoskeletal:         General: Normal range of motion.      Cervical back: No rigidity or tenderness.      Right lower leg: No edema.      Left lower leg: No edema.   Lymphadenopathy:      Cervical: No cervical adenopathy.   Skin:     Coloration: Skin is not jaundiced or pale.      Findings: No bruising.   Neurological:      Mental Status: She is disoriented.         Lab Review    Lab Results   Component Value Date    HGBA1C 13.10 (H) 10/04/2021       Lab Results   Component Value Date    GLUCOSE 247 (H) 10/07/2021    CALCIUM 9.7 10/07/2021     (H) 10/07/2021    K 3.6 10/07/2021    CO2 16.0 (L) 10/07/2021     (H) 10/07/2021    BUN 6 10/07/2021    CREATININE 0.81 10/07/2021    EGFRIFNONA 84 10/07/2021    BCR 7.4 10/07/2021    ANIONGAP 18.0 (H) 10/07/2021     Lab Results   Component Value Date    GLUCOSE 247 (H) 10/07/2021    BUN 6 10/07/2021    CREATININE 0.81 10/07/2021    EGFRIFNONA 84 10/07/2021    BCR 7.4 10/07/2021    CO2 16.0 (L) 10/07/2021    CALCIUM 9.7 10/07/2021    ALBUMIN 4.80 10/04/2021    AST 22 10/04/2021    ALT 20 10/04/2021       Lab Results   Component Value Date    WBC 12.69 (H) 10/07/2021    HGB 14.9 10/07/2021    HCT 42.0 10/07/2021    MCV 87.1 10/07/2021      10/07/2021       Lab Results   Component Value Date    TSH 0.135 (L) 10/07/2021           Assessment/Plan     Elevated Gap Acidosis from DKA in presumed T2DM with the use of SGLT2i  Non Gap Acidosis from Hyperchloremia from volume expansion , loss of ketoanions before bicarb could be generated and possible D Lactic Acidosis  If D lactic acidosis is present ( from conversion of ketones to D Lactate ) then it would contribute to both elevated and normal gap acidosis        Delta / Delta , less than 1 implying normal AG acidosis superimposed on DKA     Urine Gap is  low , this would suggest adequate ammonia elimination     Patient developed hypernatremia but present D5W plus K covering adequately water deficit of about 6-7  liter   Insulin Drip addressing both DKA and possible D Lactate    Her urine output is optimal and intially it must have resulted in loss of ketoanions and probably D Lactate and that is why Delta/ Delta was less than 1.   As she has metabolically improved AG has decreased , CO2 has increased and Delta / Delta is rising.     I was contemplating concomitant low rate bicarb drip to address the non elevated Gap component but she is metabolically improving and her low Urine Anion Gap implies she is able to eliminate acid in the form of ammonia     Keep insulin drip until Gap Closes and I will aid in transitioning to basal , bolus.    Lab assessment to accurately categorize her as either type 1 or type 2 Diabetes.     Mild abnormality in Thyroid Tests imply concomitant transient central hypothyroidism that should resolve       I anticipate lethargy will improve once sodium and acidosis normalize.           I reviewed and summarized records from this admission and I reviewed / ordered labs.       Please see my above opinion and suggestions.         This document has been electronically signed by Christopher Holliday MD on October 7, 2021 07:48 CDT

## 2021-10-07 NOTE — PROGRESS NOTES
[facility]  INPATIENT PROGRESS NOTE    Length of Stay: 3  Date of Admission: 10/4/2021  Primary Care Physician: Bj Duckwotrh MD    Subjective     Chief Complaint: Dysuria, abdominal pain, confusion    HPI: Patient continues to have confusion and lethargy. Anion gap is closing. Hypernatremia is also improving.  Serum Bicarbonate is up to 16 this morning.  Tachycardia has resolved.    Review of Systems   Unable to perform ROS: Mental status change     Objective    Temp:  [97.2 °F (36.2 °C)-98.9 °F (37.2 °C)] 97.8 °F (36.6 °C)  Heart Rate:  [] 89  Resp:  [17-28] 23  BP: (126-168)/() 153/94    Physical Exam  Constitutional:       General: She is not in acute distress.     Appearance: She is ill-appearing. She is not diaphoretic.      Comments: Drowsy, lethargic.   HENT:      Head: Normocephalic and atraumatic.      Right Ear: External ear normal.      Left Ear: External ear normal.      Nose: No congestion or rhinorrhea.      Mouth/Throat:      Mouth: Mucous membranes are dry.      Pharynx: No oropharyngeal exudate or posterior oropharyngeal erythema.   Eyes:      General: No scleral icterus.     Extraocular Movements: Extraocular movements intact.      Conjunctiva/sclera: Conjunctivae normal.   Cardiovascular:      Rate and Rhythm: Normal rate and regular rhythm.      Heart sounds: Normal heart sounds. No murmur heard.     Pulmonary:      Effort: Pulmonary effort is normal. No respiratory distress.      Breath sounds: Normal breath sounds. No wheezing, rhonchi or rales.   Abdominal:      General: Abdomen is flat. There is no distension.      Palpations: Abdomen is soft.      Tenderness: There is abdominal tenderness. There is no guarding.      Comments: Vague abdominal tenderness noted.   Musculoskeletal:         General: No swelling, tenderness or deformity.      Cervical back: Neck supple. No rigidity. No muscular tenderness.      Right  lower leg: No edema.      Left lower leg: No edema.   Lymphadenopathy:      Cervical: No cervical adenopathy.   Skin:     General: Skin is warm and dry.   Neurological:      General: No focal deficit present.      Cranial Nerves: No cranial nerve deficit.      Motor: No weakness.      Comments: Confused.   Psychiatric:      Comments: Confused.       Medication Review:    Current Facility-Administered Medications:   •  acetaminophen (TYLENOL) tablet 650 mg, 650 mg, Oral, Q4H PRN, Raj Quinones MD  •  cefTRIAXone (ROCEPHIN) 2 g/100 mL 0.9% NS IVPB (MBP), 2 g, Intravenous, Q24H, Raj Quinones MD, 2 g at 10/06/21 1831  •  cloNIDine (CATAPRES) tablet 0.1 mg, 0.1 mg, Oral, Daily, Michael Bell Jr., MD, 0.1 mg at 10/05/21 2157  •  dextrose (D50W) 25 g/ 50mL Intravenous Solution 12.5 g, 12.5 g, Intravenous, PRN, Raj Quinones MD  •  dextrose 5 % 1,000 mL with potassium chloride 40 mEq infusion, , Intravenous, Continuous, Raj Quinones MD, Last Rate: 250 mL/hr at 10/07/21 0946, New Bag at 10/07/21 0946  •  dextrose 5 % and sodium chloride 0.45 % infusion, 150 mL/hr, Intravenous, Continuous PRN, Raj Quinones MD  •  dextrose 5 % and sodium chloride 0.45 % with KCl 20 mEq/L infusion, 150 mL/hr, Intravenous, Continuous PRN, Raj Quinones MD, Stopped at 10/05/21 0515  •  dextrose 5 % and sodium chloride 0.45 % with KCl 20 mEq/L infusion, 200 mL/hr, Intravenous, Continuous PRN, Michael Bell Jr., MD, Last Rate: 200 mL/hr at 10/05/21 2253, 200 mL/hr at 10/05/21 2253  •  dextrose 5 % and sodium chloride 0.45 % with KCl 40 mEq/L infusion, 150 mL/hr, Intravenous, Continuous PRN, Raj Quinones MD, Stopped at 10/06/21 1100  •  dextrose 5 % and sodium chloride 0.9 % infusion, 150 mL/hr, Intravenous, Continuous PRN, Raj Quinones MD  •  dextrose 5 % and sodium chloride 0.9 % with KCl 20 mEq/L infusion, 150 mL/hr, Intravenous, Continuous PRN, Raj Quinones MD  •  dextrose 5 %  and sodium chloride 0.9 % with KCl 40 mEq/L infusion, 150 mL/hr, Intravenous, Continuous PRN, Raj Quinones MD  •  enoxaparin (LOVENOX) syringe 40 mg, 40 mg, Subcutaneous, Q24H, Raj Quinones MD, 40 mg at 10/06/21 1831  •  insulin detemir (LEVEMIR) injection 10 Units, 10 Units, Subcutaneous, Q12H, Raj Quinones MD, 10 Units at 10/07/21 1042  •  insulin regular (HumuLIN R,NovoLIN R) 100 Units in sodium chloride 0.9 % 100 mL (1 Units/mL) infusion, 10 Units/hr, Intravenous, Titrated, Raj Quinones MD, Last Rate: 3 mL/hr at 10/07/21 1524, 3 Units/hr at 10/07/21 1524  •  lisinopril (PRINIVIL,ZESTRIL) tablet 20 mg, 20 mg, Oral, Q24H, Michael Bell Jr., MD, 20 mg at 10/05/21 2157  •  LORazepam (ATIVAN) injection 1 mg, 1 mg, Intravenous, Q4H PRN, Michael Bell Jr., MD, 1 mg at 10/07/21 1447  •  morphine injection 2 mg, 2 mg, Intravenous, Q2H PRN, 2 mg at 10/07/21 1140 **AND** naloxone (NARCAN) injection 0.4 mg, 0.4 mg, Intravenous, Q5 Min PRN, Raj Quinones MD  •  ondansetron (ZOFRAN) injection 4 mg, 4 mg, Intravenous, Q6H PRN, Raj Quinones MD  •  potassium phosphate 45 mmol in sodium chloride 0.9 % 500 mL infusion, 45 mmol, Intravenous, PRN, Last Rate: 62.5 mL/hr at 10/05/21 1345, 45 mmol at 10/05/21 1345 **OR** potassium phosphate 30 mmol in sodium chloride 0.9 % 250 mL infusion, 30 mmol, Intravenous, PRN, Last Rate: 31.3 mL/hr at 10/06/21 2154, 30 mmol at 10/06/21 2154 **OR** Potassium Phosphates 15 mmol in sodium chloride 0.9 % 100 mL infusion, 15 mmol, Intravenous, PRN **OR** sodium phosphates 45 mmol in sodium chloride 0.9 % 500 mL IVPB, 45 mmol, Intravenous, PRN **OR** sodium phosphates 30 mmol in sodium chloride 0.9 % 250 mL IVPB, 30 mmol, Intravenous, PRN **OR** sodium phosphates 15 mmol in sodium chloride 0.9 % 250 mL IVPB, 15 mmol, Intravenous, PRN, Raj Quinones MD  •  sodium chloride 0.45 % 1,000 mL with potassium chloride 40 mEq infusion, 250 mL/hr,  Intravenous, Continuous PRNStacie Sotonte E, MD  •  sodium chloride 0.45 % infusion, 250 mL/hr, Intravenous, Continuous PRNStacie Sotonte E, MD  •  sodium chloride 0.45 % with KCl 20 mEq/L infusion, 250 mL/hr, Intravenous, Continuous PRStacie MOHR Sotonte E, MD, Stopped at 10/04/21 2352  •  sodium chloride 0.9 % flush 10 mL, 10 mL, Intravenous, PRNStacie Sotonte E, MD  •  sodium chloride 0.9 % flush 10 mL, 10 mL, Intravenous, Once PRN, Raj Quinones MD  •  sodium chloride 0.9 % flush 10 mL, 10 mL, Intravenous, Q12H, Raj Quinones MD, 10 mL at 10/07/21 0947  •  sodium chloride 0.9 % flush 10 mL, 10 mL, Intravenous, PRNStacie Sotonte E, MD  •  sodium chloride 0.9 % infusion, 250 mL/hr, Intravenous, Continuous Stacie DONALD Sotonte E, MD  •  sodium chloride 0.9 % infusion, 10 mL/hr, Intravenous, Continuous Stacie DONALD Sotonte E, MD  •  sodium chloride 0.9 % with KCl 20 mEq/L infusion, 250 mL/hr, Intravenous, Continuous PRStacie MOHR Sotonte E, MD  •  sodium chloride 0.9 % with KCl 40 mEq/L infusion, 250 mL/hr, Intravenous, Continuous Stacie DONALD Sotonte E, MD    I have reviewed the patient's current medications.     Results Review:  I have reviewed the labs, radiology results, and diagnostic studies.    Laboratory Data:   Results from last 7 days   Lab Units 10/07/21  1535 10/07/21  1212 10/07/21  0906 10/04/21  2359 10/04/21  2007   SODIUM mmol/L 155* 158* 157*   < > 142   POTASSIUM mmol/L 4.1 3.6 3.5   < > 4.8   CHLORIDE mmol/L 120* 123* 122*   < > 113*   CO2 mmol/L 16.0* 18.0* 18.0*   < > 2.0*   BUN mg/dL 5* 6 6   < > 17   CREATININE mg/dL 0.75 0.70 0.76   < > 1.02*   GLUCOSE mg/dL 202* 221* 223*   < > 214*   CALCIUM mg/dL 9.8 9.8 9.8   < > 9.3   BILIRUBIN mg/dL  --   --   --   --  0.2   ALK PHOS U/L  --   --   --   --  155*   ALT (SGPT) U/L  --   --   --   --  20   AST (SGOT) U/L  --   --   --   --  22   ANION GAP mmol/L 19.0* 17.0* 17.0*   < > 27.0*    < > = values in this interval  not displayed.     Estimated Creatinine Clearance: 130.8 mL/min (by C-G formula based on SCr of 0.75 mg/dL).  Results from last 7 days   Lab Units 10/07/21  1535 10/07/21  1212 10/07/21  0906   MAGNESIUM mg/dL 2.0 2.2 2.2   PHOSPHORUS mg/dL 2.2* 2.2* 2.5         Results from last 7 days   Lab Units 10/07/21  0403 10/06/21  0420 10/05/21  0405 10/04/21  1800   WBC 10*3/mm3 12.69* 13.69* 22.13* 30.01*   HEMOGLOBIN g/dL 14.9 14.5 16.5* 17.1*   HEMATOCRIT % 42.0 41.1 48.6* 51.0*   PLATELETS 10*3/mm3 180 194 219 265           Culture Data:   Blood Culture   Date Value Ref Range Status   10/04/2021 No growth at 2 days  Preliminary   10/04/2021 No growth at 2 days  Preliminary     No results found for: URINECX  No results found for: RESPCX  No results found for: WOUNDCX  No results found for: STOOLCX  No components found for: BODYFLD    Radiology Data:   Imaging Results (Last 24 Hours)     ** No results found for the last 24 hours. **          Assessment/Plan     Hospital Problem List:  Principal Problem:    DKA (diabetic ketoacidosis) ([facility])  Active Problems:    Acute UTI (urinary tract infection)    Sepsis, unspecified organism ([facility])  Metabolic acidosis  Hypernatremia     Plan  -Patient has DKA which is likely due to combination of urinary tract infection, sepsis and SGLT2 inhibitor  -She was significantly dehydrated clinically despite IV fluids as per DKA protocol  -Anion gap remains open at this time but is closing.  Hyponatremia is improving  -Continue with D5 with 40 of K at 250 an hour for now due to hypernatremia  -Continue IV insulin as per DKA protocol with aim to correct anion gap.    -Titrate insulin up for hyperglycemia with target blood glucose around 180 mg/DL  -Continue Levemir 10 units every 12 hours  -BMP every 4 hours and monitor for closure of anion gap  -Endocrinology consultation input appreciated.  -Beta hydroxybutyrate is pending.  Patient had normal lactic acid and positive acetone in blood  plasma  -N.p.o. except for sips with meds. Okay for patient to have pure water and ice chips  -Patient remains confused which is due to DKA and metabolic acidosis.  Continue restraints for patient safety.  Will sparingly use Ativan only for extreme agitation.  -Replete electrolytes  -Continue IV antibiotics with ceftriaxone for acute cystitis  -CT of the abdomen and pelvis did not show any pyelonephritis or intra-abdominal infection  -Blood cultures negative  -Continue home antihypertensive medications  -DVT prophylaxis with subcu Lovenox  -CODE STATUS is full code     35 minutes of critical care time was spent evaluating patient, reviewing documentation and planning treatment.    Discharge Planning: In progress    I confirmed that the patient's Advance Care Plan is present, code status is documented, or surrogate decision maker is listed in the patient's medical record.      I have utilized all available immediate resources to obtain, update, or review the patient's current medications.      Raj Quinones MD   10/07/21   16:31 CDT

## 2021-10-07 NOTE — PLAN OF CARE
Goal Outcome Evaluation:  Plan of Care Reviewed With: patient, sibling        Progress: no change  V/S stable, no fever, good urine output, 1 BM today, pt still confused, Ativan and Morphine prn, pt remains on insulin gtt

## 2021-10-07 NOTE — PLAN OF CARE
Goal Outcome Evaluation:              Outcome Summary: Patient vitals have remained stable this shift. Patient has been confused but knows her name and that she is at the hospital. Patient has been very anxious and restless and has been attempting to pull out IVs and huber catheter. PRN anxiety and pain medication have been given. Patient said that her stomach was hurting. PRN pain medications helped pain. Remains on insulin drip with labs checked Q4 hours. Phos replaced this shift. Will continue to monitor and continue with current plan of care.

## 2021-10-07 NOTE — CONSULTS
Adult Nutrition  Assessment    Patient Name:  Emili Caal  YOB: 1990  MRN: [id number]  Admit Date:  10/4/2021    Assessment Date:  10/7/2021    Comments:  Pt remains NPO.  She is still confused and lethargic requiring Ativan and restraints due to agitation.  Glucose improved but anion Gap remains open.  Also hypernatremia.  Continues on insulin drip.  Endocrinologist reports a neurological event.  RD will continue to monitor.  She may need nutrition support if no improvement.      Reason for Assessment     Row Name 10/07/21 1642          Reason for Assessment    Reason For Assessment  follow-up protocol         Nutrition/Diet History     Row Name 10/07/21 1642          Nutrition/Diet History    Typical Food/Fluid Intake  Pt is still confused.  Not able to take po.           Labs/Tests/Procedures/Meds     Row Name 10/07/21 1642          Labs/Procedures/Meds    Lab Results Reviewed  reviewed, pertinent        Diagnostic Tests/Procedures    Diagnostic Test/Procedure Reviewed  reviewed, pertinent        Medications    Pertinent Medications Reviewed  reviewed, pertinent         Physical Findings     Row Name 10/07/21 1643          Physical Findings    Overall Physical Appearance  overweight;on oxygen therapy           Nutrition Prescription Ordered     Row Name 10/07/21 1643          Nutrition Prescription PO    Current PO Diet  NPO                 Electronically signed by:  Janna Harrison RD  10/07/21 16:46 CDT

## 2021-10-08 LAB
ANION GAP SERPL CALCULATED.3IONS-SCNC: 14 MMOL/L (ref 5–15)
B-OH-BUTYR SERPL-MCNC: 66 MG/DL
BASOPHILS # BLD AUTO: 0.03 10*3/MM3 (ref 0–0.2)
BASOPHILS NFR BLD AUTO: 0.3 % (ref 0–1.5)
BUN SERPL-MCNC: 3 MG/DL (ref 6–20)
BUN SERPL-MCNC: 4 MG/DL (ref 6–20)
BUN/CREAT SERPL: 5.8 (ref 7–25)
BUN/CREAT SERPL: 6.7 (ref 7–25)
BUN/CREAT SERPL: 7.1 (ref 7–25)
BUN/CREAT SERPL: 7.3 (ref 7–25)
BUN/CREAT SERPL: 8.7 (ref 7–25)
C PEPTIDE SERPL-MCNC: 1.3 NG/ML (ref 1.1–4.4)
CALCIUM SPEC-SCNC: 8.9 MG/DL (ref 8.6–10.5)
CALCIUM SPEC-SCNC: 9.2 MG/DL (ref 8.6–10.5)
CALCIUM SPEC-SCNC: 9.4 MG/DL (ref 8.6–10.5)
CALCIUM SPEC-SCNC: 9.5 MG/DL (ref 8.6–10.5)
CALCIUM SPEC-SCNC: 9.5 MG/DL (ref 8.6–10.5)
CHLORIDE SERPL-SCNC: 109 MMOL/L (ref 98–107)
CHLORIDE SERPL-SCNC: 109 MMOL/L (ref 98–107)
CHLORIDE SERPL-SCNC: 110 MMOL/L (ref 98–107)
CHLORIDE SERPL-SCNC: 112 MMOL/L (ref 98–107)
CHLORIDE SERPL-SCNC: 115 MMOL/L (ref 98–107)
CO2 SERPL-SCNC: 21 MMOL/L (ref 22–29)
CO2 SERPL-SCNC: 22 MMOL/L (ref 22–29)
CO2 SERPL-SCNC: 23 MMOL/L (ref 22–29)
CREAT SERPL-MCNC: 0.46 MG/DL (ref 0.57–1)
CREAT SERPL-MCNC: 0.52 MG/DL (ref 0.57–1)
CREAT SERPL-MCNC: 0.55 MG/DL (ref 0.57–1)
CREAT SERPL-MCNC: 0.56 MG/DL (ref 0.57–1)
CREAT SERPL-MCNC: 0.6 MG/DL (ref 0.57–1)
DEPRECATED RDW RBC AUTO: 46 FL (ref 37–54)
EOSINOPHIL # BLD AUTO: 0.01 10*3/MM3 (ref 0–0.4)
EOSINOPHIL NFR BLD AUTO: 0.1 % (ref 0.3–6.2)
ERYTHROCYTE [DISTWIDTH] IN BLOOD BY AUTOMATED COUNT: 14.6 % (ref 12.3–15.4)
GFR SERPL CREATININE-BSD FRML MDRD: 119 ML/MIN/1.73
GFR SERPL CREATININE-BSD FRML MDRD: 129 ML/MIN/1.73
GFR SERPL CREATININE-BSD FRML MDRD: 132 ML/MIN/1.73
GFR SERPL CREATININE-BSD FRML MDRD: 140 ML/MIN/1.73
GFR SERPL CREATININE-BSD FRML MDRD: >150 ML/MIN/1.73
GLUCOSE BLDC GLUCOMTR-MCNC: 154 MG/DL (ref 70–130)
GLUCOSE BLDC GLUCOMTR-MCNC: 170 MG/DL (ref 70–130)
GLUCOSE BLDC GLUCOMTR-MCNC: 177 MG/DL (ref 70–130)
GLUCOSE BLDC GLUCOMTR-MCNC: 184 MG/DL (ref 70–130)
GLUCOSE BLDC GLUCOMTR-MCNC: 187 MG/DL (ref 70–130)
GLUCOSE BLDC GLUCOMTR-MCNC: 189 MG/DL (ref 70–130)
GLUCOSE BLDC GLUCOMTR-MCNC: 190 MG/DL (ref 70–130)
GLUCOSE BLDC GLUCOMTR-MCNC: 192 MG/DL (ref 70–130)
GLUCOSE BLDC GLUCOMTR-MCNC: 198 MG/DL (ref 70–130)
GLUCOSE BLDC GLUCOMTR-MCNC: 202 MG/DL (ref 70–130)
GLUCOSE BLDC GLUCOMTR-MCNC: 208 MG/DL (ref 70–130)
GLUCOSE BLDC GLUCOMTR-MCNC: 216 MG/DL (ref 70–130)
GLUCOSE SERPL-MCNC: 181 MG/DL (ref 65–99)
GLUCOSE SERPL-MCNC: 195 MG/DL (ref 65–99)
GLUCOSE SERPL-MCNC: 201 MG/DL (ref 65–99)
GLUCOSE SERPL-MCNC: 205 MG/DL (ref 65–99)
GLUCOSE SERPL-MCNC: 205 MG/DL (ref 65–99)
HCT VFR BLD AUTO: 42.8 % (ref 34–46.6)
HGB BLD-MCNC: 15 G/DL (ref 12–15.9)
IMM GRANULOCYTES # BLD AUTO: 0.03 10*3/MM3 (ref 0–0.05)
IMM GRANULOCYTES NFR BLD AUTO: 0.3 % (ref 0–0.5)
LYMPHOCYTES # BLD AUTO: 2.36 10*3/MM3 (ref 0.7–3.1)
LYMPHOCYTES NFR BLD AUTO: 22.2 % (ref 19.6–45.3)
MAGNESIUM SERPL-MCNC: 1.8 MG/DL (ref 1.6–2.6)
MAGNESIUM SERPL-MCNC: 1.9 MG/DL (ref 1.6–2.6)
MAGNESIUM SERPL-MCNC: 1.9 MG/DL (ref 1.6–2.6)
MCH RBC QN AUTO: 30.7 PG (ref 26.6–33)
MCHC RBC AUTO-ENTMCNC: 35 G/DL (ref 31.5–35.7)
MCV RBC AUTO: 87.7 FL (ref 79–97)
MONOCYTES # BLD AUTO: 0.85 10*3/MM3 (ref 0.1–0.9)
MONOCYTES NFR BLD AUTO: 8 % (ref 5–12)
NEUTROPHILS NFR BLD AUTO: 69.1 % (ref 42.7–76)
NEUTROPHILS NFR BLD AUTO: 7.33 10*3/MM3 (ref 1.7–7)
NRBC BLD AUTO-RTO: 0 /100 WBC (ref 0–0.2)
PHOSPHATE SERPL-MCNC: 2.6 MG/DL (ref 2.5–4.5)
PHOSPHATE SERPL-MCNC: 2.7 MG/DL (ref 2.5–4.5)
PHOSPHATE SERPL-MCNC: 2.8 MG/DL (ref 2.5–4.5)
PHOSPHATE SERPL-MCNC: 3 MG/DL (ref 2.5–4.5)
PHOSPHATE SERPL-MCNC: 3.1 MG/DL (ref 2.5–4.5)
PLATELET # BLD AUTO: 148 10*3/MM3 (ref 140–450)
PMV BLD AUTO: 11.7 FL (ref 6–12)
POTASSIUM SERPL-SCNC: 3.4 MMOL/L (ref 3.5–5.2)
POTASSIUM SERPL-SCNC: 3.5 MMOL/L (ref 3.5–5.2)
POTASSIUM SERPL-SCNC: 3.7 MMOL/L (ref 3.5–5.2)
POTASSIUM SERPL-SCNC: 3.9 MMOL/L (ref 3.5–5.2)
RBC # BLD AUTO: 4.88 10*6/MM3 (ref 3.77–5.28)
SODIUM SERPL-SCNC: 145 MMOL/L (ref 136–145)
SODIUM SERPL-SCNC: 146 MMOL/L (ref 136–145)
SODIUM SERPL-SCNC: 147 MMOL/L (ref 136–145)
SODIUM SERPL-SCNC: 149 MMOL/L (ref 136–145)
SODIUM SERPL-SCNC: 150 MMOL/L (ref 136–145)
WBC # BLD AUTO: 10.61 10*3/MM3 (ref 3.4–10.8)

## 2021-10-08 PROCEDURE — 25010000002 POTASSIUM CHLORIDE PER 2 MEQ OF POTASSIUM: Performed by: INTERNAL MEDICINE

## 2021-10-08 PROCEDURE — 84100 ASSAY OF PHOSPHORUS: CPT | Performed by: INTERNAL MEDICINE

## 2021-10-08 PROCEDURE — 25010000002 CEFTRIAXONE PER 250 MG: Performed by: INTERNAL MEDICINE

## 2021-10-08 PROCEDURE — 25010000002 ENOXAPARIN PER 10 MG: Performed by: INTERNAL MEDICINE

## 2021-10-08 PROCEDURE — 83735 ASSAY OF MAGNESIUM: CPT | Performed by: INTERNAL MEDICINE

## 2021-10-08 PROCEDURE — 25010000003 POTASSIUM CHLORIDE 10 MEQ/100ML SOLUTION: Performed by: INTERNAL MEDICINE

## 2021-10-08 PROCEDURE — 80048 BASIC METABOLIC PNL TOTAL CA: CPT | Performed by: INTERNAL MEDICINE

## 2021-10-08 PROCEDURE — 63710000001 INSULIN DETEMIR PER 5 UNITS: Performed by: INTERNAL MEDICINE

## 2021-10-08 PROCEDURE — 82962 GLUCOSE BLOOD TEST: CPT

## 2021-10-08 PROCEDURE — 63710000001 INSULIN ASPART PER 5 UNITS: Performed by: INTERNAL MEDICINE

## 2021-10-08 PROCEDURE — 84132 ASSAY OF SERUM POTASSIUM: CPT | Performed by: INTERNAL MEDICINE

## 2021-10-08 PROCEDURE — 85025 COMPLETE CBC W/AUTO DIFF WBC: CPT | Performed by: INTERNAL MEDICINE

## 2021-10-08 RX ORDER — MAGNESIUM SULFATE HEPTAHYDRATE 40 MG/ML
2 INJECTION, SOLUTION INTRAVENOUS AS NEEDED
Status: DISCONTINUED | OUTPATIENT
Start: 2021-10-08 | End: 2021-10-11 | Stop reason: HOSPADM

## 2021-10-08 RX ORDER — DEXTROSE, SODIUM CHLORIDE, AND POTASSIUM CHLORIDE 5; .45; .075 G/100ML; G/100ML; G/100ML
150 INJECTION INTRAVENOUS CONTINUOUS
Status: DISCONTINUED | OUTPATIENT
Start: 2021-10-08 | End: 2021-10-09

## 2021-10-08 RX ORDER — POTASSIUM CHLORIDE 7.45 MG/ML
10 INJECTION INTRAVENOUS
Status: DISCONTINUED | OUTPATIENT
Start: 2021-10-08 | End: 2021-10-11 | Stop reason: HOSPADM

## 2021-10-08 RX ORDER — MAGNESIUM SULFATE HEPTAHYDRATE 40 MG/ML
4 INJECTION, SOLUTION INTRAVENOUS AS NEEDED
Status: DISCONTINUED | OUTPATIENT
Start: 2021-10-08 | End: 2021-10-11 | Stop reason: HOSPADM

## 2021-10-08 RX ORDER — POTASSIUM CHLORIDE 750 MG/1
40 CAPSULE, EXTENDED RELEASE ORAL AS NEEDED
Status: DISCONTINUED | OUTPATIENT
Start: 2021-10-08 | End: 2021-10-11 | Stop reason: HOSPADM

## 2021-10-08 RX ORDER — POTASSIUM CHLORIDE 1.5 G/1.77G
40 POWDER, FOR SOLUTION ORAL AS NEEDED
Status: DISCONTINUED | OUTPATIENT
Start: 2021-10-08 | End: 2021-10-11 | Stop reason: HOSPADM

## 2021-10-08 RX ADMIN — POTASSIUM CHLORIDE: 149 INJECTION, SOLUTION, CONCENTRATE INTRAVENOUS at 05:45

## 2021-10-08 RX ADMIN — POTASSIUM CHLORIDE, DEXTROSE MONOHYDRATE AND SODIUM CHLORIDE 150 ML/HR: 75; 5; 450 INJECTION, SOLUTION INTRAVENOUS at 22:00

## 2021-10-08 RX ADMIN — CEFTRIAXONE SODIUM 2 G: 2 INJECTION, POWDER, FOR SOLUTION INTRAMUSCULAR; INTRAVENOUS at 17:39

## 2021-10-08 RX ADMIN — LISINOPRIL 20 MG: 20 TABLET ORAL at 08:19

## 2021-10-08 RX ADMIN — ENOXAPARIN SODIUM 40 MG: 40 INJECTION SUBCUTANEOUS at 17:40

## 2021-10-08 RX ADMIN — POTASSIUM CHLORIDE 10 MEQ: 7.46 INJECTION, SOLUTION INTRAVENOUS at 16:30

## 2021-10-08 RX ADMIN — POTASSIUM CHLORIDE, DEXTROSE MONOHYDRATE AND SODIUM CHLORIDE 150 ML/HR: 75; 5; 450 INJECTION, SOLUTION INTRAVENOUS at 14:22

## 2021-10-08 RX ADMIN — INSULIN DETEMIR 10 UNITS: 100 INJECTION, SOLUTION SUBCUTANEOUS at 08:19

## 2021-10-08 RX ADMIN — SODIUM CHLORIDE, PRESERVATIVE FREE 10 ML: 5 INJECTION INTRAVENOUS at 08:19

## 2021-10-08 RX ADMIN — POTASSIUM CHLORIDE 10 MEQ: 7.46 INJECTION, SOLUTION INTRAVENOUS at 14:19

## 2021-10-08 RX ADMIN — INSULIN ASPART 2 UNITS: 100 INJECTION, SOLUTION INTRAVENOUS; SUBCUTANEOUS at 17:59

## 2021-10-08 RX ADMIN — SODIUM CHLORIDE, PRESERVATIVE FREE 10 ML: 5 INJECTION INTRAVENOUS at 20:42

## 2021-10-08 RX ADMIN — POTASSIUM CHLORIDE 10 MEQ: 7.46 INJECTION, SOLUTION INTRAVENOUS at 15:23

## 2021-10-08 RX ADMIN — POTASSIUM CHLORIDE: 149 INJECTION, SOLUTION, CONCENTRATE INTRAVENOUS at 10:43

## 2021-10-08 RX ADMIN — INSULIN ASPART 2 UNITS: 100 INJECTION, SOLUTION INTRAVENOUS; SUBCUTANEOUS at 20:35

## 2021-10-08 RX ADMIN — INSULIN ASPART 2 UNITS: 100 INJECTION, SOLUTION INTRAVENOUS; SUBCUTANEOUS at 18:37

## 2021-10-08 RX ADMIN — INSULIN DETEMIR 5 UNITS: 100 INJECTION, SOLUTION SUBCUTANEOUS at 14:23

## 2021-10-08 RX ADMIN — POTASSIUM CHLORIDE 10 MEQ: 7.46 INJECTION, SOLUTION INTRAVENOUS at 17:38

## 2021-10-08 RX ADMIN — CLONIDINE HYDROCHLORIDE 0.1 MG: 0.1 TABLET ORAL at 08:19

## 2021-10-08 RX ADMIN — INSULIN DETEMIR 15 UNITS: 100 INJECTION, SOLUTION SUBCUTANEOUS at 20:38

## 2021-10-08 RX ADMIN — POTASSIUM CHLORIDE: 149 INJECTION, SOLUTION, CONCENTRATE INTRAVENOUS at 00:52

## 2021-10-08 NOTE — PROGRESS NOTES
[facility] ER Medicine Services  INPATIENT PROGRESS NOTE    Length of Stay: 4  Date of Admission: 10/4/2021  Primary Care Physician: Bj Duckworth MD    Subjective     Chief Complaint: Dysuria, abdominal pain, confusion    HPI: Patient anion gap and sodium bicarbonate have normalized.  Her confusion is improving and patient was able to drink fluids today.  She remains lethargic.    Review of Systems   Unable to perform ROS: Mental status change     Objective    Temp:  [97.5 °F (36.4 °C)-98.6 °F (37 °C)] 98.6 °F (37 °C)  Heart Rate:  [] 79  Resp:  [22-26] 22  BP: (127-172)/() 132/74    Physical Exam  Constitutional:       General: She is not in acute distress.     Appearance: She is not ill-appearing or diaphoretic.      Comments: Drowsy, lethargic.   HENT:      Head: Normocephalic and atraumatic.      Right Ear: External ear normal.      Left Ear: External ear normal.      Nose: No congestion or rhinorrhea.      Mouth/Throat:      Mouth: Mucous membranes are dry.      Pharynx: No oropharyngeal exudate or posterior oropharyngeal erythema.   Eyes:      General: No scleral icterus.     Extraocular Movements: Extraocular movements intact.      Conjunctiva/sclera: Conjunctivae normal.   Cardiovascular:      Rate and Rhythm: Normal rate and regular rhythm.      Heart sounds: Normal heart sounds. No murmur heard.     Pulmonary:      Effort: Pulmonary effort is normal. No respiratory distress.      Breath sounds: Normal breath sounds. No wheezing, rhonchi or rales.   Abdominal:      General: Abdomen is flat. There is no distension.      Palpations: Abdomen is soft.      Tenderness: There is no abdominal tenderness. There is no guarding.   Musculoskeletal:         General: No swelling, tenderness or deformity.      Cervical back: Neck supple. No rigidity. No muscular tenderness.      Right lower leg: No edema.      Left lower leg: No edema.   Lymphadenopathy:       Cervical: No cervical adenopathy.   Skin:     General: Skin is warm and dry.   Neurological:      General: No focal deficit present.      Cranial Nerves: No cranial nerve deficit.      Motor: No weakness.      Comments: Confused.  Lethargic   Psychiatric:      Comments: Confused.  Lethargic       Medication Review:    Current Facility-Administered Medications:   •  acetaminophen (TYLENOL) tablet 650 mg, 650 mg, Oral, Q4H PRN, Raj Quinones MD  •  cefTRIAXone (ROCEPHIN) 2 g/100 mL 0.9% NS IVPB (MBP), 2 g, Intravenous, Q24H, Raj Quinones MD, 2 g at 10/07/21 1706  •  cloNIDine (CATAPRES) tablet 0.1 mg, 0.1 mg, Oral, Daily, Michael Bell Jr., MD, 0.1 mg at 10/08/21 0819  •  dextrose 5 % and sodium chloride 0.45 % with KCl 10 mEq/L infusion, 150 mL/hr, Intravenous, Continuous, Raj Quinones MD, Last Rate: 150 mL/hr at 10/08/21 1422, 150 mL/hr at 10/08/21 1422  •  enoxaparin (LOVENOX) syringe 40 mg, 40 mg, Subcutaneous, Q24H, Raj Quinones MD, 40 mg at 10/07/21 1706  •  insulin aspart (novoLOG) injection 2-10 Units, 2-10 Units, Subcutaneous, TID With Meals, Christopher Cam MD  •  insulin aspart (novoLOG) injection 2-8 Units, 2-8 Units, Subcutaneous, 4x Daily, Christopher Cam MD  •  [START ON 10/9/2021] insulin aspart (novoLOG) injection 2-8 Units, 2-8 Units, Subcutaneous, Q24H, Christopher Cam MD  •  insulin detemir (LEVEMIR) injection 15 Units, 15 Units, Subcutaneous, Q12H, Christopher Cam MD  •  lisinopril (PRINIVIL,ZESTRIL) tablet 20 mg, 20 mg, Oral, Q24H, Michael Bell Jr., MD, 20 mg at 10/08/21 0819  •  LORazepam (ATIVAN) injection 1 mg, 1 mg, Intravenous, Q4H PRN, Michael Bell Jr., MD, 1 mg at 10/07/21 2009  •  Magnesium Sulfate 2 gram Bolus, followed by 8 gram infusion (total Mg dose 10 grams)- Mg less than or equal to 1mg/dL, 2 g, Intravenous, PRN **OR** Magnesium Sulfate 2 gram / 50mL Infusion (GIVE X 3 BAGS TO EQUAL 6GM TOTAL DOSE) -  Mg 1.1 - 1.5 mg/dl, 2 g, Intravenous, PRN **OR** Magnesium Sulfate 4 gram infusion- Mg 1.6-1.9 mg/dL, 4 g, Intravenous, PRN, Raj Quinones MD  •  morphine injection 2 mg, 2 mg, Intravenous, Q2H PRN, 2 mg at 10/07/21 2212 **AND** naloxone (NARCAN) injection 0.4 mg, 0.4 mg, Intravenous, Q5 Min PRN, Raj Quinones MD  •  ondansetron (ZOFRAN) injection 4 mg, 4 mg, Intravenous, Q6H PRN, Raj Quinones MD  •  potassium chloride (MICRO-K) CR capsule 40 mEq, 40 mEq, Oral, PRN **OR** potassium chloride (KLOR-CON) packet 40 mEq, 40 mEq, Oral, PRN **OR** potassium chloride 10 mEq in 100 mL IVPB, 10 mEq, Intravenous, Q1H PRN, Raj Quinones MD, Last Rate: 100 mL/hr at 10/08/21 1419, 10 mEq at 10/08/21 1419  •  potassium phosphate 45 mmol in sodium chloride 0.9 % 500 mL infusion, 45 mmol, Intravenous, PRN, Last Rate: 62.5 mL/hr at 10/05/21 1345, 45 mmol at 10/05/21 1345 **OR** potassium phosphate 30 mmol in sodium chloride 0.9 % 250 mL infusion, 30 mmol, Intravenous, PRN, Last Rate: 31.3 mL/hr at 10/06/21 2154, 30 mmol at 10/06/21 2154 **OR** Potassium Phosphates 15 mmol in sodium chloride 0.9 % 100 mL infusion, 15 mmol, Intravenous, PRN, 15 mmol at 10/07/21 1706 **OR** sodium phosphates 45 mmol in sodium chloride 0.9 % 500 mL IVPB, 45 mmol, Intravenous, PRN **OR** sodium phosphates 30 mmol in sodium chloride 0.9 % 250 mL IVPB, 30 mmol, Intravenous, PRN **OR** sodium phosphates 15 mmol in sodium chloride 0.9 % 250 mL IVPB, 15 mmol, Intravenous, PRN, Raj Quinones MD  •  sodium chloride 0.45 % with KCl 20 mEq/L infusion, 250 mL/hr, Intravenous, Continuous PRN, Raj Quinones MD, Stopped at 10/04/21 2352  •  sodium chloride 0.9 % flush 10 mL, 10 mL, Intravenous, PRN, Raj Quinones MD  •  sodium chloride 0.9 % flush 10 mL, 10 mL, Intravenous, Q12H, Raj Quinones MD, 10 mL at 10/08/21 0819  •  sodium chloride 0.9 % flush 10 mL, 10 mL, Intravenous, PRN, Raj Quinones MD    I have  reviewed the patient's current medications.     Results Review:  I have reviewed the labs, radiology results, and diagnostic studies.    Laboratory Data:   Results from last 7 days   Lab Units 10/08/21  1150 10/08/21  0829 10/08/21  0414 10/04/21  2359 10/04/21  2007   SODIUM mmol/L 145 147* 149*   < > 142   POTASSIUM mmol/L 3.4* 3.5 3.5   < > 4.8   CHLORIDE mmol/L 109* 110* 112*   < > 113*   CO2 mmol/L 22.0 23.0 23.0   < > 2.0*   BUN mg/dL 4* 4* 4*   < > 17   CREATININE mg/dL 0.46* 0.56* 0.60   < > 1.02*   GLUCOSE mg/dL 201* 205* 205*   < > 214*   CALCIUM mg/dL 9.5 9.2 9.5   < > 9.3   BILIRUBIN mg/dL  --   --   --   --  0.2   ALK PHOS U/L  --   --   --   --  155*   ALT (SGPT) U/L  --   --   --   --  20   AST (SGOT) U/L  --   --   --   --  22   ANION GAP mmol/L 14.0 14.0 14.0   < > 27.0*    < > = values in this interval not displayed.     Estimated Creatinine Clearance: 213.3 mL/min (A) (by C-G formula based on SCr of 0.46 mg/dL (L)).  Results from last 7 days   Lab Units 10/08/21  1150 10/08/21  0829 10/08/21  0414   MAGNESIUM mg/dL 1.8 1.8 1.9   PHOSPHORUS mg/dL 2.7 2.8 2.6         Results from last 7 days   Lab Units 10/08/21  0414 10/07/21  0403 10/06/21  0420 10/05/21  0405 10/04/21  1800   WBC 10*3/mm3 10.61 12.69* 13.69* 22.13* 30.01*   HEMOGLOBIN g/dL 15.0 14.9 14.5 16.5* 17.1*   HEMATOCRIT % 42.8 42.0 41.1 48.6* 51.0*   PLATELETS 10*3/mm3 148 180 194 219 265           Culture Data:   No results found for: BLOODCX  No results found for: URINECX  No results found for: RESPCX  No results found for: WOUNDCX  No results found for: STOOLCX  No components found for: BODYFLD    Radiology Data:   Imaging Results (Last 24 Hours)     ** No results found for the last 24 hours. **          Assessment/Plan     Hospital Problem List:  Principal Problem:    DKA (diabetic ketoacidosis) ([facility])  Active Problems:    Acute UTI (urinary tract infection)    Sepsis, unspecified organism ([facility])  Metabolic  acidosis  Hypernatremia     Plan  -Patient's DKA has resolved.  Her DKA was likely due to combination of urinary tract infection, sepsis and SGLT2 inhibitor  -Hypernatremia is also resolved and anion gap is closed  -We will transition patient to subcutaneous Levemir 15 units every 12 hours and sliding scale NovoLog  -Endocrinology input appreciated  -Start IV D5 half-normal saline with 10 M EQ of K at 150 cc an hour until patient is able to feed herself adequately  -Okay to feed patient with assistance  -Monitor mental status closely as patient is confused but is improving  -Will sparingly use Ativan only for extreme agitation.  -If patient's mental status improves will transfer off CCU in the evening  -Monitor potassium and electrolytes and replete as indicated  -Continue IV antibiotics with ceftriaxone for acute cystitis  -CT of the abdomen and pelvis did not show any pyelonephritis or intra-abdominal infection  -Blood cultures negative  -Continue home antihypertensive medications  -DVT prophylaxis with subcu Lovenox  -CODE STATUS is full code     32 minutes of critical care time was spent evaluating patient, reviewing documentation and planning treatment.    Discharge Planning: In progress    I confirmed that the patient's Advance Care Plan is present, code status is documented, or surrogate decision maker is listed in the patient's medical record.      I have utilized all available immediate resources to obtain, update, or review the patient's current medications.      Raj Quinones MD   10/08/21   14:38 CDT

## 2021-10-09 ENCOUNTER — APPOINTMENT (OUTPATIENT)
Dept: CT IMAGING | Facility: HOSPITAL | Age: 31
End: 2021-10-09

## 2021-10-09 LAB
ANION GAP SERPL CALCULATED.3IONS-SCNC: 18 MMOL/L (ref 5–15)
ANION GAP SERPL CALCULATED.3IONS-SCNC: 19 MMOL/L (ref 5–15)
BACTERIA SPEC AEROBE CULT: NORMAL
BACTERIA SPEC AEROBE CULT: NORMAL
BASOPHILS # BLD AUTO: 0.03 10*3/MM3 (ref 0–0.2)
BASOPHILS NFR BLD AUTO: 0.3 % (ref 0–1.5)
BUN SERPL-MCNC: 5 MG/DL (ref 6–20)
BUN SERPL-MCNC: 6 MG/DL (ref 6–20)
BUN/CREAT SERPL: 11.4 (ref 7–25)
BUN/CREAT SERPL: 12 (ref 7–25)
CALCIUM SPEC-SCNC: 9.1 MG/DL (ref 8.6–10.5)
CALCIUM SPEC-SCNC: 9.2 MG/DL (ref 8.6–10.5)
CHLORIDE SERPL-SCNC: 105 MMOL/L (ref 98–107)
CHLORIDE SERPL-SCNC: 107 MMOL/L (ref 98–107)
CO2 SERPL-SCNC: 18 MMOL/L (ref 22–29)
CO2 SERPL-SCNC: 20 MMOL/L (ref 22–29)
CREAT SERPL-MCNC: 0.44 MG/DL (ref 0.57–1)
CREAT SERPL-MCNC: 0.5 MG/DL (ref 0.57–1)
DEPRECATED RDW RBC AUTO: 44.9 FL (ref 37–54)
EOSINOPHIL # BLD AUTO: 0.04 10*3/MM3 (ref 0–0.4)
EOSINOPHIL NFR BLD AUTO: 0.4 % (ref 0.3–6.2)
ERYTHROCYTE [DISTWIDTH] IN BLOOD BY AUTOMATED COUNT: 13.9 % (ref 12.3–15.4)
GFR SERPL CREATININE-BSD FRML MDRD: 147 ML/MIN/1.73
GFR SERPL CREATININE-BSD FRML MDRD: >150 ML/MIN/1.73
GLUCOSE BLDC GLUCOMTR-MCNC: 165 MG/DL (ref 70–130)
GLUCOSE BLDC GLUCOMTR-MCNC: 204 MG/DL (ref 70–130)
GLUCOSE BLDC GLUCOMTR-MCNC: 205 MG/DL (ref 70–130)
GLUCOSE BLDC GLUCOMTR-MCNC: 205 MG/DL (ref 70–130)
GLUCOSE SERPL-MCNC: 142 MG/DL (ref 65–99)
GLUCOSE SERPL-MCNC: 241 MG/DL (ref 65–99)
HCT VFR BLD AUTO: 43.6 % (ref 34–46.6)
HGB BLD-MCNC: 14.8 G/DL (ref 12–15.9)
IMM GRANULOCYTES # BLD AUTO: 0.12 10*3/MM3 (ref 0–0.05)
IMM GRANULOCYTES NFR BLD AUTO: 1.2 % (ref 0–0.5)
LYMPHOCYTES # BLD AUTO: 2.35 10*3/MM3 (ref 0.7–3.1)
LYMPHOCYTES NFR BLD AUTO: 23 % (ref 19.6–45.3)
MCH RBC QN AUTO: 30.5 PG (ref 26.6–33)
MCHC RBC AUTO-ENTMCNC: 33.9 G/DL (ref 31.5–35.7)
MCV RBC AUTO: 89.9 FL (ref 79–97)
MONOCYTES # BLD AUTO: 0.67 10*3/MM3 (ref 0.1–0.9)
MONOCYTES NFR BLD AUTO: 6.5 % (ref 5–12)
NEUTROPHILS NFR BLD AUTO: 68.6 % (ref 42.7–76)
NEUTROPHILS NFR BLD AUTO: 7.02 10*3/MM3 (ref 1.7–7)
NRBC BLD AUTO-RTO: 0 /100 WBC (ref 0–0.2)
PLATELET # BLD AUTO: 124 10*3/MM3 (ref 140–450)
PMV BLD AUTO: 13 FL (ref 6–12)
POTASSIUM SERPL-SCNC: 3.6 MMOL/L (ref 3.5–5.2)
POTASSIUM SERPL-SCNC: 3.8 MMOL/L (ref 3.5–5.2)
RBC # BLD AUTO: 4.85 10*6/MM3 (ref 3.77–5.28)
SODIUM SERPL-SCNC: 142 MMOL/L (ref 136–145)
SODIUM SERPL-SCNC: 145 MMOL/L (ref 136–145)
WBC # BLD AUTO: 10.23 10*3/MM3 (ref 3.4–10.8)

## 2021-10-09 PROCEDURE — 63710000001 INSULIN ASPART PER 5 UNITS: Performed by: INTERNAL MEDICINE

## 2021-10-09 PROCEDURE — 25010000002 CEFTRIAXONE PER 250 MG: Performed by: INTERNAL MEDICINE

## 2021-10-09 PROCEDURE — 71275 CT ANGIOGRAPHY CHEST: CPT

## 2021-10-09 PROCEDURE — 80048 BASIC METABOLIC PNL TOTAL CA: CPT | Performed by: INTERNAL MEDICINE

## 2021-10-09 PROCEDURE — 63710000001 INSULIN DETEMIR PER 5 UNITS: Performed by: INTERNAL MEDICINE

## 2021-10-09 PROCEDURE — 85025 COMPLETE CBC W/AUTO DIFF WBC: CPT | Performed by: INTERNAL MEDICINE

## 2021-10-09 PROCEDURE — 82962 GLUCOSE BLOOD TEST: CPT

## 2021-10-09 PROCEDURE — 0 IOPAMIDOL PER 1 ML: Performed by: INTERNAL MEDICINE

## 2021-10-09 RX ORDER — DICYCLOMINE HYDROCHLORIDE 10 MG/1
20 CAPSULE ORAL 4 TIMES DAILY PRN
Status: DISCONTINUED | OUTPATIENT
Start: 2021-10-09 | End: 2021-10-11 | Stop reason: HOSPADM

## 2021-10-09 RX ORDER — SODIUM CHLORIDE 9 MG/ML
100 INJECTION, SOLUTION INTRAVENOUS CONTINUOUS
Status: DISCONTINUED | OUTPATIENT
Start: 2021-10-09 | End: 2021-10-10

## 2021-10-09 RX ORDER — LOPERAMIDE HYDROCHLORIDE 2 MG/1
2 CAPSULE ORAL 4 TIMES DAILY PRN
Status: DISCONTINUED | OUTPATIENT
Start: 2021-10-09 | End: 2021-10-11 | Stop reason: HOSPADM

## 2021-10-09 RX ORDER — ESCITALOPRAM OXALATE 10 MG/1
10 TABLET ORAL DAILY
Status: DISCONTINUED | OUTPATIENT
Start: 2021-10-10 | End: 2021-10-11 | Stop reason: HOSPADM

## 2021-10-09 RX ADMIN — POTASSIUM CHLORIDE, DEXTROSE MONOHYDRATE AND SODIUM CHLORIDE 150 ML/HR: 75; 5; 450 INJECTION, SOLUTION INTRAVENOUS at 09:22

## 2021-10-09 RX ADMIN — INSULIN ASPART 2 UNITS: 100 INJECTION, SOLUTION INTRAVENOUS; SUBCUTANEOUS at 09:31

## 2021-10-09 RX ADMIN — CLONIDINE HYDROCHLORIDE 0.1 MG: 0.1 TABLET ORAL at 09:22

## 2021-10-09 RX ADMIN — SODIUM CHLORIDE 100 ML/HR: 900 INJECTION, SOLUTION INTRAVENOUS at 11:53

## 2021-10-09 RX ADMIN — INSULIN ASPART 4 UNITS: 100 INJECTION, SOLUTION INTRAVENOUS; SUBCUTANEOUS at 20:46

## 2021-10-09 RX ADMIN — CEFTRIAXONE SODIUM 2 G: 2 INJECTION, POWDER, FOR SOLUTION INTRAMUSCULAR; INTRAVENOUS at 18:23

## 2021-10-09 RX ADMIN — LISINOPRIL 20 MG: 20 TABLET ORAL at 09:22

## 2021-10-09 RX ADMIN — INSULIN ASPART 2 UNITS: 100 INJECTION, SOLUTION INTRAVENOUS; SUBCUTANEOUS at 03:47

## 2021-10-09 RX ADMIN — INSULIN DETEMIR 15 UNITS: 100 INJECTION, SOLUTION SUBCUTANEOUS at 10:18

## 2021-10-09 RX ADMIN — SODIUM CHLORIDE 100 ML/HR: 900 INJECTION, SOLUTION INTRAVENOUS at 10:23

## 2021-10-09 RX ADMIN — IOPAMIDOL 60 ML: 755 INJECTION, SOLUTION INTRAVENOUS at 18:01

## 2021-10-09 RX ADMIN — INSULIN ASPART 4 UNITS: 100 INJECTION, SOLUTION INTRAVENOUS; SUBCUTANEOUS at 20:45

## 2021-10-09 RX ADMIN — SODIUM CHLORIDE, PRESERVATIVE FREE 10 ML: 5 INJECTION INTRAVENOUS at 20:50

## 2021-10-09 RX ADMIN — SODIUM CHLORIDE, PRESERVATIVE FREE 10 ML: 5 INJECTION INTRAVENOUS at 09:33

## 2021-10-09 RX ADMIN — INSULIN ASPART 4 UNITS: 100 INJECTION, SOLUTION INTRAVENOUS; SUBCUTANEOUS at 12:04

## 2021-10-09 RX ADMIN — INSULIN ASPART 4 UNITS: 100 INJECTION, SOLUTION INTRAVENOUS; SUBCUTANEOUS at 17:18

## 2021-10-09 RX ADMIN — INSULIN DETEMIR 20 UNITS: 100 INJECTION, SOLUTION SUBCUTANEOUS at 20:47

## 2021-10-09 NOTE — PLAN OF CARE
Goal Outcome Evaluation:  Plan of Care Reviewed With: patient, family        Progress: improving  Outcome Summary: Pt. appears to be improving. Alert and oriented x4, cooperative, and following commands. Insuling gtt discontinued.

## 2021-10-09 NOTE — PROGRESS NOTES
[facility] Medicine Services  INPATIENT PROGRESS NOTE    Length of Stay: 5  Date of Admission: 10/4/2021  Primary Care Physician: Bj Duckworth MD    Subjective     Chief Complaint: Dysuria, abdominal pain, confusion    HPI: Patient is clinically improved today.  She is alert and oriented x3. She tolerated her breakfast and lunch.  She has some diarrhea but states that this is usual for her as she has irritable bowel syndrome.  She denies abdominal pain.  She had some episodes of tachycardia when she uses the bedside commode.    Review of Systems   Constitutional: Negative for activity change, appetite change, chills, fatigue and fever.   HENT: Negative for congestion, ear pain, rhinorrhea, sore throat and trouble swallowing.    Respiratory: Negative for cough, chest tightness, shortness of breath and wheezing.    Cardiovascular: Negative for chest pain, palpitations and leg swelling.   Gastrointestinal: Positive for diarrhea. Negative for abdominal distention, abdominal pain, nausea and vomiting.   Genitourinary: Negative for difficulty urinating, dysuria and hematuria.   Musculoskeletal: Negative for arthralgias, back pain and myalgias.   Skin: Negative for pallor and rash.   Neurological: Negative for dizziness, syncope, weakness, light-headedness and headaches.   Hematological: Negative for adenopathy. Does not bruise/bleed easily.   Psychiatric/Behavioral: Negative for agitation and confusion. The patient is not nervous/anxious.      Objective    Temp:  [96.5 °F (35.8 °C)-98.1 °F (36.7 °C)] 97.3 °F (36.3 °C)  Heart Rate:  [] 109  Resp:  [20-30] 20  BP: (103-152)/(50-95) 140/67    Physical Exam  Constitutional:       General: She is not in acute distress.     Appearance: Normal appearance. She is obese. She is not ill-appearing or diaphoretic.   HENT:      Head: Normocephalic and atraumatic.      Right Ear: External ear normal.      Left Ear: External ear  normal.      Nose: No congestion or rhinorrhea.      Mouth/Throat:      Mouth: Mucous membranes are moist.      Pharynx: No oropharyngeal exudate or posterior oropharyngeal erythema.   Eyes:      General: No scleral icterus.     Extraocular Movements: Extraocular movements intact.      Conjunctiva/sclera: Conjunctivae normal.   Cardiovascular:      Rate and Rhythm: Normal rate and regular rhythm.      Heart sounds: Normal heart sounds. No murmur heard.      Pulmonary:      Effort: Pulmonary effort is normal. No respiratory distress.      Breath sounds: Normal breath sounds. No wheezing, rhonchi or rales.   Abdominal:      General: Abdomen is flat. There is no distension.      Palpations: Abdomen is soft.      Tenderness: There is no abdominal tenderness. There is no guarding.   Musculoskeletal:         General: No swelling, tenderness or deformity.      Cervical back: Neck supple. No rigidity. No muscular tenderness.      Right lower leg: No edema.      Left lower leg: No edema.   Lymphadenopathy:      Cervical: No cervical adenopathy.   Skin:     General: Skin is warm and dry.   Neurological:      General: No focal deficit present.      Mental Status: She is alert and oriented to person, place, and time.      Cranial Nerves: No cranial nerve deficit.      Motor: No weakness.   Psychiatric:         Mood and Affect: Mood normal.         Behavior: Behavior normal.         Thought Content: Thought content normal.       Medication Review:    Current Facility-Administered Medications:   •  acetaminophen (TYLENOL) tablet 650 mg, 650 mg, Oral, Q4H PRN, Raj Quinones MD  •  cefTRIAXone (ROCEPHIN) 2 g/100 mL 0.9% NS IVPB (MBP), 2 g, Intravenous, Q24H, Raj Quinones MD, 2 g at 10/08/21 8250  •  cloNIDine (CATAPRES) tablet 0.1 mg, 0.1 mg, Oral, Daily, Michael Bell Jr., MD, 0.1 mg at 10/09/21 4803  •  dicyclomine (BENTYL) capsule 20 mg, 20 mg, Oral, 4x Daily PRN, Raj Quinones MD  •  enoxaparin (LOVENOX)  syringe 40 mg, 40 mg, Subcutaneous, Q24H, Raj Quinones MD, 40 mg at 10/08/21 1740  •  [START ON 10/10/2021] escitalopram (LEXAPRO) tablet 10 mg, 10 mg, Oral, Daily, Raj Quinones MD  •  insulin aspart (novoLOG) injection 2-10 Units, 2-10 Units, Subcutaneous, TID With Meals, Christopher Cam MD, 2 Units at 10/08/21 1759  •  insulin aspart (novoLOG) injection 2-8 Units, 2-8 Units, Subcutaneous, 4x Daily, Christopher Cam MD, 4 Units at 10/09/21 1718  •  insulin aspart (novoLOG) injection 2-8 Units, 2-8 Units, Subcutaneous, Q24H, Christopher Cam MD, 2 Units at 10/09/21 0347  •  insulin detemir (LEVEMIR) injection 20 Units, 20 Units, Subcutaneous, Q12H, Raj Quinones MD  •  lisinopril (PRINIVIL,ZESTRIL) tablet 20 mg, 20 mg, Oral, Q24H, Michael Bell Jr., MD, 20 mg at 10/09/21 0922  •  loperamide (IMODIUM) capsule 2 mg, 2 mg, Oral, 4x Daily PRN, Raj Quinones MD  •  LORazepam (ATIVAN) injection 1 mg, 1 mg, Intravenous, Q4H PRN, Michael Bell Jr., MD, 1 mg at 10/07/21 2009  •  Magnesium Sulfate 2 gram Bolus, followed by 8 gram infusion (total Mg dose 10 grams)- Mg less than or equal to 1mg/dL, 2 g, Intravenous, PRN **OR** Magnesium Sulfate 2 gram / 50mL Infusion (GIVE X 3 BAGS TO EQUAL 6GM TOTAL DOSE) - Mg 1.1 - 1.5 mg/dl, 2 g, Intravenous, PRN **OR** Magnesium Sulfate 4 gram infusion- Mg 1.6-1.9 mg/dL, 4 g, Intravenous, PRN, Raj Quinones MD  •  morphine injection 2 mg, 2 mg, Intravenous, Q2H PRN, 2 mg at 10/07/21 2212 **AND** naloxone (NARCAN) injection 0.4 mg, 0.4 mg, Intravenous, Q5 Min PRN, Raj Quinones MD  •  ondansetron (ZOFRAN) injection 4 mg, 4 mg, Intravenous, Q6H PRN, Raj Quinones MD  •  potassium chloride (MICRO-K) CR capsule 40 mEq, 40 mEq, Oral, PRN **OR** potassium chloride (KLOR-CON) packet 40 mEq, 40 mEq, Oral, PRN **OR** potassium chloride 10 mEq in 100 mL IVPB, 10 mEq, Intravenous, Q1H PRN, Raj Quinones MD, Last  Rate: 100 mL/hr at 10/08/21 1738, 10 mEq at 10/08/21 1738  •  potassium phosphate 45 mmol in sodium chloride 0.9 % 500 mL infusion, 45 mmol, Intravenous, PRN, Last Rate: 62.5 mL/hr at 10/05/21 1345, 45 mmol at 10/05/21 1345 **OR** potassium phosphate 30 mmol in sodium chloride 0.9 % 250 mL infusion, 30 mmol, Intravenous, PRN, Last Rate: 31.3 mL/hr at 10/06/21 2154, 30 mmol at 10/06/21 2154 **OR** Potassium Phosphates 15 mmol in sodium chloride 0.9 % 100 mL infusion, 15 mmol, Intravenous, PRN, 15 mmol at 10/07/21 1706 **OR** sodium phosphates 45 mmol in sodium chloride 0.9 % 500 mL IVPB, 45 mmol, Intravenous, PRN **OR** sodium phosphates 30 mmol in sodium chloride 0.9 % 250 mL IVPB, 30 mmol, Intravenous, PRN **OR** sodium phosphates 15 mmol in sodium chloride 0.9 % 250 mL IVPB, 15 mmol, Intravenous, PRN, Raj Quinones MD  •  sodium chloride 0.45 % with KCl 20 mEq/L infusion, 250 mL/hr, Intravenous, Continuous PRN, Raj Quinones MD, Stopped at 10/04/21 2352  •  sodium chloride 0.9 % flush 10 mL, 10 mL, Intravenous, PRN, Raj Quinones MD  •  sodium chloride 0.9 % flush 10 mL, 10 mL, Intravenous, Q12H, Raj Quinones MD, 10 mL at 10/09/21 0933  •  sodium chloride 0.9 % flush 10 mL, 10 mL, Intravenous, PRN, Raj Quinones MD  •  sodium chloride 0.9 % infusion, 100 mL/hr, Intravenous, Continuous, Raj Quinones MD, Last Rate: 100 mL/hr at 10/09/21 1153, 100 mL/hr at 10/09/21 1153    I have reviewed the patient's current medications.     Results Review:  I have reviewed the labs, radiology results, and diagnostic studies.    Laboratory Data:   Results from last 7 days   Lab Units 10/09/21  1126 10/09/21  0450 10/08/21  2249 10/08/21  1610 10/08/21  1610 10/04/21  8209 10/04/21  2007   SODIUM mmol/L 142 145  --   --  146*   < > 142   POTASSIUM mmol/L 3.6 3.8 3.9   < > 3.7   < > 4.8   CHLORIDE mmol/L 105 107  --   --  109*   < > 113*   CO2 mmol/L 18.0* 20.0*  --   --  23.0   < > 2.0*   BUN  mg/dL 6 5*  --   --  3*   < > 17   CREATININE mg/dL 0.50* 0.44*  --   --  0.52*   < > 1.02*   GLUCOSE mg/dL 241* 142*  --   --  181*   < > 214*   CALCIUM mg/dL 9.1 9.2  --   --  9.4   < > 9.3   BILIRUBIN mg/dL  --   --   --   --   --   --  0.2   ALK PHOS U/L  --   --   --   --   --   --  155*   ALT (SGPT) U/L  --   --   --   --   --   --  20   AST (SGOT) U/L  --   --   --   --   --   --  22   ANION GAP mmol/L 19.0* 18.0*  --   --  14.0   < > 27.0*    < > = values in this interval not displayed.     Estimated Creatinine Clearance: 196.2 mL/min (A) (by C-G formula based on SCr of 0.5 mg/dL (L)).  Results from last 7 days   Lab Units 10/08/21  1610 10/08/21  1150 10/08/21  0829   MAGNESIUM mg/dL 1.9 1.8 1.8   PHOSPHORUS mg/dL 3.1 2.7 2.8         Results from last 7 days   Lab Units 10/09/21  0450 10/08/21  0414 10/07/21  0403 10/06/21  0420 10/05/21  0405   WBC 10*3/mm3 10.23 10.61 12.69* 13.69* 22.13*   HEMOGLOBIN g/dL 14.8 15.0 14.9 14.5 16.5*   HEMATOCRIT % 43.6 42.8 42.0 41.1 48.6*   PLATELETS 10*3/mm3 124* 148 180 194 219           Culture Data:   No results found for: BLOODCX  No results found for: URINECX  No results found for: RESPCX  No results found for: WOUNDCX  No results found for: STOOLCX  No components found for: BODYFLD    Radiology Data:   Imaging Results (Last 24 Hours)     ** No results found for the last 24 hours. **          Assessment/Plan     Hospital Problem List:  Principal Problem:    DKA (diabetic ketoacidosis) ([facility])  Active Problems:    Acute UTI (urinary tract infection)    Sepsis, unspecified organism ([facility])  Metabolic acidosis  Hypernatremia     Plan  -DKA has resolved and her hypernatremia is resolved. Anion gap slightly elevated  -We will increase subcutaneous Levemir to 20 units every 12 hours  -Continue NovoLog sliding scale insulin and insulin with meals/carb counting  -Endocrinology input appreciated  -Discontinue D5 half-normal saline start IV normal saline 100 cc/hour  -Due to  patient's intermittent tachycardia will have CT of the chest to rule out PE  -Monitor potassium and electrolytes and replete as indicated  -Continue IV antibiotics with ceftriaxone for acute cystitis  -CT of the abdomen and pelvis did not show any pyelonephritis or intra-abdominal infection  -Blood cultures negative  -Continue home antihypertensive medications  -DVT prophylaxis with subcu Lovenox  -CODE STATUS is full code     Patient stable to be transferred out of the CCU    Discharge Planning: In progress    I confirmed that the patient's Advance Care Plan is present, code status is documented, or surrogate decision maker is listed in the patient's medical record.      I have utilized all available immediate resources to obtain, update, or review the patient's current medications.      Raj Quinones MD   10/09/21   17:29 CDT

## 2021-10-09 NOTE — NURSING NOTE
Pt becomes tachycardic up to 140s when up to bedisde commode.Pt returns to 90's at rest. md crowley

## 2021-10-10 LAB
ANION GAP SERPL CALCULATED.3IONS-SCNC: 15 MMOL/L (ref 5–15)
BASOPHILS # BLD AUTO: 0.02 10*3/MM3 (ref 0–0.2)
BASOPHILS NFR BLD AUTO: 0.2 % (ref 0–1.5)
BUN SERPL-MCNC: 7 MG/DL (ref 6–20)
BUN/CREAT SERPL: 17.1 (ref 7–25)
CALCIUM SPEC-SCNC: 9.1 MG/DL (ref 8.6–10.5)
CHLORIDE SERPL-SCNC: 107 MMOL/L (ref 98–107)
CO2 SERPL-SCNC: 22 MMOL/L (ref 22–29)
CREAT SERPL-MCNC: 0.41 MG/DL (ref 0.57–1)
DEPRECATED RDW RBC AUTO: 42.8 FL (ref 37–54)
EOSINOPHIL # BLD AUTO: 0.09 10*3/MM3 (ref 0–0.4)
EOSINOPHIL NFR BLD AUTO: 0.8 % (ref 0.3–6.2)
ERYTHROCYTE [DISTWIDTH] IN BLOOD BY AUTOMATED COUNT: 13.3 % (ref 12.3–15.4)
GFR SERPL CREATININE-BSD FRML MDRD: >150 ML/MIN/1.73
GLUCOSE BLDC GLUCOMTR-MCNC: 125 MG/DL (ref 70–130)
GLUCOSE BLDC GLUCOMTR-MCNC: 131 MG/DL (ref 70–130)
GLUCOSE BLDC GLUCOMTR-MCNC: 145 MG/DL (ref 70–130)
GLUCOSE BLDC GLUCOMTR-MCNC: 236 MG/DL (ref 70–130)
GLUCOSE SERPL-MCNC: 130 MG/DL (ref 65–99)
HCT VFR BLD AUTO: 42 % (ref 34–46.6)
HGB BLD-MCNC: 14.2 G/DL (ref 12–15.9)
IMM GRANULOCYTES # BLD AUTO: 0.08 10*3/MM3 (ref 0–0.05)
IMM GRANULOCYTES NFR BLD AUTO: 0.8 % (ref 0–0.5)
LYMPHOCYTES # BLD AUTO: 2.46 10*3/MM3 (ref 0.7–3.1)
LYMPHOCYTES NFR BLD AUTO: 23.2 % (ref 19.6–45.3)
MCH RBC QN AUTO: 30.3 PG (ref 26.6–33)
MCHC RBC AUTO-ENTMCNC: 33.8 G/DL (ref 31.5–35.7)
MCV RBC AUTO: 89.6 FL (ref 79–97)
MONOCYTES # BLD AUTO: 0.68 10*3/MM3 (ref 0.1–0.9)
MONOCYTES NFR BLD AUTO: 6.4 % (ref 5–12)
NEUTROPHILS NFR BLD AUTO: 68.6 % (ref 42.7–76)
NEUTROPHILS NFR BLD AUTO: 7.28 10*3/MM3 (ref 1.7–7)
NRBC BLD AUTO-RTO: 0 /100 WBC (ref 0–0.2)
PLATELET # BLD AUTO: 142 10*3/MM3 (ref 140–450)
PMV BLD AUTO: 11.7 FL (ref 6–12)
POTASSIUM SERPL-SCNC: 3.2 MMOL/L (ref 3.5–5.2)
POTASSIUM SERPL-SCNC: 4.3 MMOL/L (ref 3.5–5.2)
RBC # BLD AUTO: 4.69 10*6/MM3 (ref 3.77–5.28)
SODIUM SERPL-SCNC: 144 MMOL/L (ref 136–145)
WBC # BLD AUTO: 10.61 10*3/MM3 (ref 3.4–10.8)

## 2021-10-10 PROCEDURE — 82962 GLUCOSE BLOOD TEST: CPT

## 2021-10-10 PROCEDURE — 25010000002 CEFTRIAXONE PER 250 MG: Performed by: INTERNAL MEDICINE

## 2021-10-10 PROCEDURE — 84132 ASSAY OF SERUM POTASSIUM: CPT | Performed by: INTERNAL MEDICINE

## 2021-10-10 PROCEDURE — 85025 COMPLETE CBC W/AUTO DIFF WBC: CPT | Performed by: INTERNAL MEDICINE

## 2021-10-10 PROCEDURE — 63710000001 INSULIN DETEMIR PER 5 UNITS: Performed by: INTERNAL MEDICINE

## 2021-10-10 PROCEDURE — 63710000001 INSULIN ASPART PER 5 UNITS: Performed by: INTERNAL MEDICINE

## 2021-10-10 PROCEDURE — 80048 BASIC METABOLIC PNL TOTAL CA: CPT | Performed by: INTERNAL MEDICINE

## 2021-10-10 RX ORDER — CYCLOBENZAPRINE HCL 5 MG
5 TABLET ORAL 3 TIMES DAILY PRN
Status: DISCONTINUED | OUTPATIENT
Start: 2021-10-10 | End: 2021-10-11 | Stop reason: HOSPADM

## 2021-10-10 RX ORDER — DIAZEPAM 2 MG/1
4 TABLET ORAL EVERY 8 HOURS PRN
Status: DISCONTINUED | OUTPATIENT
Start: 2021-10-10 | End: 2021-10-11 | Stop reason: HOSPADM

## 2021-10-10 RX ADMIN — INSULIN DETEMIR 20 UNITS: 100 INJECTION, SOLUTION SUBCUTANEOUS at 08:49

## 2021-10-10 RX ADMIN — INSULIN ASPART 8 UNITS: 100 INJECTION, SOLUTION INTRAVENOUS; SUBCUTANEOUS at 08:47

## 2021-10-10 RX ADMIN — ACETAMINOPHEN 650 MG: 325 TABLET, FILM COATED ORAL at 17:18

## 2021-10-10 RX ADMIN — SODIUM CHLORIDE, PRESERVATIVE FREE 10 ML: 5 INJECTION INTRAVENOUS at 21:11

## 2021-10-10 RX ADMIN — ESCITALOPRAM OXALATE 10 MG: 10 TABLET ORAL at 08:49

## 2021-10-10 RX ADMIN — LISINOPRIL 20 MG: 20 TABLET ORAL at 08:49

## 2021-10-10 RX ADMIN — CYCLOBENZAPRINE 5 MG: 5 TABLET, FILM COATED ORAL at 20:59

## 2021-10-10 RX ADMIN — CLONIDINE HYDROCHLORIDE 0.1 MG: 0.1 TABLET ORAL at 08:49

## 2021-10-10 RX ADMIN — INSULIN ASPART 2 UNITS: 100 INJECTION, SOLUTION INTRAVENOUS; SUBCUTANEOUS at 12:06

## 2021-10-10 RX ADMIN — INSULIN DETEMIR 20 UNITS: 100 INJECTION, SOLUTION SUBCUTANEOUS at 21:04

## 2021-10-10 RX ADMIN — POTASSIUM CHLORIDE 40 MEQ: 750 CAPSULE, EXTENDED RELEASE ORAL at 08:49

## 2021-10-10 RX ADMIN — CEFTRIAXONE SODIUM 2 G: 2 INJECTION, POWDER, FOR SOLUTION INTRAMUSCULAR; INTRAVENOUS at 17:18

## 2021-10-10 RX ADMIN — INSULIN ASPART 4 UNITS: 100 INJECTION, SOLUTION INTRAVENOUS; SUBCUTANEOUS at 12:07

## 2021-10-10 RX ADMIN — POTASSIUM CHLORIDE 40 MEQ: 750 CAPSULE, EXTENDED RELEASE ORAL at 12:10

## 2021-10-10 RX ADMIN — INSULIN ASPART 4 UNITS: 100 INJECTION, SOLUTION INTRAVENOUS; SUBCUTANEOUS at 21:08

## 2021-10-10 RX ADMIN — DIAZEPAM 4 MG: 2 TABLET ORAL at 20:59

## 2021-10-10 NOTE — PROGRESS NOTES
[facility] Medicine Services  INPATIENT PROGRESS NOTE    Length of Stay: 6  Date of Admission: 10/4/2021  Primary Care Physician: Bj Duckworth MD    Subjective     Chief Complaint: Dysuria, abdominal pain, confusion    HPI: Patient feels much improved today.  She is oriented x3.  She denies abdominal pain.    Review of Systems   Constitutional: Negative for activity change, appetite change, chills, fatigue and fever.   HENT: Negative for congestion, ear pain, rhinorrhea, sore throat and trouble swallowing.    Respiratory: Negative for cough, chest tightness, shortness of breath and wheezing.    Cardiovascular: Negative for chest pain, palpitations and leg swelling.   Gastrointestinal: Positive for diarrhea. Negative for abdominal distention, abdominal pain, nausea and vomiting.   Genitourinary: Negative for difficulty urinating, dysuria and hematuria.   Musculoskeletal: Negative for arthralgias, back pain and myalgias.   Skin: Negative for pallor and rash.   Neurological: Negative for dizziness, syncope, weakness, light-headedness and headaches.   Hematological: Negative for adenopathy. Does not bruise/bleed easily.   Psychiatric/Behavioral: Negative for agitation and confusion. The patient is not nervous/anxious.      Objective    Temp:  [97 °F (36.1 °C)-97.6 °F (36.4 °C)] 97 °F (36.1 °C)  Heart Rate:  [] 95  Resp:  [18] 18  BP: (107-143)/(54-85) 107/54    Physical Exam  Constitutional:       General: She is not in acute distress.     Appearance: Normal appearance. She is obese. She is not ill-appearing or diaphoretic.   HENT:      Head: Normocephalic and atraumatic.      Right Ear: External ear normal.      Left Ear: External ear normal.      Nose: No congestion or rhinorrhea.      Mouth/Throat:      Mouth: Mucous membranes are moist.      Pharynx: No oropharyngeal exudate or posterior oropharyngeal erythema.   Eyes:      General: No scleral icterus.      Extraocular Movements: Extraocular movements intact.      Conjunctiva/sclera: Conjunctivae normal.   Cardiovascular:      Rate and Rhythm: Normal rate and regular rhythm.      Heart sounds: Normal heart sounds. No murmur heard.      Pulmonary:      Effort: Pulmonary effort is normal. No respiratory distress.      Breath sounds: Normal breath sounds. No wheezing, rhonchi or rales.   Abdominal:      General: Abdomen is flat. There is no distension.      Palpations: Abdomen is soft.      Tenderness: There is no abdominal tenderness. There is no guarding.   Musculoskeletal:         General: No swelling, tenderness or deformity.      Cervical back: Neck supple. No rigidity. No muscular tenderness.      Right lower leg: No edema.      Left lower leg: No edema.   Lymphadenopathy:      Cervical: No cervical adenopathy.   Skin:     General: Skin is warm and dry.   Neurological:      General: No focal deficit present.      Mental Status: She is alert and oriented to person, place, and time.      Cranial Nerves: No cranial nerve deficit.      Motor: No weakness.   Psychiatric:         Mood and Affect: Mood normal.         Behavior: Behavior normal.         Thought Content: Thought content normal.       Medication Review:    Current Facility-Administered Medications:   •  acetaminophen (TYLENOL) tablet 650 mg, 650 mg, Oral, Q4H PRN, Raj Quinones MD  •  cefTRIAXone (ROCEPHIN) 2 g/100 mL 0.9% NS IVPB (MBP), 2 g, Intravenous, Q24H, Raj Quinones MD, 2 g at 10/09/21 1823  •  cloNIDine (CATAPRES) tablet 0.1 mg, 0.1 mg, Oral, Daily, Raj Quinones MD, 0.1 mg at 10/10/21 0849  •  dicyclomine (BENTYL) capsule 20 mg, 20 mg, Oral, 4x Daily PRN, Raj Quinones MD  •  enoxaparin (LOVENOX) syringe 40 mg, 40 mg, Subcutaneous, Q24H, Raj Quinones MD, 40 mg at 10/08/21 1740  •  escitalopram (LEXAPRO) tablet 10 mg, 10 mg, Oral, Daily, Raj Quinones MD, 10 mg at 10/10/21 0849  •  insulin aspart (novoLOG)  injection 2-10 Units, 2-10 Units, Subcutaneous, TID With Meals, Raj Quinones MD, 2 Units at 10/10/21 1206  •  insulin aspart (novoLOG) injection 2-8 Units, 2-8 Units, Subcutaneous, 4x Daily, Raj Quinones MD, 4 Units at 10/10/21 1207  •  insulin aspart (novoLOG) injection 2-8 Units, 2-8 Units, Subcutaneous, Q24H, Raj Quinones MD, 2 Units at 10/09/21 0347  •  insulin detemir (LEVEMIR) injection 20 Units, 20 Units, Subcutaneous, Q12H, Raj Quinones MD, 20 Units at 10/10/21 0849  •  lisinopril (PRINIVIL,ZESTRIL) tablet 20 mg, 20 mg, Oral, Q24H, Raj Quinones MD, 20 mg at 10/10/21 0849  •  loperamide (IMODIUM) capsule 2 mg, 2 mg, Oral, 4x Daily PRN, Raj Quinones MD  •  LORazepam (ATIVAN) injection 1 mg, 1 mg, Intravenous, Q4H PRN, Raj Quinones MD, 1 mg at 10/07/21 2009  •  Magnesium Sulfate 2 gram Bolus, followed by 8 gram infusion (total Mg dose 10 grams)- Mg less than or equal to 1mg/dL, 2 g, Intravenous, PRN **OR** Magnesium Sulfate 2 gram / 50mL Infusion (GIVE X 3 BAGS TO EQUAL 6GM TOTAL DOSE) - Mg 1.1 - 1.5 mg/dl, 2 g, Intravenous, PRN **OR** Magnesium Sulfate 4 gram infusion- Mg 1.6-1.9 mg/dL, 4 g, Intravenous, PRN, Raj Quinones MD  •  morphine injection 2 mg, 2 mg, Intravenous, Q2H PRN, 2 mg at 10/07/21 2212 **AND** naloxone (NARCAN) injection 0.4 mg, 0.4 mg, Intravenous, Q5 Min PRN, Raj Quinones MD  •  ondansetron (ZOFRAN) injection 4 mg, 4 mg, Intravenous, Q6H PRN, Raj Quinones MD  •  potassium chloride (MICRO-K) CR capsule 40 mEq, 40 mEq, Oral, PRN, 40 mEq at 10/10/21 1210 **OR** potassium chloride (KLOR-CON) packet 40 mEq, 40 mEq, Oral, PRN **OR** potassium chloride 10 mEq in 100 mL IVPB, 10 mEq, Intravenous, Q1H PRN, Raj Quinones MD, Last Rate: 100 mL/hr at 10/08/21 1738, 10 mEq at 10/08/21 1738  •  potassium phosphate 45 mmol in sodium chloride 0.9 % 500 mL infusion, 45 mmol, Intravenous, PRN, Last Rate: 62.5 mL/hr at 10/05/21 1345,  45 mmol at 10/05/21 1345 **OR** potassium phosphate 30 mmol in sodium chloride 0.9 % 250 mL infusion, 30 mmol, Intravenous, PRN, Last Rate: 31.3 mL/hr at 10/06/21 2154, 30 mmol at 10/06/21 2154 **OR** Potassium Phosphates 15 mmol in sodium chloride 0.9 % 100 mL infusion, 15 mmol, Intravenous, PRN, 15 mmol at 10/07/21 1706 **OR** sodium phosphates 45 mmol in sodium chloride 0.9 % 500 mL IVPB, 45 mmol, Intravenous, PRN **OR** sodium phosphates 30 mmol in sodium chloride 0.9 % 250 mL IVPB, 30 mmol, Intravenous, PRN **OR** sodium phosphates 15 mmol in sodium chloride 0.9 % 250 mL IVPB, 15 mmol, Intravenous, PRN, Raj Quinones MD  •  sodium chloride 0.45 % with KCl 20 mEq/L infusion, 250 mL/hr, Intravenous, Continuous PRN, Raj Quinones MD, Stopped at 10/04/21 2352  •  sodium chloride 0.9 % flush 10 mL, 10 mL, Intravenous, PRN, Raj Quinones MD  •  sodium chloride 0.9 % flush 10 mL, 10 mL, Intravenous, Q12H, Raj Quinones MD, 10 mL at 10/09/21 2050  •  sodium chloride 0.9 % flush 10 mL, 10 mL, Intravenous, PRN, Raj Quinones MD    I have reviewed the patient's current medications.     Results Review:  I have reviewed the labs, radiology results, and diagnostic studies.    Laboratory Data:   Results from last 7 days   Lab Units 10/10/21  0659 10/09/21  1126 10/09/21  0450 10/04/21  2359 10/04/21  2007   SODIUM mmol/L 144 142 145   < > 142   POTASSIUM mmol/L 3.2* 3.6 3.8   < > 4.8   CHLORIDE mmol/L 107 105 107   < > 113*   CO2 mmol/L 22.0 18.0* 20.0*   < > 2.0*   BUN mg/dL 7 6 5*   < > 17   CREATININE mg/dL 0.41* 0.50* 0.44*   < > 1.02*   GLUCOSE mg/dL 130* 241* 142*   < > 214*   CALCIUM mg/dL 9.1 9.1 9.2   < > 9.3   BILIRUBIN mg/dL  --   --   --   --  0.2   ALK PHOS U/L  --   --   --   --  155*   ALT (SGPT) U/L  --   --   --   --  20   AST (SGOT) U/L  --   --   --   --  22   ANION GAP mmol/L 15.0 19.0* 18.0*   < > 27.0*    < > = values in this interval not displayed.     Estimated Creatinine  Clearance: 252.8 mL/min (A) (by C-G formula based on SCr of 0.41 mg/dL (L)).  Results from last 7 days   Lab Units 10/08/21  1610 10/08/21  1150 10/08/21  0829   MAGNESIUM mg/dL 1.9 1.8 1.8   PHOSPHORUS mg/dL 3.1 2.7 2.8         Results from last 7 days   Lab Units 10/10/21  0659 10/09/21  0450 10/08/21  0414 10/07/21  0403 10/06/21  0420   WBC 10*3/mm3 10.61 10.23 10.61 12.69* 13.69*   HEMOGLOBIN g/dL 14.2 14.8 15.0 14.9 14.5   HEMATOCRIT % 42.0 43.6 42.8 42.0 41.1   PLATELETS 10*3/mm3 142 124* 148 180 194           Culture Data:   No results found for: BLOODCX  No results found for: URINECX  No results found for: RESPCX  No results found for: WOUNDCX  No results found for: STOOLCX  No components found for: BODYFLD    Radiology Data:   Imaging Results (Last 24 Hours)     Procedure Component Value Units Date/Time    CT Angiogram Chest [[id number]] Collected: 10/09/21 1759     Updated: 10/09/21 1953    Narrative:      EXAM:  CT CHEST ANGIOGRAPHY WITH IV CONTRAST    ORDERING PROVIDER:  MARILY SERRATO    CLINICAL HISTORY:  Shortness of breath     COMPARISON:      TECHNIQUE:   Chest CT was performed using a high resolution pulmonary  angiogram protocol with 60ml of Isovue 370 as IV contrast and  reformatted in the sagittal and coronal planes.     3-dimensional images also acquired with special processing of the  CT scan data with a specialized workstation for evaluation.    This examination was performed according to our departmental dose  optimization program which includes automated exposure control,  adjustment of the MA and kV according to patient size, and/or use  of iterative reconstruction technique.     FINDINGS:     LUNGS AND PLEURA: No atelectasis, scar, consolidation or  masses.No pleural effusion or plaques.  Normal interstitium.    HEART: Normal size and configuration. No pericardial effusion.     MEDIASTINUM AND RANDA: No significant adenopathy. Left hilar lymph  node with short axis measuring less than 1  cm.    AORTA AND GREAT VESSELS: No aneurysm or dissection.     PULMONARY ARTERIES: No filling defects.     UPPER ABDOMEN: Diffuse fatty change of liver. Small gallstones in  the gallbladder..    MUSCULOSKELETAL:  Unremarkable. Normal vertebral body height and  alignment. No lytic or sclerotic lesion.     EXTRATHORACIC SOFT TISSUES: No axillary adenopathy. No mass.  Unremarkable supraclavicular soft tissues.       Impression:      1.  No evidence of pulmonary embolism.  2.  Diffuse fatty change of liver.   3.  Small gallstones in the gallbladder..  4.  Physiologic size lymph nodes in the left hilum.    Electronically signed by:  Man Taylor MD  10/9/2021 7:51 PM CDT  Workstation: [id number]          Assessment/Plan     Hospital Problem List:  Principal Problem:    DKA (diabetic ketoacidosis) (HCC)  Active Problems:    Acute UTI (urinary tract infection)    Sepsis, unspecified organism (HCC)  Metabolic acidosis  Hypernatremia     Plan  -Patient's DKA has resolved.  This was likely secondary to SGLT 2 inhibitor. She is tolerating oral diet and is on a sliding scale insulin along with counting carbs  -Continue subcutaneous Levemir 20 units every 12 hours  -Endocrinology input appreciated.  If patient's blood glucose remains well controlled we will plan to discharge in the next 24 hours with close follow-up with Dr. Holliday in office  -Discontinue IV fluids.  Patient's tachycardia has resolved and CTA of the chest did not show any PE  -Monitor potassium and electrolytes and replete as indicated  -Complete 7 days of IV ceftriaxone for sepsis and acute cystitis  -CT of the abdomen and pelvis did not show any pyelonephritis or intra-abdominal infection  -Blood cultures negative  -Continue home antihypertensive medications  -DVT prophylaxis with subcu Lovenox  -CODE STATUS is full code     Discharge Planning: I expect patient to be discharged in next 24 hours    I confirmed that the patient's Advance Care Plan is present,  code status is documented, or surrogate decision maker is listed in the patient's medical record.      I have utilized all available immediate resources to obtain, update, or review the patient's current medications.      Raj Quinones MD   10/10/21   14:56 CDT

## 2021-10-10 NOTE — PLAN OF CARE
Problem: Adult Inpatient Plan of Care  Goal: Absence of Hospital-Acquired Illness or Injury  Intervention: Identify and Manage Fall Risk  Recent Flowsheet Documentation  Taken 10/10/2021 0000 by Buck Bowden RN  Safety Promotion/Fall Prevention:   assistive device/personal items within reach   clutter free environment maintained   nonskid shoes/slippers when out of bed   safety round/check completed  Taken 10/9/2021 2200 by Buck Bowden RN  Safety Promotion/Fall Prevention:   safety round/check completed   nonskid shoes/slippers when out of bed   clutter free environment maintained   assistive device/personal items within reach  Taken 10/9/2021 2100 by Buck Bowden RN  Safety Promotion/Fall Prevention:   assistive device/personal items within reach   clutter free environment maintained   nonskid shoes/slippers when out of bed   safety round/check completed  Taken 10/9/2021 2000 by Buck Bowden RN  Safety Promotion/Fall Prevention:   safety round/check completed   nonskid shoes/slippers when out of bed   clutter free environment maintained   assistive device/personal items within reach  Taken 10/9/2021 1900 by Buck Bowden RN  Safety Promotion/Fall Prevention:   assistive device/personal items within reach   clutter free environment maintained   nonskid shoes/slippers when out of bed   safety round/check completed     Problem: Adult Inpatient Plan of Care  Goal: Absence of Hospital-Acquired Illness or Injury  Intervention: Prevent Skin Injury  Recent Flowsheet Documentation  Taken 10/10/2021 0000 by Buck Bowden RN  Body Position: position changed independently  Taken 10/9/2021 2200 by Buck Bowden RN  Body Position: position changed independently  Taken 10/9/2021 2100 by Buck Bowden RN  Body Position: position changed independently  Taken 10/9/2021 2000 by Buck Bowden RN  Body Position: position changed independently  Taken 10/9/2021 1900 by Buck Bowden RN  Body Position:  position changed independently     Problem: Adult Inpatient Plan of Care  Goal: Absence of Hospital-Acquired Illness or Injury  Intervention: Prevent and Manage VTE (venous thromboembolism) Risk  Recent Flowsheet Documentation  Taken 10/10/2021 0000 by Buck Bowden RN  VTE Prevention/Management: bleeding risk factor(s) identified  Taken 10/9/2021 2200 by Buck Bowden RN  VTE Prevention/Management: bleeding risk factor(s) identified  Taken 10/9/2021 2100 by Buck Bowden RN  VTE Prevention/Management: bleeding risk factor(s) identified  Taken 10/9/2021 2000 by Buck Bowden RN  VTE Prevention/Management: bleeding risk factor(s) identified  Taken 10/9/2021 1900 by Buck Bowden RN  VTE Prevention/Management: bleeding risk factor(s) identified     Problem: Adult Inpatient Plan of Care  Goal: Absence of Hospital-Acquired Illness or Injury  Intervention: Prevent Infection  Recent Flowsheet Documentation  Taken 10/10/2021 0000 by Buck Bowden RN  Infection Prevention: rest/sleep promoted  Taken 10/9/2021 2200 by Buck Bowden RN  Infection Prevention: rest/sleep promoted  Taken 10/9/2021 2100 by Buck Bowden RN  Infection Prevention: rest/sleep promoted  Taken 10/9/2021 2000 by Buck Bowden RN  Infection Prevention: rest/sleep promoted  Taken 10/9/2021 1900 by Buck Bowden RN  Infection Prevention: rest/sleep promoted     Problem: Adult Inpatient Plan of Care  Goal: Optimal Comfort and Wellbeing  Intervention: Provide Person-Centered Care  Recent Flowsheet Documentation  Taken 10/9/2021 2000 by Buck Bowden RN  Trust Relationship/Rapport:   care explained   questions encouraged     Problem: Adjustment to Illness (Sepsis/Septic Shock)  Goal: Optimal Coping  Intervention: Optimize Psychosocial Adjustment to Illness  Recent Flowsheet Documentation  Taken 10/9/2021 2000 by Buck Bowden RN  Supportive Measures: active listening utilized     Problem: Glycemic Control Impaired  (Sepsis/Septic Shock)  Goal: Blood Glucose Level Within Desired Range  Intervention: Optimize Glycemic Control  Recent Flowsheet Documentation  Taken 10/9/2021 2000 by Buck Bowden RN  Glycemic Management: blood glucose monitoring     Problem: Hemodynamic Instability (Sepsis/Septic Shock)  Goal: Effective Tissue Perfusion  Intervention: Optimize Blood Flow  Recent Flowsheet Documentation  Taken 10/9/2021 2000 by Buck Bowden RN  Fluid/Electrolyte Management: fluids provided     Problem: Infection (Sepsis/Septic Shock)  Goal: Absence of Infection Signs and Symptoms  Intervention: Prevent or Manage Infection Progression  Recent Flowsheet Documentation  Taken 10/10/2021 0000 by Buck Bowden RN  Infection Prevention: rest/sleep promoted  Taken 10/9/2021 2200 by Buck Bowden RN  Infection Prevention: rest/sleep promoted  Taken 10/9/2021 2100 by Buck Bowden RN  Infection Prevention: rest/sleep promoted  Taken 10/9/2021 2000 by Buck Bowden RN  Infection Prevention: rest/sleep promoted  Taken 10/9/2021 1900 by Buck Bowden RN  Infection Prevention: rest/sleep promoted     Problem: Nutrition Impaired (Sepsis/Septic Shock)  Goal: Optimal Nutrition Intake  Intervention: Promote and Optimize Nutrition Delivery  Recent Flowsheet Documentation  Taken 10/9/2021 2000 by Buck Bowden RN  Nutrition Support Management: weight trending reviewed     Problem: Respiratory Compromise (Sepsis/Septic Shock)  Goal: Effective Oxygenation and Ventilation  Intervention: Promote Airway Secretion Clearance  Recent Flowsheet Documentation  Taken 10/10/2021 0000 by Buck Bowden RN  Activity Management: activity adjusted per tolerance  Taken 10/9/2021 2200 by Buck Bowden RN  Activity Management: activity adjusted per tolerance  Taken 10/9/2021 2100 by Buck Bowden RN  Activity Management: activity adjusted per tolerance  Taken 10/9/2021 2000 by Buck Bowden RN  Activity Management: activity adjusted  per tolerance  Cough And Deep Breathing: done independently per patient  Taken 10/9/2021 1900 by Buck Bowden RN  Activity Management: activity adjusted per tolerance     Problem: Respiratory Compromise (Sepsis/Septic Shock)  Goal: Effective Oxygenation and Ventilation  Intervention: Optimize Oxygenation and Ventilation  Recent Flowsheet Documentation  Taken 10/10/2021 0000 by Buck Bowden RN  Head of Bed (HOB): HOB at 20-30 degrees  Taken 10/9/2021 2200 by Buck Bowden RN  Head of Bed (HOB): HOB at 20-30 degrees  Taken 10/9/2021 2100 by Buck Bowden RN  Head of Bed (HOB): HOB at 20-30 degrees  Taken 10/9/2021 2000 by Buck Bowden RN  Head of Bed (HOB): HOB at 20-30 degrees  Taken 10/9/2021 1900 by Buck Bowden RN  Head of Bed (HOB): HOB at 20-30 degrees     Problem: Diabetic Ketoacidosis  Goal: Fluid and Electrolyte Balance with Absence of Ketosis  Intervention: Monitor and Manage Ketoacidosis  Recent Flowsheet Documentation  Taken 10/9/2021 2000 by Buck Bowden RN  Glycemic Management: blood glucose monitoring  Fluid/Electrolyte Management: fluids provided     Problem: Fall Injury Risk  Goal: Absence of Fall and Fall-Related Injury  Intervention: Identify and Manage Contributors to Fall Injury Risk  Recent Flowsheet Documentation  Taken 10/9/2021 2000 by Buck Bowden RN  Medication Review/Management: medications reviewed     Problem: Fall Injury Risk  Goal: Absence of Fall and Fall-Related Injury  Intervention: Promote Injury-Free Environment  Recent Flowsheet Documentation  Taken 10/10/2021 0000 by Buck Bowden RN  Safety Promotion/Fall Prevention:   assistive device/personal items within reach   clutter free environment maintained   nonskid shoes/slippers when out of bed   safety round/check completed  Taken 10/9/2021 2200 by Buck Bowden RN  Safety Promotion/Fall Prevention:   safety round/check completed   nonskid shoes/slippers when out of bed   clutter free environment  maintained   assistive device/personal items within reach  Taken 10/9/2021 2100 by Buck Bowden, RN  Safety Promotion/Fall Prevention:   assistive device/personal items within reach   clutter free environment maintained   nonskid shoes/slippers when out of bed   safety round/check completed  Taken 10/9/2021 2000 by Buck Bowden RN  Safety Promotion/Fall Prevention:   safety round/check completed   nonskid shoes/slippers when out of bed   clutter free environment maintained   assistive device/personal items within reach  Taken 10/9/2021 1900 by Buck Bowden, RN  Safety Promotion/Fall Prevention:   assistive device/personal items within reach   clutter free environment maintained   nonskid shoes/slippers when out of bed   safety round/check completed     Problem: Diabetes Comorbidity  Goal: Blood Glucose Level Within Desired Range  Intervention: Maintain Glycemic Control  Recent Flowsheet Documentation  Taken 10/9/2021 2000 by Buck Bowden RN  Glycemic Management: blood glucose monitoring     Problem: Hypertension Comorbidity  Goal: Blood Pressure in Desired Range  Intervention: Maintain Hypertension-Management Strategies  Recent Flowsheet Documentation  Taken 10/9/2021 2000 by Buck Bowden RN  Medication Review/Management: medications reviewed     Problem: Restraint, Nonbehavioral (Nonviolent)  Goal: Discontinuation Criteria Achieved  Intervention: Implement Least-restrictive Safety Strategies  Recent Flowsheet Documentation  Taken 10/9/2021 2000 by Buck Bowden RN  Diversional Activities:   smartphone   television     Problem: Restraint, Nonbehavioral (Nonviolent)  Goal: Personal Dignity and Safety Maintained  Intervention: Protect Dignity, Rights, and Personal Wellbeing  Recent Flowsheet Documentation  Taken 10/9/2021 2000 by Buck Bowden RN  Trust Relationship/Rapport:   care explained   questions encouraged     Problem: Restraint, Nonbehavioral (Nonviolent)  Goal: Personal Dignity and  Safety Maintained  Intervention: Protect Skin and Joint Integrity  Recent Flowsheet Documentation  Taken 10/10/2021 0000 by Buck Bowden RN  Body Position: position changed independently  Taken 10/9/2021 2200 by Buck Bowden RN  Body Position: position changed independently  Taken 10/9/2021 2100 by Buck Bowden RN  Body Position: position changed independently  Taken 10/9/2021 2000 by Buck Bowden RN  Body Position: position changed independently  Taken 10/9/2021 1900 by Buck Bowden RN  Body Position: position changed independently     Problem: Skin Injury Risk Increased  Goal: Skin Health and Integrity  Intervention: Optimize Skin Protection  Recent Flowsheet Documentation  Taken 10/10/2021 0000 by Buck Bowden RN  Pressure Reduction Techniques: frequent weight shift encouraged  Head of Bed (HOB): HOB at 20-30 degrees  Pressure Reduction Devices: specialty bed utilized  Taken 10/9/2021 2200 by Buck Bowden RN  Pressure Reduction Techniques: frequent weight shift encouraged  Head of Bed (HOB): HOB at 20-30 degrees  Pressure Reduction Devices: specialty bed utilized  Taken 10/9/2021 2100 by Buck Bowden RN  Pressure Reduction Techniques: frequent weight shift encouraged  Head of Bed (HOB): HOB at 20-30 degrees  Pressure Reduction Devices: specialty bed utilized  Taken 10/9/2021 2000 by Buck Bowden RN  Pressure Reduction Techniques: frequent weight shift encouraged  Head of Bed (HOB): HOB at 20-30 degrees  Pressure Reduction Devices: specialty bed utilized  Taken 10/9/2021 1900 by Bcuk Bowden RN  Pressure Reduction Techniques: frequent weight shift encouraged  Head of Bed (HOB): HOB at 20-30 degrees  Pressure Reduction Devices: specialty bed utilized   Goal Outcome Evaluation:

## 2021-10-11 ENCOUNTER — READMISSION MANAGEMENT (OUTPATIENT)
Dept: CALL CENTER | Facility: HOSPITAL | Age: 31
End: 2021-10-11

## 2021-10-11 VITALS
HEIGHT: 66 IN | BODY MASS INDEX: 37.9 KG/M2 | OXYGEN SATURATION: 95 % | HEART RATE: 90 BPM | RESPIRATION RATE: 18 BRPM | SYSTOLIC BLOOD PRESSURE: 112 MMHG | DIASTOLIC BLOOD PRESSURE: 56 MMHG | WEIGHT: 235.8 LBS | TEMPERATURE: 97.2 F

## 2021-10-11 LAB
ANION GAP SERPL CALCULATED.3IONS-SCNC: 16 MMOL/L (ref 5–15)
BASOPHILS # BLD AUTO: 0.02 10*3/MM3 (ref 0–0.2)
BASOPHILS NFR BLD AUTO: 0.2 % (ref 0–1.5)
BUN SERPL-MCNC: 7 MG/DL (ref 6–20)
BUN/CREAT SERPL: 15.6 (ref 7–25)
CALCIUM SPEC-SCNC: 9.2 MG/DL (ref 8.6–10.5)
CHLORIDE SERPL-SCNC: 104 MMOL/L (ref 98–107)
CO2 SERPL-SCNC: 21 MMOL/L (ref 22–29)
CREAT SERPL-MCNC: 0.45 MG/DL (ref 0.57–1)
DEPRECATED RDW RBC AUTO: 41.9 FL (ref 37–54)
EOSINOPHIL # BLD AUTO: 0.12 10*3/MM3 (ref 0–0.4)
EOSINOPHIL NFR BLD AUTO: 1.4 % (ref 0.3–6.2)
ERYTHROCYTE [DISTWIDTH] IN BLOOD BY AUTOMATED COUNT: 13 % (ref 12.3–15.4)
GAD65 AB SER IA-ACNC: <5 U/ML (ref 0–5)
GFR SERPL CREATININE-BSD FRML MDRD: >150 ML/MIN/1.73
GLUCOSE BLDC GLUCOMTR-MCNC: 152 MG/DL (ref 70–130)
GLUCOSE BLDC GLUCOMTR-MCNC: 153 MG/DL (ref 70–130)
GLUCOSE BLDC GLUCOMTR-MCNC: 161 MG/DL (ref 70–130)
GLUCOSE BLDC GLUCOMTR-MCNC: 234 MG/DL (ref 70–130)
GLUCOSE BLDC GLUCOMTR-MCNC: 242 MG/DL (ref 70–130)
GLUCOSE SERPL-MCNC: 172 MG/DL (ref 65–99)
HCT VFR BLD AUTO: 38.8 % (ref 34–46.6)
HGB BLD-MCNC: 13.2 G/DL (ref 12–15.9)
IMM GRANULOCYTES # BLD AUTO: 0.13 10*3/MM3 (ref 0–0.05)
IMM GRANULOCYTES NFR BLD AUTO: 1.5 % (ref 0–0.5)
LYMPHOCYTES # BLD AUTO: 2.33 10*3/MM3 (ref 0.7–3.1)
LYMPHOCYTES NFR BLD AUTO: 27.6 % (ref 19.6–45.3)
MCH RBC QN AUTO: 30.8 PG (ref 26.6–33)
MCHC RBC AUTO-ENTMCNC: 34 G/DL (ref 31.5–35.7)
MCV RBC AUTO: 90.4 FL (ref 79–97)
MONOCYTES # BLD AUTO: 0.68 10*3/MM3 (ref 0.1–0.9)
MONOCYTES NFR BLD AUTO: 8.1 % (ref 5–12)
NEUTROPHILS NFR BLD AUTO: 5.15 10*3/MM3 (ref 1.7–7)
NEUTROPHILS NFR BLD AUTO: 61.2 % (ref 42.7–76)
NRBC BLD AUTO-RTO: 0 /100 WBC (ref 0–0.2)
PLATELET # BLD AUTO: 128 10*3/MM3 (ref 140–450)
PMV BLD AUTO: 12.7 FL (ref 6–12)
POTASSIUM SERPL-SCNC: 3.5 MMOL/L (ref 3.5–5.2)
RBC # BLD AUTO: 4.29 10*6/MM3 (ref 3.77–5.28)
RBC MORPH BLD: NORMAL
SMALL PLATELETS BLD QL SMEAR: NORMAL
SODIUM SERPL-SCNC: 141 MMOL/L (ref 136–145)
WBC # BLD AUTO: 8.43 10*3/MM3 (ref 3.4–10.8)
WBC MORPH BLD: NORMAL

## 2021-10-11 PROCEDURE — 63710000001 INSULIN ASPART PER 5 UNITS: Performed by: INTERNAL MEDICINE

## 2021-10-11 PROCEDURE — 85007 BL SMEAR W/DIFF WBC COUNT: CPT | Performed by: INTERNAL MEDICINE

## 2021-10-11 PROCEDURE — 85025 COMPLETE CBC W/AUTO DIFF WBC: CPT | Performed by: INTERNAL MEDICINE

## 2021-10-11 PROCEDURE — 82962 GLUCOSE BLOOD TEST: CPT

## 2021-10-11 PROCEDURE — 80048 BASIC METABOLIC PNL TOTAL CA: CPT | Performed by: INTERNAL MEDICINE

## 2021-10-11 PROCEDURE — 63710000001 INSULIN DETEMIR PER 5 UNITS: Performed by: INTERNAL MEDICINE

## 2021-10-11 RX ORDER — INSULIN DETEMIR 100 [IU]/ML
25 INJECTION, SOLUTION SUBCUTANEOUS EVERY MORNING
Qty: 60 ML | Refills: 1 | Status: SHIPPED | OUTPATIENT
Start: 2021-10-12 | End: 2023-02-08

## 2021-10-11 RX ORDER — CEFDINIR 300 MG/1
300 CAPSULE ORAL 2 TIMES DAILY
Qty: 8 CAPSULE | Refills: 0 | Status: SHIPPED | OUTPATIENT
Start: 2021-10-11 | End: 2021-10-15

## 2021-10-11 RX ADMIN — LISINOPRIL 20 MG: 20 TABLET ORAL at 08:46

## 2021-10-11 RX ADMIN — POTASSIUM CHLORIDE 40 MEQ: 750 CAPSULE, EXTENDED RELEASE ORAL at 09:19

## 2021-10-11 RX ADMIN — INSULIN ASPART 2 UNITS: 100 INJECTION, SOLUTION INTRAVENOUS; SUBCUTANEOUS at 02:26

## 2021-10-11 RX ADMIN — INSULIN ASPART 8 UNITS: 100 INJECTION, SOLUTION INTRAVENOUS; SUBCUTANEOUS at 08:45

## 2021-10-11 RX ADMIN — CLONIDINE HYDROCHLORIDE 0.1 MG: 0.1 TABLET ORAL at 08:46

## 2021-10-11 RX ADMIN — ESCITALOPRAM OXALATE 10 MG: 10 TABLET ORAL at 08:46

## 2021-10-11 RX ADMIN — INSULIN ASPART 6 UNITS: 100 INJECTION, SOLUTION INTRAVENOUS; SUBCUTANEOUS at 17:35

## 2021-10-11 RX ADMIN — INSULIN DETEMIR 20 UNITS: 100 INJECTION, SOLUTION SUBCUTANEOUS at 08:51

## 2021-10-11 RX ADMIN — SODIUM CHLORIDE, PRESERVATIVE FREE 10 ML: 5 INJECTION INTRAVENOUS at 08:47

## 2021-10-11 RX ADMIN — POTASSIUM CHLORIDE 40 MEQ: 750 CAPSULE, EXTENDED RELEASE ORAL at 15:27

## 2021-10-11 RX ADMIN — INSULIN ASPART 2 UNITS: 100 INJECTION, SOLUTION INTRAVENOUS; SUBCUTANEOUS at 08:41

## 2021-10-11 RX ADMIN — INSULIN ASPART 6 UNITS: 100 INJECTION, SOLUTION INTRAVENOUS; SUBCUTANEOUS at 11:39

## 2021-10-11 NOTE — PLAN OF CARE
Problem: Adult Inpatient Plan of Care  Goal: Absence of Hospital-Acquired Illness or Injury  Intervention: Identify and Manage Fall Risk  Recent Flowsheet Documentation  Taken 10/11/2021 0000 by Buck Bowden RN  Safety Promotion/Fall Prevention:   assistive device/personal items within reach   clutter free environment maintained   nonskid shoes/slippers when out of bed   safety round/check completed  Taken 10/10/2021 2200 by Buck Bowden RN  Safety Promotion/Fall Prevention:   safety round/check completed   nonskid shoes/slippers when out of bed   clutter free environment maintained   assistive device/personal items within reach  Taken 10/10/2021 2100 by Buck Bowden RN  Safety Promotion/Fall Prevention:   assistive device/personal items within reach   clutter free environment maintained   nonskid shoes/slippers when out of bed   safety round/check completed  Taken 10/10/2021 2000 by Buck Bowden RN  Safety Promotion/Fall Prevention:   safety round/check completed   nonskid shoes/slippers when out of bed   clutter free environment maintained   assistive device/personal items within reach  Taken 10/10/2021 1900 by Buck Bowden RN  Safety Promotion/Fall Prevention:   assistive device/personal items within reach   clutter free environment maintained   nonskid shoes/slippers when out of bed   safety round/check completed     Problem: Adult Inpatient Plan of Care  Goal: Absence of Hospital-Acquired Illness or Injury  Intervention: Prevent Skin Injury  Recent Flowsheet Documentation  Taken 10/11/2021 0000 by Bukc Bowden RN  Body Position: position changed independently  Taken 10/10/2021 2200 by Buck Bowden RN  Body Position: position changed independently  Taken 10/10/2021 2100 by Buck Bowden RN  Body Position: position changed independently  Taken 10/10/2021 2000 by Buck Bowden RN  Body Position: position changed independently  Taken 10/10/2021 1900 by Buck Bowden RN  Body  Position: position changed independently     Problem: Adult Inpatient Plan of Care  Goal: Absence of Hospital-Acquired Illness or Injury  Intervention: Prevent Infection  Recent Flowsheet Documentation  Taken 10/11/2021 0000 by Buck Bowden RN  Infection Prevention: rest/sleep promoted  Taken 10/10/2021 2200 by Buck Bowden RN  Infection Prevention: rest/sleep promoted  Taken 10/10/2021 2100 by Buck Bowden RN  Infection Prevention: rest/sleep promoted  Taken 10/10/2021 2000 by Buck Bowden RN  Infection Prevention: rest/sleep promoted  Taken 10/10/2021 1900 by Buck Bowden RN  Infection Prevention: rest/sleep promoted     Problem: Adult Inpatient Plan of Care  Goal: Optimal Comfort and Wellbeing  Intervention: Provide Person-Centered Care  Recent Flowsheet Documentation  Taken 10/10/2021 2000 by Buck Bowden RN  Trust Relationship/Rapport:   care explained   questions encouraged     Problem: Adjustment to Illness (Sepsis/Septic Shock)  Goal: Optimal Coping  Intervention: Optimize Psychosocial Adjustment to Illness  Recent Flowsheet Documentation  Taken 10/10/2021 2000 by Buck Bowden RN  Supportive Measures: active listening utilized     Problem: Glycemic Control Impaired (Sepsis/Septic Shock)  Goal: Blood Glucose Level Within Desired Range  Intervention: Optimize Glycemic Control  Recent Flowsheet Documentation  Taken 10/10/2021 2000 by Buck Bowden RN  Glycemic Management: blood glucose monitoring     Problem: Hemodynamic Instability (Sepsis/Septic Shock)  Goal: Effective Tissue Perfusion  Intervention: Optimize Blood Flow  Recent Flowsheet Documentation  Taken 10/10/2021 2000 by Buck Bowden RN  Fluid/Electrolyte Management: fluids provided     Problem: Infection (Sepsis/Septic Shock)  Goal: Absence of Infection Signs and Symptoms  Intervention: Prevent or Manage Infection Progression  Recent Flowsheet Documentation  Taken 10/11/2021 0000 by Buck Bowden RN  Infection  Prevention: rest/sleep promoted  Taken 10/10/2021 2200 by Buck Bowden RN  Infection Prevention: rest/sleep promoted  Taken 10/10/2021 2100 by Buck Bowden RN  Infection Prevention: rest/sleep promoted  Taken 10/10/2021 2000 by Buck Bowden RN  Infection Prevention: rest/sleep promoted  Taken 10/10/2021 1900 by Buck Bowden RN  Infection Prevention: rest/sleep promoted     Problem: Nutrition Impaired (Sepsis/Septic Shock)  Goal: Optimal Nutrition Intake  Intervention: Promote and Optimize Nutrition Delivery  Recent Flowsheet Documentation  Taken 10/10/2021 2000 by Buck Bowden RN  Nutrition Support Management: weight trending reviewed     Problem: Respiratory Compromise (Sepsis/Septic Shock)  Goal: Effective Oxygenation and Ventilation  Intervention: Promote Airway Secretion Clearance  Recent Flowsheet Documentation  Taken 10/11/2021 0000 by Buck Bowden RN  Activity Management: activity adjusted per tolerance  Taken 10/10/2021 2200 by Buck Bowden RN  Activity Management: activity adjusted per tolerance  Taken 10/10/2021 2100 by Buck Bowden RN  Activity Management: activity adjusted per tolerance  Taken 10/10/2021 2000 by Buck Bowden RN  Activity Management: activity adjusted per tolerance  Cough And Deep Breathing: done independently per patient  Taken 10/10/2021 1900 by Buck Bowden RN  Activity Management: activity adjusted per tolerance     Problem: Respiratory Compromise (Sepsis/Septic Shock)  Goal: Effective Oxygenation and Ventilation  Intervention: Optimize Oxygenation and Ventilation  Recent Flowsheet Documentation  Taken 10/11/2021 0000 by Buck Bowden RN  Head of Bed (HOB): HOB at 20-30 degrees  Taken 10/10/2021 2200 by Buck Bowden RN  Head of Bed (HOB): HOB at 20-30 degrees  Taken 10/10/2021 2100 by Buck Bowedn RN  Head of Bed (HOB): HOB at 20-30 degrees  Taken 10/10/2021 2000 by Buck Bowden RN  Head of Bed (HOB): HOB at 20-30  degrees  Airway/Ventilation Management: airway patency maintained  Taken 10/10/2021 1900 by Buck Bowden RN  Head of Bed (HOB): HOB at 20-30 degrees     Problem: Diabetic Ketoacidosis  Goal: Fluid and Electrolyte Balance with Absence of Ketosis  Intervention: Monitor and Manage Ketoacidosis  Recent Flowsheet Documentation  Taken 10/10/2021 2000 by Buck Bowden RN  Glycemic Management: blood glucose monitoring  Fluid/Electrolyte Management: fluids provided     Problem: Fall Injury Risk  Goal: Absence of Fall and Fall-Related Injury  Intervention: Promote Injury-Free Environment  Recent Flowsheet Documentation  Taken 10/11/2021 0000 by Buck Bowden RN  Safety Promotion/Fall Prevention:   assistive device/personal items within reach   clutter free environment maintained   nonskid shoes/slippers when out of bed   safety round/check completed  Taken 10/10/2021 2200 by Buck Bowden RN  Safety Promotion/Fall Prevention:   safety round/check completed   nonskid shoes/slippers when out of bed   clutter free environment maintained   assistive device/personal items within reach  Taken 10/10/2021 2100 by Buck Bowden RN  Safety Promotion/Fall Prevention:   assistive device/personal items within reach   clutter free environment maintained   nonskid shoes/slippers when out of bed   safety round/check completed  Taken 10/10/2021 2000 by Buck Bowden RN  Safety Promotion/Fall Prevention:   safety round/check completed   nonskid shoes/slippers when out of bed   clutter free environment maintained   assistive device/personal items within reach  Taken 10/10/2021 1900 by Buck Bowden RN  Safety Promotion/Fall Prevention:   assistive device/personal items within reach   clutter free environment maintained   nonskid shoes/slippers when out of bed   safety round/check completed     Problem: Diabetes Comorbidity  Goal: Blood Glucose Level Within Desired Range  Intervention: Maintain Glycemic Control  Recent  Flowsheet Documentation  Taken 10/10/2021 2000 by Buck Bowden RN  Glycemic Management: blood glucose monitoring     Problem: Restraint, Nonbehavioral (Nonviolent)  Goal: Discontinuation Criteria Achieved  Intervention: Implement Least-restrictive Safety Strategies  Recent Flowsheet Documentation  Taken 10/10/2021 2000 by Buck Bowden RN  Diversional Activities:   smartphone   television     Problem: Restraint, Nonbehavioral (Nonviolent)  Goal: Personal Dignity and Safety Maintained  Intervention: Protect Dignity, Rights, and Personal Wellbeing  Recent Flowsheet Documentation  Taken 10/10/2021 2000 by Buck Bowden RN  Trust Relationship/Rapport:   care explained   questions encouraged     Problem: Restraint, Nonbehavioral (Nonviolent)  Goal: Personal Dignity and Safety Maintained  Intervention: Protect Skin and Joint Integrity  Recent Flowsheet Documentation  Taken 10/11/2021 0000 by Buck Bowden RN  Body Position: position changed independently  Taken 10/10/2021 2200 by Buck Bowden RN  Body Position: position changed independently  Taken 10/10/2021 2100 by Buck Bowden RN  Body Position: position changed independently  Taken 10/10/2021 2000 by Buck Bowden RN  Body Position: position changed independently  Taken 10/10/2021 1900 by Buck Bowden RN  Body Position: position changed independently     Problem: Skin Injury Risk Increased  Goal: Skin Health and Integrity  Intervention: Optimize Skin Protection  Recent Flowsheet Documentation  Taken 10/11/2021 0000 by Buck Bowden RN  Pressure Reduction Techniques: frequent weight shift encouraged  Head of Bed (HOB): HOB at 20-30 degrees  Pressure Reduction Devices: specialty bed utilized  Taken 10/10/2021 2200 by Buck Bowden RN  Pressure Reduction Techniques: frequent weight shift encouraged  Head of Bed (HOB): HOB at 20-30 degrees  Pressure Reduction Devices: specialty bed utilized  Taken 10/10/2021 2100 by Buck Bowden  RN  Pressure Reduction Techniques: frequent weight shift encouraged  Head of Bed (HOB): HOB at 20-30 degrees  Pressure Reduction Devices: specialty bed utilized  Taken 10/10/2021 2000 by Buck Bowden RN  Pressure Reduction Techniques: frequent weight shift encouraged  Head of Bed (HOB): HOB at 20-30 degrees  Pressure Reduction Devices: specialty bed utilized  Taken 10/10/2021 1900 by Buck Bowden RN  Pressure Reduction Techniques: frequent weight shift encouraged  Head of Bed (HOB): HOB at 20-30 degrees  Pressure Reduction Devices: specialty bed utilized   Goal Outcome Evaluation:

## 2021-10-11 NOTE — PLAN OF CARE
Goal Outcome Evaluation:               Pt still unsure about counting carbs to manage blood sugar and insulin doses. Vital signs stable. Pt is being discharged today.

## 2021-10-11 NOTE — CONSULTS
Adult Nutrition  Assessment    Patient Name:  Emili Caal  YOB: 1990  MRN: [id number]  Admit Date:  10/4/2021    Assessment Date:  10/11/2021    Comments:  Pt is alert and oriented and taking po.  She reports that her vision is very blurry and she is having difficulty seeing.  She reports that she did not take her diabetes very seriously but she plans on doing better.  She would drink regular sodas all day and they go out and get her supper.  She knows that she eats too much and portion sizes is a problems for her.  She was not checking her blood sugars at home.  She lives alone and doesn't do much cooking.  Pt states that she did not understand the carb and food concept but per [facility] staff that has been changed and she will go home on 6 units of insulin before each meal and Levemir 25 mg in the morning.  She also drinks a lot of milk and juice.  Pt does appear to have limited ability to understand the concept of carb counting.  RAMIRO set several goals for pt and she was able to repeat them back to me.    Goal 1:  Avoid regular sodas --try Dr Pepper zero.  (her favorite drink was Dr. Pepper)  Goal 2: Eat 3 meals a day.  This is something pt was not accustomized to doing  Goal 3: Be aware of portion sizes.    Rd did discuss with pt the Carbohydrates and what foods/Beverages contained carbs.  WE also discussed non-starchy foods and the fact that they have very few carbs in them and they are low in calories.    Goal 4: Healthy weight loss and diet modifications and exercise.    I feel that pt will need more education because I am concerned about her ability to retain the information we have discussed.     has set up Home Health and her appt with Dr. Jb Fay.  She will receive further education when she goes for her appt.  RD offered encouragement to pt.    Spoke with nsg and  regarding my concerns for the need for further education and the fact that she is having difficult  "with her vision and therefore I am concerned about her ability to give herself inulin and check her blood sugars.    Diet copies and contact number provided.     Reason for Assessment     Row Name 10/11/21 1305          Reason for Assessment    Reason For Assessment follow-up protocol     Identified At Risk by Screening Criteria need for education                Nutrition/Diet History     Row Name 10/11/21 1305          Nutrition/Diet History    Typical Food/Fluid Intake Pt is alert and taking po.  Pt reports that she does not understand the carb and insulin ratio.  \"I just can't do that\"  She did say that she could check her blood sugars and give herself insulin.  She has been trying to lose wt but she hasn't been taking her diabetes serious.  \"but I will\"  She lives alone and typically drinks regular sodas all day and then eats supper.  She understands the need to make needed lifestyle changes.                  Labs/Tests/Procedures/Meds     Row Name 10/11/21 1307          Labs/Procedures/Meds    Lab Results Reviewed reviewed, pertinent     Lab Results Comments Gluc 236/234/153/242; Creat 0.45; Plateltes 128            Diagnostic Tests/Procedures    Diagnostic Test/Procedure Reviewed reviewed, pertinent     Diagnostic Test/Procedures Comments Insulin regimen; Education            Medications    Pertinent Medications Reviewed reviewed, pertinent     Pertinent Medications Comments Novolog 6 units tid with each meal; Levemir 25 units every am                Physical Findings     Row Name 10/11/21 1313          Physical Findings    Overall Physical Appearance overweight                  Nutrition Prescription Ordered     Row Name 10/11/21 1313          Nutrition Prescription PO    Current PO Diet Regular     Common Modifiers Consistent Carbohydrate                Evaluation of Received Nutrient/Fluid Intake     Row Name 10/11/21 1313          PO Evaluation    Number of Days PO Intake Evaluated Insufficient Data     " Number of Meals 1     % PO Intake 75%                     Electronically signed by:  Janna Harrison, RAMIRO  10/11/21 13:27 CDT

## 2021-10-11 NOTE — DISCHARGE PLACEMENT REQUEST
"Emili Bose (30 y.o. Female)     Phone [phone]  Fax [phone]            Date of Birth Social Security Number Address Home Phone MRN    1990  [address] [facility] [address] [phone] [id number]    Muslim Marital Status             [facility] Single       Admission Date Admission Type Admitting Provider Attending Provider Department, Room/Bed    10/4/21 Urgent Raj Quinones MD Ebenibo, Sotonte E, MD [address], 305[address]    Discharge Date Discharge Disposition Discharge Destination           Home or Self Care              Attending Provider: Raj Quinones MD    Allergies: Hydroxyquinolines    Isolation: None   Infection: None   Code Status: CPR   Advance Care Planning Activity    Ht: 167.6 cm (66\")   Wt: 107 kg (235 lb 12.8 oz)    Admission Cmt: None   Principal Problem: DKA (diabetic ketoacidosis) (HCC) [E11.10]                 Active Insurance as of 10/4/2021     Primary Coverage     Payor Plan Insurance Group Employer/Plan Group    [employer] [facility] [employer] [place]      Payor Plan Address Payor Plan Phone Number Payor Plan Fax Number Effective Dates    [address] [address]   2018 - None Entered    [place] [address]       Subscriber Name Subscriber Birth Date Member ID       EMILI BOSE 1990 [id number]                 Emergency Contacts      (Rel.) Home Phone Work Phone Mobile Phone    JAZMYN BOSE (Sister) [phone] -- [phone]        [address] [address]  Phone:  [phone]  Fax:  [phone] Date: Oct 11, 2021      Ambulatory Referral to Home Health     Patient:  Emili Bose MRN:  [id number]   [address] [facility] [address] :  1990  SSN:    Phone: [phone] Sex:  F      INSURANCE PAYOR PLAN GROUP # SUBSCRIBER ID   Primary:    [employer] [place] [id number]   [id number] "      Referring Provider Information:  VINI BROCK Phone: [phone] Fax: [phone]      Referral Information:   # Visits:  999 Referral Type: Home Health [42]   Urgency:  Routine Referral Reason: Specialty Services Required   Start Date: Oct 11, 2021 End Date:  To be determined by Insurer   Diagnosis: Diabetic ketoacidosis without coma associated with type 2 diabetes mellitus (HCC) (E11.10 [ICD-10-CM] 250.12 [ICD-9-CM])      Refer to Dept:   Refer to Provider:   Refer to Facility:       Face to Face Visit Date: 10/11/2021  Follow-up provider for Plan of Care? I treated the patient in an acute care facility and will not continue treatment after discharge.  Follow-up provider: JORGE JOHNS [[id number]]  Reason/Clinical Findings: DKA  Describe mobility limitations that make leaving home difficult: DKA  Nursing/Therapeutic Services Requested: Skilled Nursing  Skilled nursing orders: Medication education  Frequency: 1 Week 1     This document serves as a request of services and does not constitute Insurance authorization or approval of services.  To determine eligibility, please contact the members Insurance carrier to verify and review coverage.     If you have medical questions regarding this request for services. Please contact [address] at [phone] during normal business hours.         Authorizing Provider:Vini Brock MD  Authorizing Provider's NPI: [id number]  Order Entered By: Vini Brock MD 10/11/2021 12:19 PM     Electronically signed by: Vini Brock MD 10/11/2021 12:19 PM               History & Physical      Raj Quinones MD at 10/04/21 1647                [facility]  INPATIENT HISTORY AND PHYSICAL       Patient Care Team:  Bj Duckworth MD as PCP - General (Pulmonary Disease)      Date of Admission: 10/4/2021      Chief complaint Dysuria, abdominal pain and  confusion    Subjective     Patient is a 30 y.o. female with a past medical history of type 2 diabetes mellitus with noncompliance, essential hypertension, anxiety and PTSD.  She presents with complaints of dysuria, abdominal pain and vomiting of 2 days duration along with worsening confusion today.    Patient states she has been noncompliant with her diabetic medication of Steglatro recently.  Secondary stop taking it due to some abdominal pain and discomfort she was feeling.  Her abdominal pain, nausea and vomiting worsened over the last 2 days and she had some dysuria as well.  She denies fevers or chills.  However due to her worsening symptoms and confusion she presented to [facility] today.  She has some tachycardia.  If venous blood gas sample was consistent with acidosis with pH of 6.87 and bicarb of 4.  Urinalysis had ketones and a BMP was consistent with DKA with bicarb of 6.  Chest x-ray did not show any acute abnormalities and rapid Covid screen was negative.  She was sent to [facility] for further evaluation.    At the time of my review patient is alert and oriented x3 but lethargic.  She complains of some abdominal pain that is vague and generalized as well as dysuria.    Review of Systems   Constitutional: Negative for activity change, appetite change, chills, fatigue and fever.   HENT: Negative for congestion, ear pain, rhinorrhea, sore throat and trouble swallowing.    Respiratory: Negative for cough, chest tightness, shortness of breath and wheezing.    Cardiovascular: Negative for chest pain, palpitations and leg swelling.   Gastrointestinal: Positive for abdominal pain, nausea and vomiting. Negative for abdominal distention and diarrhea.   Genitourinary: Positive for dysuria. Negative for difficulty urinating and hematuria.   Musculoskeletal: Negative for arthralgias, back pain and myalgias.   Skin: Negative for pallor and rash.   Neurological: Positive for  weakness. Negative for dizziness, syncope, light-headedness and headaches.   Hematological: Negative for adenopathy. Does not bruise/bleed easily.   Psychiatric/Behavioral: Positive for decreased concentration. Negative for agitation and confusion. The patient is not nervous/anxious.      History  Past Medical History:   Diagnosis Date   • Allergic    • Anxiety    • Asthma    • Chronic diarrhea    • Depression    • Diabetes mellitus (HCC)    • High blood pressure    • Hyperlipidemia    • Liver disease    • PTSD (post-traumatic stress disorder)      History reviewed. No pertinent surgical history.  Family History   Problem Relation Age of Onset   • Liver disease Mother    • Liver disease Father      Social History     Tobacco Use   • Smoking status: Current Every Day Smoker     Packs/day: 0.50     Types: Cigarettes   • Smokeless tobacco: Never Used   Vaping Use   • Vaping Use: Former   Substance Use Topics   • Alcohol use: Never   • Drug use: Not Currently     Medications Prior to Admission   Medication Sig Dispense Refill Last Dose   • cloNIDine (CATAPRES) 0.1 MG tablet Take 0.1 mg by mouth Daily.      • cyclobenzaprine (FLEXERIL) 10 MG tablet Take 10 mg by mouth Daily.      • dicyclomine (BENTYL) 20 MG tablet Take 20 mg by mouth Every 6 (Six) Hours.      • Ertugliflozin L-PyroglutamicAc (Steglatro) 15 MG tablet Take 15 mg by mouth Every Morning.      • escitalopram (LEXAPRO) 10 MG tablet Take 10 mg by mouth Daily.      • lisinopril (PRINIVIL,ZESTRIL) 20 MG tablet Take 20 mg by mouth Daily.      • simvastatin (ZOCOR) 20 MG tablet Take 20 mg by mouth Every Night.        Allergies:  Hydroxyquinolines  Prior to Admission medications    Medication Sig Start Date End Date Taking? Authorizing Provider   cloNIDine (CATAPRES) 0.1 MG tablet Take 0.1 mg by mouth Daily.   Yes Provider, MD Nate   cyclobenzaprine (FLEXERIL) 10 MG tablet Take 10 mg by mouth Daily.   Yes Provider, MD Nate   dicyclomine (BENTYL) 20 MG  tablet Take 20 mg by mouth Every 6 (Six) Hours.   Yes Nate Domingo MD   Ertugliflozin L-PyroglutamicAc (Steglatro) 15 MG tablet Take 15 mg by mouth Every Morning.   Yes Nate Domingo MD   escitalopram (LEXAPRO) 10 MG tablet Take 10 mg by mouth Daily.   Yes Nate Domingo MD   lisinopril (PRINIVIL,ZESTRIL) 20 MG tablet Take 20 mg by mouth Daily.   Yes Nate Domingo MD   simvastatin (ZOCOR) 20 MG tablet Take 20 mg by mouth Every Night.   Yes Nate Domingo MD       I have reviewed the patient's current medications    Objective        Vital Signs  Temp:  [93.3 °F (34.1 °C)-95.9 °F (35.5 °C)] 95.9 °F (35.5 °C)  Heart Rate:  [106-121] 119  Resp:  [22] 22  BP: (125-149)/(65-80) 145/67      Physical Exam  Constitutional:       General: She is not in acute distress.     Appearance: She is ill-appearing. She is not diaphoretic.   HENT:      Head: Normocephalic and atraumatic.      Right Ear: External ear normal.      Left Ear: External ear normal.      Nose: No congestion or rhinorrhea.      Mouth/Throat:      Mouth: Mucous membranes are dry.      Pharynx: No oropharyngeal exudate or posterior oropharyngeal erythema.   Eyes:      General: No scleral icterus.     Extraocular Movements: Extraocular movements intact.      Conjunctiva/sclera: Conjunctivae normal.   Cardiovascular:      Rate and Rhythm: Normal rate and regular rhythm.      Heart sounds: Normal heart sounds. No murmur heard.     Pulmonary:      Effort: Pulmonary effort is normal. No respiratory distress.      Breath sounds: Normal breath sounds. No wheezing, rhonchi or rales.   Abdominal:      General: Abdomen is flat. There is no distension.      Palpations: Abdomen is soft.      Tenderness: There is abdominal tenderness. There is no guarding.      Comments: Vague generalized tenderness.   Musculoskeletal:         General: No swelling, tenderness or deformity.      Cervical back: Neck supple. No rigidity. No muscular  tenderness.      Right lower leg: No edema.      Left lower leg: No edema.   Lymphadenopathy:      Cervical: No cervical adenopathy.   Skin:     General: Skin is warm and dry.   Neurological:      General: No focal deficit present.      Mental Status: She is alert and oriented to person, place, and time.      Cranial Nerves: No cranial nerve deficit.      Motor: No weakness.      Comments: Drowsy and lethargic   Psychiatric:         Mood and Affect: Mood normal.         Behavior: Behavior normal.         Thought Content: Thought content normal.       Results Review:     Results from last 7 days   Lab Units 10/04/21  2007 10/04/21  1803   SODIUM mmol/L 142 137   POTASSIUM mmol/L 4.8 5.2   CHLORIDE mmol/L 113* 106   CO2 mmol/L 2.0* 4.0*   BUN mg/dL 17 18   CREATININE mg/dL 1.02* 1.16*   GLUCOSE mg/dL 214* 318*   CALCIUM mg/dL 9.3 9.5   BILIRUBIN mg/dL 0.2  --    ALK PHOS U/L 155*  --    ALT (SGPT) U/L 20  --    AST (SGOT) U/L 22  --        Results from last 7 days   Lab Units 10/04/21  2007 10/04/21  1803   MAGNESIUM mg/dL 2.4 2.8*   PHOSPHORUS mg/dL 2.0* 2.8       Results from last 7 days   Lab Units 10/04/21  1800   WBC 10*3/mm3 30.01*   HEMOGLOBIN g/dL 17.1*   HEMATOCRIT % 51.0*   PLATELETS 10*3/mm3 265           Imaging Results (Last 7 Days)     ** No results found for the last 168 hours. **          Assessment / Plan       Hospital Problem List:  Principal Problem:    DKA (diabetic ketoacidosis) ([facility])  Active Problems:    Acute UTI (urinary tract infection)    Sepsis, unspecified organism ([facility])  Essential hypertension  Metabolic acidosis    Plan  -Patient has DKA which is likely due to combination of urinary tract infection, sepsis and SGLT2 inhibitor  -We will start patient on IV fluids and IV insulin as per DKA protocol  -BMP every 4 hours and monitor for closure of anion gap  -N.p.o. except for sips with meds.  Okay for patient to have pure water  -Replete electrolytes  -We will start IV antibiotics with  ceftriaxone for acute cystitis  -Follow-up blood cultures  -Hold home antihypertensive medications for now  -DVT prophylaxis with subcu Lovenox  -CODE STATUS is full code    33 minutes of critical care time was spent evaluating patient, reviewing documentation and planning treatment.    I discussed the patient's findings and my recommendations with Patient.    I have utilized all available immediate resources to obtain, update, or review the patient's current medications.      I confirmed that the patient's Advance Care Plan is present, code status is documented, or surrogate decision maker is listed in the patient's medical record.      Raj Quinones MD  10/04/21  22:45 CDT        Part of this note may be an electronic transcription/translation of spoken language to printed text using the Dragon Dictation System.           Electronically signed by Raj Quinones MD at 10/04/21 2931          Physician Progress Notes (last 24 hours)      Raj Quinones MD at 10/10/21 1456              [facility]  INPATIENT PROGRESS NOTE    Length of Stay: 6  Date of Admission: 10/4/2021  Primary Care Physician: Bj Duckworth MD    Subjective     Chief Complaint: Dysuria, abdominal pain, confusion    HPI: Patient feels much improved today.  She is oriented x3.  She denies abdominal pain.    Review of Systems   Constitutional: Negative for activity change, appetite change, chills, fatigue and fever.   HENT: Negative for congestion, ear pain, rhinorrhea, sore throat and trouble swallowing.    Respiratory: Negative for cough, chest tightness, shortness of breath and wheezing.    Cardiovascular: Negative for chest pain, palpitations and leg swelling.   Gastrointestinal: Positive for diarrhea. Negative for abdominal distention, abdominal pain, nausea and vomiting.   Genitourinary: Negative for difficulty urinating, dysuria and hematuria.   Musculoskeletal: Negative  for arthralgias, back pain and myalgias.   Skin: Negative for pallor and rash.   Neurological: Negative for dizziness, syncope, weakness, light-headedness and headaches.   Hematological: Negative for adenopathy. Does not bruise/bleed easily.   Psychiatric/Behavioral: Negative for agitation and confusion. The patient is not nervous/anxious.      Objective    Temp:  [97 °F (36.1 °C)-97.6 °F (36.4 °C)] 97 °F (36.1 °C)  Heart Rate:  [] 95  Resp:  [18] 18  BP: (107-143)/(54-85) 107/54    Physical Exam  Constitutional:       General: She is not in acute distress.     Appearance: Normal appearance. She is obese. She is not ill-appearing or diaphoretic.   HENT:      Head: Normocephalic and atraumatic.      Right Ear: External ear normal.      Left Ear: External ear normal.      Nose: No congestion or rhinorrhea.      Mouth/Throat:      Mouth: Mucous membranes are moist.      Pharynx: No oropharyngeal exudate or posterior oropharyngeal erythema.   Eyes:      General: No scleral icterus.     Extraocular Movements: Extraocular movements intact.      Conjunctiva/sclera: Conjunctivae normal.   Cardiovascular:      Rate and Rhythm: Normal rate and regular rhythm.      Heart sounds: Normal heart sounds. No murmur heard.      Pulmonary:      Effort: Pulmonary effort is normal. No respiratory distress.      Breath sounds: Normal breath sounds. No wheezing, rhonchi or rales.   Abdominal:      General: Abdomen is flat. There is no distension.      Palpations: Abdomen is soft.      Tenderness: There is no abdominal tenderness. There is no guarding.   Musculoskeletal:         General: No swelling, tenderness or deformity.      Cervical back: Neck supple. No rigidity. No muscular tenderness.      Right lower leg: No edema.      Left lower leg: No edema.   Lymphadenopathy:      Cervical: No cervical adenopathy.   Skin:     General: Skin is warm and dry.   Neurological:      General: No focal deficit present.      Mental Status: She  is alert and oriented to person, place, and time.      Cranial Nerves: No cranial nerve deficit.      Motor: No weakness.   Psychiatric:         Mood and Affect: Mood normal.         Behavior: Behavior normal.         Thought Content: Thought content normal.       Medication Review:    Current Facility-Administered Medications:   •  acetaminophen (TYLENOL) tablet 650 mg, 650 mg, Oral, Q4H PRN, Raj Quinones MD  •  cefTRIAXone (ROCEPHIN) 2 g/100 mL 0.9% NS IVPB (MBP), 2 g, Intravenous, Q24H, Raj Quinones MD, 2 g at 10/09/21 1823  •  cloNIDine (CATAPRES) tablet 0.1 mg, 0.1 mg, Oral, Daily, Raj Quinones MD, 0.1 mg at 10/10/21 0849  •  dicyclomine (BENTYL) capsule 20 mg, 20 mg, Oral, 4x Daily PRN, Raj Quinones MD  •  enoxaparin (LOVENOX) syringe 40 mg, 40 mg, Subcutaneous, Q24H, Raj Quinones MD, 40 mg at 10/08/21 1740  •  escitalopram (LEXAPRO) tablet 10 mg, 10 mg, Oral, Daily, Raj Quinones MD, 10 mg at 10/10/21 0849  •  insulin aspart (novoLOG) injection 2-10 Units, 2-10 Units, Subcutaneous, TID With Meals, Raj Quinones MD, 2 Units at 10/10/21 1206  •  insulin aspart (novoLOG) injection 2-8 Units, 2-8 Units, Subcutaneous, 4x Daily, Raj Quinones MD, 4 Units at 10/10/21 1207  •  insulin aspart (novoLOG) injection 2-8 Units, 2-8 Units, Subcutaneous, Q24H, Raj Quinones MD, 2 Units at 10/09/21 0347  •  insulin detemir (LEVEMIR) injection 20 Units, 20 Units, Subcutaneous, Q12H, Raj Quinones MD, 20 Units at 10/10/21 0849  •  lisinopril (PRINIVIL,ZESTRIL) tablet 20 mg, 20 mg, Oral, Q24H, Raj Quinones MD, 20 mg at 10/10/21 0849  •  loperamide (IMODIUM) capsule 2 mg, 2 mg, Oral, 4x Daily PRN, Raj Quinones MD  •  LORazepam (ATIVAN) injection 1 mg, 1 mg, Intravenous, Q4H PRN, Raj Quinones MD, 1 mg at 10/07/21 2009  •  Magnesium Sulfate 2 gram Bolus, followed by 8 gram infusion (total Mg dose 10 grams)- Mg less than or equal to 1mg/dL, 2 g,  Intravenous, PRN **OR** Magnesium Sulfate 2 gram / 50mL Infusion (GIVE X 3 BAGS TO EQUAL 6GM TOTAL DOSE) - Mg 1.1 - 1.5 mg/dl, 2 g, Intravenous, PRN **OR** Magnesium Sulfate 4 gram infusion- Mg 1.6-1.9 mg/dL, 4 g, Intravenous, PRN, Raj Quinones MD  •  morphine injection 2 mg, 2 mg, Intravenous, Q2H PRN, 2 mg at 10/07/21 2212 **AND** naloxone (NARCAN) injection 0.4 mg, 0.4 mg, Intravenous, Q5 Min PRN, Raj Quinones MD  •  ondansetron (ZOFRAN) injection 4 mg, 4 mg, Intravenous, Q6H PRN, Raj Quinones MD  •  potassium chloride (MICRO-K) CR capsule 40 mEq, 40 mEq, Oral, PRN, 40 mEq at 10/10/21 1210 **OR** potassium chloride (KLOR-CON) packet 40 mEq, 40 mEq, Oral, PRN **OR** potassium chloride 10 mEq in 100 mL IVPB, 10 mEq, Intravenous, Q1H PRN, Raj Quinones MD, Last Rate: 100 mL/hr at 10/08/21 1738, 10 mEq at 10/08/21 1738  •  potassium phosphate 45 mmol in sodium chloride 0.9 % 500 mL infusion, 45 mmol, Intravenous, PRN, Last Rate: 62.5 mL/hr at 10/05/21 1345, 45 mmol at 10/05/21 1345 **OR** potassium phosphate 30 mmol in sodium chloride 0.9 % 250 mL infusion, 30 mmol, Intravenous, PRN, Last Rate: 31.3 mL/hr at 10/06/21 2154, 30 mmol at 10/06/21 2154 **OR** Potassium Phosphates 15 mmol in sodium chloride 0.9 % 100 mL infusion, 15 mmol, Intravenous, PRN, 15 mmol at 10/07/21 1706 **OR** sodium phosphates 45 mmol in sodium chloride 0.9 % 500 mL IVPB, 45 mmol, Intravenous, PRN **OR** sodium phosphates 30 mmol in sodium chloride 0.9 % 250 mL IVPB, 30 mmol, Intravenous, PRN **OR** sodium phosphates 15 mmol in sodium chloride 0.9 % 250 mL IVPB, 15 mmol, Intravenous, PRN, Raj Quinones MD  •  sodium chloride 0.45 % with KCl 20 mEq/L infusion, 250 mL/hr, Intravenous, Continuous PRN, Raj Quinones MD, Stopped at 10/04/21 6972  •  sodium chloride 0.9 % flush 10 mL, 10 mL, Intravenous, PRN, Raj Quinones MD  •  sodium chloride 0.9 % flush 10 mL, 10 mL, Intravenous, Q12H, Raj Quinones  MD ISI, 10 mL at 10/09/21 2050  •  sodium chloride 0.9 % flush 10 mL, 10 mL, Intravenous, PRN, Raj Quinones MD    I have reviewed the patient's current medications.     Results Review:  I have reviewed the labs, radiology results, and diagnostic studies.    Laboratory Data:   Results from last 7 days   Lab Units 10/10/21  0659 10/09/21  1126 10/09/21  0450 10/04/21  2359 10/04/21  2007   SODIUM mmol/L 144 142 145   < > 142   POTASSIUM mmol/L 3.2* 3.6 3.8   < > 4.8   CHLORIDE mmol/L 107 105 107   < > 113*   CO2 mmol/L 22.0 18.0* 20.0*   < > 2.0*   BUN mg/dL 7 6 5*   < > 17   CREATININE mg/dL 0.41* 0.50* 0.44*   < > 1.02*   GLUCOSE mg/dL 130* 241* 142*   < > 214*   CALCIUM mg/dL 9.1 9.1 9.2   < > 9.3   BILIRUBIN mg/dL  --   --   --   --  0.2   ALK PHOS U/L  --   --   --   --  155*   ALT (SGPT) U/L  --   --   --   --  20   AST (SGOT) U/L  --   --   --   --  22   ANION GAP mmol/L 15.0 19.0* 18.0*   < > 27.0*    < > = values in this interval not displayed.     Estimated Creatinine Clearance: 252.8 mL/min (A) (by C-G formula based on SCr of 0.41 mg/dL (L)).  Results from last 7 days   Lab Units 10/08/21  1610 10/08/21  1150 10/08/21  0829   MAGNESIUM mg/dL 1.9 1.8 1.8   PHOSPHORUS mg/dL 3.1 2.7 2.8         Results from last 7 days   Lab Units 10/10/21  0659 10/09/21  0450 10/08/21  0414 10/07/21  0403 10/06/21  0420   WBC 10*3/mm3 10.61 10.23 10.61 12.69* 13.69*   HEMOGLOBIN g/dL 14.2 14.8 15.0 14.9 14.5   HEMATOCRIT % 42.0 43.6 42.8 42.0 41.1   PLATELETS 10*3/mm3 142 124* 148 180 194           Culture Data:   No results found for: BLOODCX  No results found for: URINECX  No results found for: RESPCX  No results found for: WOUNDCX  No results found for: STOOLCX  No components found for: BODYFLD    Radiology Data:   Imaging Results (Last 24 Hours)     Procedure Component Value Units Date/Time    CT Angiogram Chest [[id number]] Collected: 10/09/21 1759     Updated: 10/09/21 1953    Narrative:      EXAM:  CT CHEST  ANGIOGRAPHY WITH IV CONTRAST    ORDERING PROVIDER:  MARILY SERRATO    CLINICAL HISTORY:  Shortness of breath     COMPARISON:      TECHNIQUE:   Chest CT was performed using a high resolution pulmonary  angiogram protocol with 60ml of Isovue 370 as IV contrast and  reformatted in the sagittal and coronal planes.     3-dimensional images also acquired with special processing of the  CT scan data with a specialized workstation for evaluation.    This examination was performed according to our departmental dose  optimization program which includes automated exposure control,  adjustment of the MA and kV according to patient size, and/or use  of iterative reconstruction technique.     FINDINGS:     LUNGS AND PLEURA: No atelectasis, scar, consolidation or  masses.No pleural effusion or plaques.  Normal interstitium.    HEART: Normal size and configuration. No pericardial effusion.     MEDIASTINUM AND RANDA: No significant adenopathy. Left hilar lymph  node with short axis measuring less than 1 cm.    AORTA AND GREAT VESSELS: No aneurysm or dissection.     PULMONARY ARTERIES: No filling defects.     UPPER ABDOMEN: Diffuse fatty change of liver. Small gallstones in  the gallbladder..    MUSCULOSKELETAL:  Unremarkable. Normal vertebral body height and  alignment. No lytic or sclerotic lesion.     EXTRATHORACIC SOFT TISSUES: No axillary adenopathy. No mass.  Unremarkable supraclavicular soft tissues.       Impression:      1.  No evidence of pulmonary embolism.  2.  Diffuse fatty change of liver.   3.  Small gallstones in the gallbladder..  4.  Physiologic size lymph nodes in the left hilum.    Electronically signed by:  Man Taylor MD  10/9/2021 7:51 PM CDT  Workstation: [id number]          Assessment/Plan     Hospital Problem List:  Principal Problem:    DKA (diabetic ketoacidosis) (HCC)  Active Problems:    Acute UTI (urinary tract infection)    Sepsis, unspecified organism (HCC)  Metabolic  acidosis  Hypernatremia     Plan  -Patient's DKA has resolved.  This was likely secondary to SGLT 2 inhibitor. She is tolerating oral diet and is on a sliding scale insulin along with counting carbs  -Continue subcutaneous Levemir 20 units every 12 hours  -Endocrinology input appreciated.  If patient's blood glucose remains well controlled we will plan to discharge in the next 24 hours with close follow-up with Dr. Holliday in office  -Discontinue IV fluids.  Patient's tachycardia has resolved and CTA of the chest did not show any PE  -Monitor potassium and electrolytes and replete as indicated  -Complete 7 days of IV ceftriaxone for sepsis and acute cystitis  -CT of the abdomen and pelvis did not show any pyelonephritis or intra-abdominal infection  -Blood cultures negative  -Continue home antihypertensive medications  -DVT prophylaxis with subcu Lovenox  -CODE STATUS is full code     Discharge Planning: I expect patient to be discharged in next 24 hours    I confirmed that the patient's Advance Care Plan is present, code status is documented, or surrogate decision maker is listed in the patient's medical record.      I have utilized all available immediate resources to obtain, update, or review the patient's current medications.      Raj Quinones MD   10/10/21   14:56 CDT    Electronically signed by Raj Quinones MD at 10/10/21 [id number]

## 2021-10-11 NOTE — PAYOR COMM NOTE
"Continued Stay Review  Auth #   [id number]  Mary Whipple RN, [facility]  [phone] phone  [phone] fax          Dudley Bose (30 y.o. Female)             Date of Birth Social Security Number Address Home Phone MRN    [id number]  [address] [address] [phone] [id number]    Jewish Marital Status             [employer] Single       Admission Date Admission Type Admitting Provider Attending Provider Department, Room/Bed    10/4/21 Urgent Raj Quinones MD Ebenibo, Sotonte E, MD [address], 305/1    Discharge Date Discharge Disposition Discharge Destination                         Attending Provider: Raj Quinones MD    Allergies: Hydroxyquinolines    Isolation: None   Infection: None   Code Status: CPR   Advance Care Planning Activity    Ht: 167.6 cm (66\")   Wt: 107 kg (235 lb 12.8 oz)    Admission Cmt: None   Principal Problem: DKA (diabetic ketoacidosis) (HCC) [E11.10]                 Active Insurance as of 10/4/2021     Primary Coverage     Payor Plan Insurance Group Employer/Plan Group    [employer] [place] [facility]      Payor Plan Address Payor Plan Phone Number Payor Plan Fax Number Effective Dates    [address] [address]   11/1/2018 - None Entered    [facility] [place] [address]       Subscriber Name Subscriber Birth Date Member ID       DUDLEY BOSE 1990 [id number]                 Emergency Contacts      (Rel.) Home Phone Work Phone Mobile Phone    JAZMYN BOSE (Sister) [phone] -- [phone]              Vital Signs (last 2 days)     Date/Time Temp Temp src Pulse Resp BP Patient Position SpO2    10/11/21 0714 97.3 (36.3) Temporal 94  18 127/89 Sitting 94    10/11/21 0345 97.4 (36.3) Temporal 88 18 117/67 Lying 95    10/11/21 0033 -- -- 90 -- -- -- --    10/10/21 2351 97.7 (36.5) Temporal 104 18 127/66 Lying 95    10/10/21 1900 97.6 (36.4) Oral 95 18 143/74 Lying 96    10/10/21 1528 97.3 (36.3) " Temporal 101 18 138/81 Lying 96    10/10/21 1509 -- -- 105 -- -- -- --    10/10/21 1059 97 (36.1) Temporal 95 18 107/54 Lying 94    10/10/21 0712 -- -- 88 -- -- -- --    10/10/21 0710 97.5 (36.4) Temporal 87 18 132/85 Sitting 100    10/10/21 0642 -- -- 83 -- -- -- --    10/10/21 0300 97.6 (36.4) Oral 92 18 121/81 Lying 98    10/10/21 0058 -- -- 88 -- -- -- --    10/09/21 2310 97.4 (36.3) Oral 96 18 136/76 Lying 98    10/09/21 1927 97.1 (36.2) Oral 102 18 143/80 Lying 99    10/09/21 1833 -- -- 91 -- -- -- --    10/09/21 1808 97.3 (36.3) Temporal 87 18 118/66 Lying 99    10/09/21 1700 -- -- 109 -- 140/67 -- 98    10/09/21 1500 -- -- 98 -- 116/71 -- 95    10/09/21 1400 -- -- 104 -- 110/63 -- 95    10/09/21 1300 -- -- 102 -- 112/59 -- 95    10/09/21 1200 97.3 (36.3) Axillary -- 20 -- Lying --    10/09/21 1100 -- -- 100 -- 103/50 -- 92    10/09/21 1007 -- -- 111 -- 110/58 -- 95    10/09/21 1000 -- -- 125 -- -- -- 98    10/09/21 0900 -- -- 81 -- 123/77 -- 98    10/09/21 0800 96.5 (35.8) Axillary 80 20 129/72 Lying 97    10/09/21 0700 -- -- 85 -- 110/59 -- 94    10/09/21 0600 -- -- 80 -- 115/68 -- 94    10/09/21 0500 -- -- 76 -- 134/86 -- 98    10/09/21 0400 97.8 (36.6) Tympanic 76 20 131/81 Lying 98    10/09/21 0300 -- -- 91 -- 134/92 -- 98    10/09/21 0200 -- -- 76 -- 131/65 -- 98    10/09/21 0100 -- -- 88 26 133/74 -- --    10/09/21 0000 97.9 (36.6) Temporal 83 24 117/67 -- 98    Comments:   Pulse: Simultaneous filing. User may be unaware of other data. at 10/11/21 0714       Current Facility-Administered Medications   Medication Dose Route Frequency Provider Last Rate Last Admin   • acetaminophen (TYLENOL) tablet 650 mg  650 mg Oral Q4H PRN Raj Quinones MD   650 mg at 10/10/21 1718   • cloNIDine (CATAPRES) tablet 0.1 mg  0.1 mg Oral Daily Raj Quinones MD   0.1 mg at 10/11/21 0846   • cyclobenzaprine (FLEXERIL) tablet 5 mg  5 mg Oral TID PRN Raj Quinones MD   5 mg at 10/10/21 2059   • diazePAM  (VALIUM) tablet 4 mg  4 mg Oral Q8H PRN Raj Quinones MD   4 mg at 10/10/21 2059   • dicyclomine (BENTYL) capsule 20 mg  20 mg Oral 4x Daily PRN Raj Quinones MD       • enoxaparin (LOVENOX) syringe 40 mg  40 mg Subcutaneous Q24H Raj Quinones MD   40 mg at 10/08/21 1740   • escitalopram (LEXAPRO) tablet 10 mg  10 mg Oral Daily Raj Quinones MD   10 mg at 10/11/21 0846   • insulin aspart (novoLOG) injection 2-10 Units  2-10 Units Subcutaneous TID With Meals Raj Quinones MD   8 Units at 10/11/21 0845   • insulin aspart (novoLOG) injection 2-8 Units  2-8 Units Subcutaneous 4x Daily Raj Quinones MD   2 Units at 10/11/21 0841   • insulin aspart (novoLOG) injection 2-8 Units  2-8 Units Subcutaneous Q24H Raj Quinones MD   2 Units at 10/11/21 0226   • insulin detemir (LEVEMIR) injection 20 Units  20 Units Subcutaneous Q12H Raj Quinones MD   20 Units at 10/11/21 0851   • lisinopril (PRINIVIL,ZESTRIL) tablet 20 mg  20 mg Oral Q24H Raj Quinones MD   20 mg at 10/11/21 0846   • loperamide (IMODIUM) capsule 2 mg  2 mg Oral 4x Daily PRN Raj Quinones MD       • LORazepam (ATIVAN) injection 1 mg  1 mg Intravenous Q4H PRN Raj Quinones MD   1 mg at 10/07/21 2009   • Magnesium Sulfate 2 gram Bolus, followed by 8 gram infusion (total Mg dose 10 grams)- Mg less than or equal to 1mg/dL  2 g Intravenous PRN Raj Quinones MD        Or   • Magnesium Sulfate 2 gram / 50mL Infusion (GIVE X 3 BAGS TO EQUAL 6GM TOTAL DOSE) - Mg 1.1 - 1.5 mg/dl  2 g Intravenous PRN Raj Quinones MD        Or   • Magnesium Sulfate 4 gram infusion- Mg 1.6-1.9 mg/dL  4 g Intravenous PRN Raj Quinones MD       • morphine injection 2 mg  2 mg Intravenous Q2H PRN Raj Quinones MD   2 mg at 10/07/21 2212    And   • naloxone (NARCAN) injection 0.4 mg  0.4 mg Intravenous Q5 Min PRN Raj Quinones MD       • ondansetron (ZOFRAN) injection 4 mg  4 mg Intravenous Q6H PRN  Raj Quinones MD       • potassium chloride (MICRO-K) CR capsule 40 mEq  40 mEq Oral PRN Raj Quinones MD   40 mEq at 10/11/21 0919    Or   • potassium chloride (KLOR-CON) packet 40 mEq  40 mEq Oral PRN Raj Quinones MD        Or   • potassium chloride 10 mEq in 100 mL IVPB  10 mEq Intravenous Q1H PRN Raj Quinones  mL/hr at 10/08/21 1738 10 mEq at 10/08/21 1738   • potassium phosphate 45 mmol in sodium chloride 0.9 % 500 mL infusion  45 mmol Intravenous PRN Raj Quinones MD 62.5 mL/hr at 10/05/21 1345 45 mmol at 10/05/21 1345    Or   • potassium phosphate 30 mmol in sodium chloride 0.9 % 250 mL infusion  30 mmol Intravenous PRN Raj Quinones MD 31.3 mL/hr at 10/06/21 2154 30 mmol at 10/06/21 2154    Or   • Potassium Phosphates 15 mmol in sodium chloride 0.9 % 100 mL infusion  15 mmol Intravenous PRN Raj Quinones MD   15 mmol at 10/07/21 1706    Or   • sodium phosphates 45 mmol in sodium chloride 0.9 % 500 mL IVPB  45 mmol Intravenous PRN Raj Quinones MD        Or   • sodium phosphates 30 mmol in sodium chloride 0.9 % 250 mL IVPB  30 mmol Intravenous PRN Raj Quinones MD        Or   • sodium phosphates 15 mmol in sodium chloride 0.9 % 250 mL IVPB  15 mmol Intravenous PRN Raj Quinones MD       • sodium chloride 0.45 % with KCl 20 mEq/L infusion  250 mL/hr Intravenous Continuous PRRaj Carrera MD   Stopped at 10/04/21 7312   • sodium chloride 0.9 % flush 10 mL  10 mL Intravenous PRN Raj Quinones MD       • sodium chloride 0.9 % flush 10 mL  10 mL Intravenous Q12H Raj Quinones MD   10 mL at 10/11/21 0847   • sodium chloride 0.9 % flush 10 mL  10 mL Intravenous PRN Raj Quinones MD         Lab Results (most recent)     Procedure Component Value Units Date/Time    CBC & Differential [[id number]]  (Abnormal) Collected: 10/11/21 0449    Specimen: Blood Updated: 10/11/21 0704    Narrative:      The following orders were created  for panel order CBC & Differential.  Procedure                               Abnormality         Status                     ---------                               -----------         ------                     CBC Auto Differential[[id number]]        Abnormal            Final result               Scan Slide[[id number]]                                       Final result                 Please view results for these tests on the individual orders.    Scan Slide [[id number]] Collected: 10/11/21 0449    Specimen: Blood Updated: 10/11/21 0704     RBC Morphology Normal     WBC Morphology Normal     Platelet Estimate Decreased    POC Glucose Once [[id number]]  (Abnormal) Collected: 10/11/21 0622    Specimen: Blood Updated: 10/11/21 0651     Glucose 153 mg/dL      Comment: RN NotifiedOperator: [id number] GEETA ELRITAMeter ID: [id number]       Basic Metabolic Panel [[id number]]  (Abnormal) Collected: 10/11/21 0449    Specimen: Blood Updated: 10/11/21 0608     Glucose 172 mg/dL      BUN 7 mg/dL      Creatinine 0.45 mg/dL      Sodium 141 mmol/L      Potassium 3.5 mmol/L      Comment: Slight hemolysis detected by analyzer. Results may be affected.        Chloride 104 mmol/L      CO2 21.0 mmol/L      Calcium 9.2 mg/dL      eGFR Non African Amer >150 mL/min/1.73      BUN/Creatinine Ratio 15.6     Anion Gap 16.0 mmol/L     Narrative:      GFR Normal >60  Chronic Kidney Disease <60  Kidney Failure <15      CBC Auto Differential [[id number]]  (Abnormal) Collected: 10/11/21 0449    Specimen: Blood Updated: 10/11/21 0546     WBC 8.43 10*3/mm3      RBC 4.29 10*6/mm3      Hemoglobin 13.2 g/dL      Hematocrit 38.8 %      MCV 90.4 fL      MCH 30.8 pg      MCHC 34.0 g/dL      RDW 13.0 %      RDW-SD 41.9 fl      MPV 12.7 fL      Platelets 128 10*3/mm3      Neutrophil % 61.2 %      Lymphocyte % 27.6 %      Monocyte % 8.1 %      Eosinophil % 1.4 %      Basophil % 0.2 %      Immature Grans % 1.5 %      Neutrophils, Absolute 5.15 10*3/mm3       Lymphocytes, Absolute 2.33 10*3/mm3      Monocytes, Absolute 0.68 10*3/mm3      Eosinophils, Absolute 0.12 10*3/mm3      Basophils, Absolute 0.02 10*3/mm3      Immature Grans, Absolute 0.13 10*3/mm3      nRBC 0.0 /100 WBC     POC Glucose Once [[id number]]  (Abnormal) Collected: 10/11/21 0223    Specimen: Blood Updated: 10/11/21 0239     Glucose 152 mg/dL      Comment: RN NotifiedOperator: [id number] EVANS Allison ID: [id number]       Potassium [[id number]]  (Normal) Collected: 10/10/21 1631    Specimen: Blood Updated: 10/10/21 1709     Potassium 4.3 mmol/L      Comment: Slight hemolysis detected by analyzer. Results may be affected.       Basic Metabolic Panel [[id number]]  (Abnormal) Collected: 10/10/21 0659    Specimen: Blood Updated: 10/10/21 0743     Glucose 130 mg/dL      BUN 7 mg/dL      Creatinine 0.41 mg/dL      Sodium 144 mmol/L      Potassium 3.2 mmol/L      Chloride 107 mmol/L      CO2 22.0 mmol/L      Calcium 9.1 mg/dL      eGFR Non African Amer >150 mL/min/1.73      BUN/Creatinine Ratio 17.1     Anion Gap 15.0 mmol/L     Narrative:      GFR Normal >60  Chronic Kidney Disease <60  Kidney Failure <15      CBC & Differential [[id number]]  (Abnormal) Collected: 10/10/21 0659    Specimen: Blood Updated: 10/10/21 0725    Narrative:      The following orders were created for panel order CBC & Differential.  Procedure                               Abnormality         Status                     ---------                               -----------         ------                     CBC Auto Differential[[id number]]        Abnormal            Final result               Scan Slide[[id number]]                                                                    Please view results for these tests on the individual orders.    CBC Auto Differential [[id number]]  (Abnormal) Collected: 10/10/21 0659    Specimen: Blood Updated: 10/10/21 0725     WBC 10.61 10*3/mm3      RBC 4.69 10*6/mm3      Hemoglobin 14.2 g/dL       Hematocrit 42.0 %      MCV 89.6 fL      MCH 30.3 pg      MCHC 33.8 g/dL      RDW 13.3 %      RDW-SD 42.8 fl      MPV 11.7 fL      Platelets 142 10*3/mm3      Neutrophil % 68.6 %      Lymphocyte % 23.2 %      Monocyte % 6.4 %      Eosinophil % 0.8 %      Basophil % 0.2 %      Immature Grans % 0.8 %      Neutrophils, Absolute 7.28 10*3/mm3      Lymphocytes, Absolute 2.46 10*3/mm3      Monocytes, Absolute 0.68 10*3/mm3      Eosinophils, Absolute 0.09 10*3/mm3      Basophils, Absolute 0.02 10*3/mm3      Immature Grans, Absolute 0.08 10*3/mm3      nRBC 0.0 /100 WBC     Blood Culture - Blood, Arm, Right [[id number]]  (Normal) Collected: 10/04/21 1700    Specimen: Blood from Arm, Right Updated: 10/09/21 1815     Blood Culture No growth at 5 days    Narrative:      The previously reported component GRAM STAIN is no longer being reported. Previous result was <null> (Reference Range: [Reference Range]) on 10/8/2021 at 1815 CDT.    Blood Culture - Blood, Arm, Right [[id number]]  (Normal) Collected: 10/04/21 1759    Specimen: Blood from Arm, Right Updated: 10/09/21 1815     Blood Culture No growth at 5 days    Narrative:      The previously reported component GRAM STAIN is no longer being reported. Previous result was <null> (Reference Range: [Reference Range]) on 10/8/2021 at 1815 CDT.    Potassium [[id number]]  (Normal) Collected: 10/08/21 2249    Specimen: Blood Updated: 10/08/21 2335     Potassium 3.9 mmol/L      Comment: Slight hemolysis detected by analyzer. Results may be affected.       Beta-Hydroxybutyrate [[id number]]  (Abnormal) Collected: 10/05/21 1422    Specimen: Blood Updated: 10/08/21 1908     Beta-Hydroxybutyrate Acid 66 mg/dL      Comment: Reference Range:  All Ages (fasting): 0.2 - 2.8       Narrative:      Performed at:  [facility] - [facility]  [address] [place] [address]  [id number]  : Andrés Aragon MD, Phone:  [phone]    Phosphorus [[id number]]  (Normal) Collected: 10/08/21 1610     Specimen: Blood Updated: 10/08/21 1631     Phosphorus 3.1 mg/dL     Magnesium [[id number]]  (Normal) Collected: 10/08/21 1610    Specimen: Blood Updated: 10/08/21 1631     Magnesium 1.9 mg/dL     Phosphorus [[id number]]  (Normal) Collected: 10/08/21 1150    Specimen: Blood Updated: 10/08/21 1243     Phosphorus 2.7 mg/dL     Magnesium [[id number]]  (Normal) Collected: 10/08/21 1150    Specimen: Blood Updated: 10/08/21 1243     Magnesium 1.8 mg/dL     C-Peptide [[id number]] Collected: 10/06/21 2326    Specimen: Blood Updated: 10/08/21 1011     C-Peptide 1.3 ng/mL      Comment: C-Peptide reference interval is for fasting patients.       Narrative:      Performed at:  [address] [place] [address]  [id number]  : Wilfredo Jung PhD, Phone:  [phone]    Urea Nitrogen, Urine - Urine, Clean Catch [[id number]] Collected: 10/06/21 2033    Specimen: Urine, Clean Catch Updated: 10/07/21 1221     Urea Nitrogen, Urine 317 mg/dL     Narrative:      Reference intervals for random urine have not been established.  Clinical usage is dependent upon physician's interpretation in combination with other laboratory tests.       Creatinine, Urine, Random - Urine, Clean Catch [[id number]] Collected: 10/06/21 2033    Specimen: Urine, Clean Catch Updated: 10/07/21 1221     Creatinine, Urine 43.3 mg/dL     Narrative:      Reference intervals for random urine have not been established.  Clinical usage is dependent upon physician's interpretation in combination with other laboratory tests.       TSH+Free T4 [[id number]]  (Abnormal) Collected: 10/07/21 0403    Specimen: Blood Updated: 10/07/21 0602     TSH 0.135 uIU/mL      Free T4 0.95 ng/dL      Comment: T4 results may be falsely increased if patient taking Biotin.       Glutamic Acid Decarboxylase [[id number]] Collected: 10/06/21 2326    Specimen: Blood Updated: 10/06/21 2329    Osmolality, Urine - Urine, Clean Catch [[id number]]  (Normal) Collected: 10/06/21 2033     Specimen: Urine, Clean Catch Updated: 10/06/21 2140     Osmolality, Urine 735 mOsm/kg     Urinalysis, Microscopic Only - Urine, Clean Catch [[id number]]  (Abnormal) Collected: 10/06/21 2033    Specimen: Urine, Clean Catch Updated: 10/06/21 2106     RBC, UA Too Numerous to Count /HPF      WBC, UA 0-2 /HPF      Bacteria, UA None Seen /HPF      Squamous Epithelial Cells, UA None Seen /HPF      Hyaline Casts, UA None Seen /LPF      Methodology Automated Microscopy    Urinalysis With Microscopic If Indicated (No Culture) - Urine, Clean Catch [[id number]]  (Abnormal) Collected: 10/06/21 2033    Specimen: Urine, Clean Catch Updated: 10/06/21 2106     Color, UA Yellow     Appearance, UA Clear     pH, UA 5.5     Specific Gravity, UA 1.029     Comment: Result obtained by Refractometer        Glucose, UA >=1000 mg/dL (3+)     Ketones, UA 80 mg/dL (3+)     Bilirubin, UA Negative     Blood, UA Large (3+)     Protein, UA 30 mg/dL (1+)     Leuk Esterase, UA Negative     Nitrite, UA Negative     Urobilinogen, UA 0.2 E.U./dL    Sodium, Urine, Random - Urine, Clean Catch [[id number]] Collected: 10/06/21 2033    Specimen: Urine, Clean Catch Updated: 10/06/21 2050     Sodium, Urine 53 mmol/L     Narrative:      Reference intervals for random urine have not been established.  Clinical usage is dependent upon physician's interpretation in combination with other laboratory tests.       Potassium, Urine, Random - Urine, Clean Catch [[id number]] Collected: 10/06/21 2033    Specimen: Urine, Clean Catch Updated: 10/06/21 2049     Potassium, Urine 36.5 mmol/L     Narrative:      Reference intervals for random urine have not been established.  Clinical usage is dependent upon physician's interpretation in combination with other laboratory tests.       Chloride, Urine, Random - Urine, Clean Catch [[id number]] Collected: 10/06/21 2033    Specimen: Urine, Clean Catch Updated: 10/06/21 2049     Chloride, Urine 98 mmol/L     Narrative:      Reference  intervals for random urine have not been established.  Clinical usage is dependent upon physician's interpretation in combination with other laboratory tests.       D- LACTATE ( NOT CONVENTIONAL LACTIC ACID THAT MEASURE L- LACTATE) W NEXT SET OF LABS - Miscellaneous Test [[id number]] Collected: 10/06/21 1957    Specimen: Blood Updated: 10/06/21 1959    Lactic Acid, Plasma [[id number]]  (Normal) Collected: 10/06/21 1506    Specimen: Blood Updated: 10/06/21 1540     Lactate 0.9 mmol/L     Acetone [[id number]]  (Abnormal) Collected: 10/06/21 1506    Specimen: Blood Updated: 10/06/21 1531     Acetone Large    Urine Drug Screen - Urine, Clean Catch [[id number]]  (Abnormal) Collected: 10/05/21 0956    Specimen: Urine, Clean Catch Updated: 10/05/21 1017     THC, Screen, Urine Positive     Phencyclidine (PCP), Urine Negative     Cocaine Screen, Urine Negative     Methamphetamine, Ur Negative     Opiate Screen Negative     Amphetamine Screen, Urine Negative     Benzodiazepine Screen, Urine Positive     Tricyclic Antidepressants Screen Negative     Methadone Screen, Urine Negative     Barbiturates Screen, Urine Negative     Oxycodone Screen, Urine Negative     Propoxyphene Screen Negative     Buprenorphine, Screen, Urine Negative    Narrative:      Cutoff For Drugs Screened:    Amphetamines               500 ng/ml  Barbiturates               200 ng/ml  Benzodiazepines            150 ng/ml  Cocaine                    150 ng/ml  Methadone                  200 ng/ml  Opiates                    100 ng/ml  Phencyclidine               25 ng/ml  THC                            50 ng/ml  Methamphetamine            500 ng/ml  Tricyclic Antidepressants  300 ng/ml  Oxycodone                  100 ng/ml  Propoxyphene               300 ng/ml  Buprenorphine               10 ng/ml    The normal value for all drugs tested is negative. This report includes unconfirmed screening results, with the cutoff values listed, to be used for medical  treatment purposes only.  Unconfirmed results must not be used for non-medical purposes such as employment or legal testing.  Clinical consideration should be applied to any drug of abuse test, particularly when unconfirmed results are used.      COVID PRE-OP / PRE-PROCEDURE SCREENING ORDER (NO ISOLATION) - Swab, Nasopharynx [[id number]]  (Normal) Collected: 10/04/21 2252    Specimen: Swab from Nasopharynx Updated: 10/05/21 0008    Narrative:      The following orders were created for panel order COVID PRE-OP / PRE-PROCEDURE SCREENING ORDER (NO ISOLATION) - Swab, Nasopharynx.  Procedure                               Abnormality         Status                     ---------                               -----------         ------                     Respiratory Panel PCR w/...[[id number]]  Normal              Final result                 Please view results for these tests on the individual orders.    Respiratory Panel PCR w/COVID-19(SARS-CoV-2) TOBY/MATTY/AHSAN/PAD/COR/MAD/BLANE In-House, NP Swab in UTM/VTM, 3-4 HR TAT - Swab, Nasopharynx [[id number]]  (Normal) Collected: 10/04/21 2252    Specimen: Swab from Nasopharynx Updated: 10/05/21 0008     ADENOVIRUS, PCR Not Detected     Coronavirus 229E Not Detected     Coronavirus HKU1 Not Detected     Coronavirus NL63 Not Detected     Coronavirus OC43 Not Detected     COVID19 Not Detected     Human Metapneumovirus Not Detected     Human Rhinovirus/Enterovirus Not Detected     Influenza A PCR Not Detected     Influenza B PCR Not Detected     Parainfluenza Virus 1 Not Detected     Parainfluenza Virus 2 Not Detected     Parainfluenza Virus 3 Not Detected     Parainfluenza Virus 4 Not Detected     RSV, PCR Not Detected     Bordetella pertussis pcr Not Detected     Bordetella parapertussis PCR Not Detected     Chlamydophila pneumoniae PCR Not Detected     Mycoplasma pneumo by PCR Not Detected    Narrative:      In the setting of a positive respiratory panel with a viral infection PLUS a  negative procalcitonin without other underlying concern for bacterial infection, consider observing off antibiotics or discontinuation of antibiotics and continue supportive care. If the respiratory panel is positive for atypical bacterial infection (Bordetella pertussis, Chlamydophila pneumoniae, or Mycoplasma pneumoniae), consider antibiotic de-escalation to target atypical bacterial infection.    Comprehensive Metabolic Panel [[id number]]  (Abnormal) Collected: 10/04/21 2007    Specimen: Blood Updated: 10/04/21 2041     Glucose 214 mg/dL      BUN 17 mg/dL      Creatinine 1.02 mg/dL      Sodium 142 mmol/L      Potassium 4.8 mmol/L      Comment: Slight hemolysis detected by analyzer. Results may be affected.        Chloride 113 mmol/L      CO2 2.0 mmol/L      Calcium 9.3 mg/dL      Total Protein 7.8 g/dL      Albumin 4.80 g/dL      ALT (SGPT) 20 U/L      AST (SGOT) 22 U/L      Comment: Slight hemolysis detected by analyzer. Results may be affected.        Alkaline Phosphatase 155 U/L      Total Bilirubin 0.2 mg/dL      eGFR Non African Amer 65 mL/min/1.73      Globulin 3.0 gm/dL      A/G Ratio 1.6 g/dL      BUN/Creatinine Ratio 16.7     Anion Gap 27.0 mmol/L     Narrative:      GFR Normal >60  Chronic Kidney Disease <60  Kidney Failure <15      Blood Gas, Venous - [[id number]]  (Abnormal) Collected: 10/04/21 2010    Specimen: Venous Blood Updated: 10/04/21 2017     Site OTHER     pH, Venous 6.943 pH Units      Comment: 85 Value below critical limit        pCO2, Venous 9.8 mm Hg      Comment: 84 Value below reference range        pO2, Venous 143.0 mm Hg      Comment: 83 Value above reference range        HCO3, Venous 2.1 mmol/L      Comment: 84 Value below reference range        Base Excess, Venous -28.5 mmol/L      Comment: 84 Value below reference range        O2 Saturation, Venous 99.1 %      Comment: 83 Value above reference range        Barometric Pressure for Blood Gas 747 mmHg      Comment: PATM not performed  at this facility.        Modality Room Air     Ventilator Mode NA     Collected by DE     Comment: Meter: [id number]     :  [id number]       Manual Differential [[id number]]  (Abnormal) Collected: 10/04/21 1800    Specimen: Blood Updated: 10/04/21 1928     Neutrophil % 71.0 %      Lymphocyte % 10.0 %      Monocyte % 10.0 %      Eosinophil % 1.0 %      Bands %  7.0 %      Metamyelocyte % 1.0 %      Neutrophils Absolute 23.41 10*3/mm3      Lymphocytes Absolute 3.00 10*3/mm3      Monocytes Absolute 3.00 10*3/mm3      Eosinophils Absolute 0.30 10*3/mm3      RBC Morphology Normal     WBC Morphology Normal     Platelet Morphology Normal    Urinalysis, Microscopic Only - Urine, Clean Catch [[id number]]  (Abnormal) Collected: 10/04/21 1750    Specimen: Urine, Clean Catch Updated: 10/04/21 1854     RBC, UA 21-30 /HPF      WBC, UA 3-5 /HPF      Bacteria, UA 2+ /HPF      Squamous Epithelial Cells, UA 3-5 /HPF      Hyaline Casts, UA 21-30 /LPF      Methodology Manual Light Microscopy    Osmolality, Serum [[id number]]  (Abnormal) Collected: 10/04/21 1803    Specimen: Blood Updated: 10/04/21 1847     Osmolality 333 mOsm/kg     D-dimer, Quantitative [[id number]]  (Abnormal) Collected: 10/04/21 1800    Specimen: Blood Updated: 10/04/21 1837     D-Dimer, Quantitative 572 ng/mL (FEU)     Narrative:      Dimer values <500 ng/ml FEU are FDA approved as aid in diagnosis of deep venous thrombosis and pulmonary embolism.  This test should not be used in an exclusion strategy with pretest probability alone.    A recent guideline regarding diagnosis for pulmonary thromboembolism recommends an adjusted exclusion criterion of age x 10 ng/ml FEU for patients >50 years of age ([employer] Med 2015; 163: 701-711).      Lactic Acid, Plasma [[id number]]  (Normal) Collected: 10/04/21 1800    Specimen: Blood Updated: 10/04/21 1836     Lactate 1.3 mmol/L     Hemoglobin A1c [[id number]]  (Abnormal) Collected: 10/04/21 1800    Specimen: Blood  Updated: 10/04/21 1827     Hemoglobin A1C 13.10 %     Narrative:      Hemoglobin A1C Ranges:    Increased Risk for Diabetes  5.7% to 6.4%  Diabetes                     >= 6.5%  Diabetic Goal                < 7.0%    Pregnancy, Urine - Urine, Clean Catch [[id number]]  (Normal) Collected: 10/04/21 1749    Specimen: Urine, Clean Catch Updated: 10/04/21 1810     HCG, Urine QL Negative    Urinalysis With Culture If Indicated - Urine, Clean Catch [[id number]]  (Abnormal) Collected: 10/04/21 1750    Specimen: Urine, Clean Catch Updated: 10/04/21 1808     Color, UA Yellow     Appearance, UA Cloudy     pH, UA <=5.0     Specific Gravity, UA 1.023     Glucose, UA >=1000 mg/dL (3+)     Ketones, UA 80 mg/dL (3+)     Bilirubin, UA Negative     Blood, UA Large (3+)     Protein,  mg/dL (2+)     Leuk Esterase, UA Negative     Nitrite, UA Negative     Urobilinogen, UA 0.2 E.U./dL          Imaging Results (Most Recent)     Procedure Component Value Units Date/Time    CT Angiogram Chest [[id number]] Collected: 10/09/21 1759     Updated: 10/09/21 1953    Narrative:      EXAM:  CT CHEST ANGIOGRAPHY WITH IV CONTRAST    ORDERING PROVIDER:  MARILY SERRATO    CLINICAL HISTORY:  Shortness of breath     COMPARISON:      TECHNIQUE:   Chest CT was performed using a high resolution pulmonary  angiogram protocol with 60ml of Isovue 370 as IV contrast and  reformatted in the sagittal and coronal planes.     3-dimensional images also acquired with special processing of the  CT scan data with a specialized workstation for evaluation.    This examination was performed according to our departmental dose  optimization program which includes automated exposure control,  adjustment of the MA and kV according to patient size, and/or use  of iterative reconstruction technique.     FINDINGS:     LUNGS AND PLEURA: No atelectasis, scar, consolidation or  masses.No pleural effusion or plaques.  Normal interstitium.    HEART: Normal size and configuration.  No pericardial effusion.     MEDIASTINUM AND RANDA: No significant adenopathy. Left hilar lymph  node with short axis measuring less than 1 cm.    AORTA AND GREAT VESSELS: No aneurysm or dissection.     PULMONARY ARTERIES: No filling defects.     UPPER ABDOMEN: Diffuse fatty change of liver. Small gallstones in  the gallbladder..    MUSCULOSKELETAL:  Unremarkable. Normal vertebral body height and  alignment. No lytic or sclerotic lesion.     EXTRATHORACIC SOFT TISSUES: No axillary adenopathy. No mass.  Unremarkable supraclavicular soft tissues.       Impression:      1.  No evidence of pulmonary embolism.  2.  Diffuse fatty change of liver.   3.  Small gallstones in the gallbladder..  4.  Physiologic size lymph nodes in the left hilum.    Electronically signed by:  Man Taylor MD  10/9/2021 7:51 PM CDT  Workstation: [id number]    US Guided Vascular Access [[id number]] Collected: 10/05/21 1422     Updated: 10/05/21 1853    Narrative:      EXAM: US GUIDANCE FOR VASCULAR ACCESS    INDICATION: Intravenous access    FINDINGS:  Real-time ultrasound imaging was utilized to visualize needle  entry into the patient right basilic vein during midline catheter  placement. 2 images were stored for recording and reporting.      Impression:      Imaging obtained during vascular access device placement under  ultrasound guidance.    Electronically signed by:  Chanel Grajeda MD  10/5/2021  2:42 PM CDT Workstation: [id number]    CT Abdomen Pelvis Stone Protocol [[id number]] Collected: 10/05/21 1445     Updated: 10/05/21 1520    Narrative:        PROCEDURE: Ct abdomen and pelvis without contrast    REASON FOR EXAM: Abdominal pain, acute, nonlocalized  DKA. Abdominal pain. Evaluate for pyelonephritis or renal stone    FINDINGS: Axial computer tomography sequential imaging was  performed from the diaphragms through the symphysis pubis without  IV contrast administration. Sagittal and coronal reformates was  performed. This exam was  performed according to our departmental  dose optimization program, which includes automated exposure  control, adjustment of the mA and/or KV according to patient size  and/or use of iterative reconstruction technique.     Imaging through lung bases reveals no abnormality.    Diffuse low liver attenuation consistent with hepatic steatosis.  The liver is otherwise unremarkable. Hyperdense material is seen  within the gallbladder lumen. The gallbladder is otherwise  unremarkable. The biliary system appears within normal limits.  The pancreas is normal. The spleen is normal. Bilateral adrenal  glands are normal. The right kidney and ureter are normal. The  left kidney and ureter are normal. Agarwal catheter within the  bladder. The bladder is almost completely decompressed which  limits evaluation. IUD within the uterus endometrial canal. Right  ovarian 1.9 cm simple physiologic cyst. The ovaries are otherwise  unremarkable. Small punctate hyperdense focus within the lumen of  the appendix most likely representing hyperdense ingested food  materials versus small appendicolith. The appendix is otherwise  unremarkable. The hollow viscera is normal. No lymphadenopathy in  the abdomen or the pelvis. No acute osseous abnormality.      Impression:      1. Hepatic steatosis.  2.Hyperdense material is seen within the gallbladder lumen. The  differential for this finding would include vicarious excretion  of IV contrast versus sludge versus sand-like gallstones versus  hemorrhage. Recommend clinical correlation..  3.IUD within the uterus endometrial canal.   4.Right ovarian 1.9 cm simple physiologic cyst.   5.Small punctate hyperdense focus within the lumen of the  appendix most likely representing hyperdense ingested food  materials versus small appendicolith. The appendix is otherwise  unremarkable.   6. Remainder CT abdomen and pelvis exam unremarkable.    Electronically signed by:  Andrés Cochran MD  10/5/2021 3:18 PM  CDT  Workstation: [id number]    IR Insert Midline Without Port Pump 5 Plus [[id number]] Resulted: 10/05/21 1427     Updated: 10/05/21 1427    Narrative:      This procedure was auto-finalized with no dictation required.           Physician Progress Notes (most recent note)      Raj Quinones MD at 10/10/21 1456              [facility]  INPATIENT PROGRESS NOTE    Length of Stay: 6  Date of Admission: 10/4/2021  Primary Care Physician: Bj Duckworth MD    Subjective     Chief Complaint: Dysuria, abdominal pain, confusion    HPI: Patient feels much improved today.  She is oriented x3.  She denies abdominal pain.    Review of Systems   Constitutional: Negative for activity change, appetite change, chills, fatigue and fever.   HENT: Negative for congestion, ear pain, rhinorrhea, sore throat and trouble swallowing.    Respiratory: Negative for cough, chest tightness, shortness of breath and wheezing.    Cardiovascular: Negative for chest pain, palpitations and leg swelling.   Gastrointestinal: Positive for diarrhea. Negative for abdominal distention, abdominal pain, nausea and vomiting.   Genitourinary: Negative for difficulty urinating, dysuria and hematuria.   Musculoskeletal: Negative for arthralgias, back pain and myalgias.   Skin: Negative for pallor and rash.   Neurological: Negative for dizziness, syncope, weakness, light-headedness and headaches.   Hematological: Negative for adenopathy. Does not bruise/bleed easily.   Psychiatric/Behavioral: Negative for agitation and confusion. The patient is not nervous/anxious.      Objective    Temp:  [97 °F (36.1 °C)-97.6 °F (36.4 °C)] 97 °F (36.1 °C)  Heart Rate:  [] 95  Resp:  [18] 18  BP: (107-143)/(54-85) 107/54    Physical Exam  Constitutional:       General: She is not in acute distress.     Appearance: Normal appearance. She is obese. She is not ill-appearing or diaphoretic.   HENT:      Head:  Normocephalic and atraumatic.      Right Ear: External ear normal.      Left Ear: External ear normal.      Nose: No congestion or rhinorrhea.      Mouth/Throat:      Mouth: Mucous membranes are moist.      Pharynx: No oropharyngeal exudate or posterior oropharyngeal erythema.   Eyes:      General: No scleral icterus.     Extraocular Movements: Extraocular movements intact.      Conjunctiva/sclera: Conjunctivae normal.   Cardiovascular:      Rate and Rhythm: Normal rate and regular rhythm.      Heart sounds: Normal heart sounds. No murmur heard.      Pulmonary:      Effort: Pulmonary effort is normal. No respiratory distress.      Breath sounds: Normal breath sounds. No wheezing, rhonchi or rales.   Abdominal:      General: Abdomen is flat. There is no distension.      Palpations: Abdomen is soft.      Tenderness: There is no abdominal tenderness. There is no guarding.   Musculoskeletal:         General: No swelling, tenderness or deformity.      Cervical back: Neck supple. No rigidity. No muscular tenderness.      Right lower leg: No edema.      Left lower leg: No edema.   Lymphadenopathy:      Cervical: No cervical adenopathy.   Skin:     General: Skin is warm and dry.   Neurological:      General: No focal deficit present.      Mental Status: She is alert and oriented to person, place, and time.      Cranial Nerves: No cranial nerve deficit.      Motor: No weakness.   Psychiatric:         Mood and Affect: Mood normal.         Behavior: Behavior normal.         Thought Content: Thought content normal.       Medication Review:    Current Facility-Administered Medications:   •  acetaminophen (TYLENOL) tablet 650 mg, 650 mg, Oral, Q4H PRN, Raj Quinones MD  •  cefTRIAXone (ROCEPHIN) 2 g/100 mL 0.9% NS IVPB (MBP), 2 g, Intravenous, Q24H, Raj Quinones MD, 2 g at 10/09/21 1823  •  cloNIDine (CATAPRES) tablet 0.1 mg, 0.1 mg, Oral, Daily, Raj Quinones MD, 0.1 mg at 10/10/21 0936  •  dicyclomine  (BENTYL) capsule 20 mg, 20 mg, Oral, 4x Daily PRN, Raj Quinones MD  •  enoxaparin (LOVENOX) syringe 40 mg, 40 mg, Subcutaneous, Q24H, Raj Quinones MD, 40 mg at 10/08/21 1740  •  escitalopram (LEXAPRO) tablet 10 mg, 10 mg, Oral, Daily, Raj Quinones MD, 10 mg at 10/10/21 0849  •  insulin aspart (novoLOG) injection 2-10 Units, 2-10 Units, Subcutaneous, TID With Meals, Raj Quinones MD, 2 Units at 10/10/21 1206  •  insulin aspart (novoLOG) injection 2-8 Units, 2-8 Units, Subcutaneous, 4x Daily, Raj Quinones MD, 4 Units at 10/10/21 1207  •  insulin aspart (novoLOG) injection 2-8 Units, 2-8 Units, Subcutaneous, Q24H, Raj Quinones MD, 2 Units at 10/09/21 0347  •  insulin detemir (LEVEMIR) injection 20 Units, 20 Units, Subcutaneous, Q12H, Raj Quinones MD, 20 Units at 10/10/21 0849  •  lisinopril (PRINIVIL,ZESTRIL) tablet 20 mg, 20 mg, Oral, Q24H, Raj Quinones MD, 20 mg at 10/10/21 0849  •  loperamide (IMODIUM) capsule 2 mg, 2 mg, Oral, 4x Daily PRN, Raj Quinones MD  •  LORazepam (ATIVAN) injection 1 mg, 1 mg, Intravenous, Q4H PRN, Raj Quinones MD, 1 mg at 10/07/21 2009  •  Magnesium Sulfate 2 gram Bolus, followed by 8 gram infusion (total Mg dose 10 grams)- Mg less than or equal to 1mg/dL, 2 g, Intravenous, PRN **OR** Magnesium Sulfate 2 gram / 50mL Infusion (GIVE X 3 BAGS TO EQUAL 6GM TOTAL DOSE) - Mg 1.1 - 1.5 mg/dl, 2 g, Intravenous, PRN **OR** Magnesium Sulfate 4 gram infusion- Mg 1.6-1.9 mg/dL, 4 g, Intravenous, PRN, Raj Quinones MD  •  morphine injection 2 mg, 2 mg, Intravenous, Q2H PRN, 2 mg at 10/07/21 2212 **AND** naloxone (NARCAN) injection 0.4 mg, 0.4 mg, Intravenous, Q5 Min PRN, Raj Quinones MD  •  ondansetron (ZOFRAN) injection 4 mg, 4 mg, Intravenous, Q6H PRN, Raj Quinones MD  •  potassium chloride (MICRO-K) CR capsule 40 mEq, 40 mEq, Oral, PRN, 40 mEq at 10/10/21 1210 **OR** potassium chloride (KLOR-CON) packet 40 mEq,  40 mEq, Oral, PRN **OR** potassium chloride 10 mEq in 100 mL IVPB, 10 mEq, Intravenous, Q1H PRN, Raj Quinones MD, Last Rate: 100 mL/hr at 10/08/21 1738, 10 mEq at 10/08/21 1738  •  potassium phosphate 45 mmol in sodium chloride 0.9 % 500 mL infusion, 45 mmol, Intravenous, PRN, Last Rate: 62.5 mL/hr at 10/05/21 1345, 45 mmol at 10/05/21 1345 **OR** potassium phosphate 30 mmol in sodium chloride 0.9 % 250 mL infusion, 30 mmol, Intravenous, PRN, Last Rate: 31.3 mL/hr at 10/06/21 2154, 30 mmol at 10/06/21 2154 **OR** Potassium Phosphates 15 mmol in sodium chloride 0.9 % 100 mL infusion, 15 mmol, Intravenous, PRN, 15 mmol at 10/07/21 1706 **OR** sodium phosphates 45 mmol in sodium chloride 0.9 % 500 mL IVPB, 45 mmol, Intravenous, PRN **OR** sodium phosphates 30 mmol in sodium chloride 0.9 % 250 mL IVPB, 30 mmol, Intravenous, PRN **OR** sodium phosphates 15 mmol in sodium chloride 0.9 % 250 mL IVPB, 15 mmol, Intravenous, PRN, Raj Quinones MD  •  sodium chloride 0.45 % with KCl 20 mEq/L infusion, 250 mL/hr, Intravenous, Continuous PRN, Raj Quinones MD, Stopped at 10/04/21 2352  •  sodium chloride 0.9 % flush 10 mL, 10 mL, Intravenous, PRN, Raj Quinones MD  •  sodium chloride 0.9 % flush 10 mL, 10 mL, Intravenous, Q12H, Raj Quinones MD, 10 mL at 10/09/21 2050  •  sodium chloride 0.9 % flush 10 mL, 10 mL, Intravenous, PRN, Raj Quinones MD    I have reviewed the patient's current medications.     Results Review:  I have reviewed the labs, radiology results, and diagnostic studies.    Laboratory Data:   Results from last 7 days   Lab Units 10/10/21  0659 10/09/21  1126 10/09/21  0450 10/04/21  2359 10/04/21  2007   SODIUM mmol/L 144 142 145   < > 142   POTASSIUM mmol/L 3.2* 3.6 3.8   < > 4.8   CHLORIDE mmol/L 107 105 107   < > 113*   CO2 mmol/L 22.0 18.0* 20.0*   < > 2.0*   BUN mg/dL 7 6 5*   < > 17   CREATININE mg/dL 0.41* 0.50* 0.44*   < > 1.02*   GLUCOSE mg/dL 130* 241* 142*   < >  214*   CALCIUM mg/dL 9.1 9.1 9.2   < > 9.3   BILIRUBIN mg/dL  --   --   --   --  0.2   ALK PHOS U/L  --   --   --   --  155*   ALT (SGPT) U/L  --   --   --   --  20   AST (SGOT) U/L  --   --   --   --  22   ANION GAP mmol/L 15.0 19.0* 18.0*   < > 27.0*    < > = values in this interval not displayed.     Estimated Creatinine Clearance: 252.8 mL/min (A) (by C-G formula based on SCr of 0.41 mg/dL (L)).  Results from last 7 days   Lab Units 10/08/21  1610 10/08/21  1150 10/08/21  0829   MAGNESIUM mg/dL 1.9 1.8 1.8   PHOSPHORUS mg/dL 3.1 2.7 2.8         Results from last 7 days   Lab Units 10/10/21  0659 10/09/21  0450 10/08/21  0414 10/07/21  0403 10/06/21  0420   WBC 10*3/mm3 10.61 10.23 10.61 12.69* 13.69*   HEMOGLOBIN g/dL 14.2 14.8 15.0 14.9 14.5   HEMATOCRIT % 42.0 43.6 42.8 42.0 41.1   PLATELETS 10*3/mm3 142 124* 148 180 194           Culture Data:   No results found for: BLOODCX  No results found for: URINECX  No results found for: RESPCX  No results found for: WOUNDCX  No results found for: STOOLCX  No components found for: BODYFLD    Radiology Data:   Imaging Results (Last 24 Hours)     Procedure Component Value Units Date/Time    CT Angiogram Chest [[id number]] Collected: 10/09/21 1759     Updated: 10/09/21 1953    Narrative:      EXAM:  CT CHEST ANGIOGRAPHY WITH IV CONTRAST    ORDERING PROVIDER:  MARILY SERRATO    CLINICAL HISTORY:  Shortness of breath     COMPARISON:      TECHNIQUE:   Chest CT was performed using a high resolution pulmonary  angiogram protocol with 60ml of Isovue 370 as IV contrast and  reformatted in the sagittal and coronal planes.     3-dimensional images also acquired with special processing of the  CT scan data with a specialized workstation for evaluation.    This examination was performed according to our departmental dose  optimization program which includes automated exposure control,  adjustment of the MA and kV according to patient size, and/or use  of iterative reconstruction  technique.     FINDINGS:     LUNGS AND PLEURA: No atelectasis, scar, consolidation or  masses.No pleural effusion or plaques.  Normal interstitium.    HEART: Normal size and configuration. No pericardial effusion.     MEDIASTINUM AND RANDA: No significant adenopathy. Left hilar lymph  node with short axis measuring less than 1 cm.    AORTA AND GREAT VESSELS: No aneurysm or dissection.     PULMONARY ARTERIES: No filling defects.     UPPER ABDOMEN: Diffuse fatty change of liver. Small gallstones in  the gallbladder..    MUSCULOSKELETAL:  Unremarkable. Normal vertebral body height and  alignment. No lytic or sclerotic lesion.     EXTRATHORACIC SOFT TISSUES: No axillary adenopathy. No mass.  Unremarkable supraclavicular soft tissues.       Impression:      1.  No evidence of pulmonary embolism.  2.  Diffuse fatty change of liver.   3.  Small gallstones in the gallbladder..  4.  Physiologic size lymph nodes in the left hilum.    Electronically signed by:  Man Taylor MD  10/9/2021 7:51 PM CDT  Workstation: [id number]          Assessment/Plan     Hospital Problem List:  Principal Problem:    DKA (diabetic ketoacidosis) ([facility])  Active Problems:    Acute UTI (urinary tract infection)    Sepsis, unspecified organism (HCC)  Metabolic acidosis  Hypernatremia     Plan  -Patient's DKA has resolved.  This was likely secondary to SGLT 2 inhibitor. She is tolerating oral diet and is on a sliding scale insulin along with counting carbs  -Continue subcutaneous Levemir 20 units every 12 hours  -Endocrinology input appreciated.  If patient's blood glucose remains well controlled we will plan to discharge in the next 24 hours with close follow-up with Dr. Holliday in office  -Discontinue IV fluids.  Patient's tachycardia has resolved and CTA of the chest did not show any PE  -Monitor potassium and electrolytes and replete as indicated  -Complete 7 days of IV ceftriaxone for sepsis and acute cystitis  -CT of the abdomen and pelvis did not  show any pyelonephritis or intra-abdominal infection  -Blood cultures negative  -Continue home antihypertensive medications  -DVT prophylaxis with subcu Lovenox  -CODE STATUS is full code     Discharge Planning: I expect patient to be discharged in next 24 hours    I confirmed that the patient's Advance Care Plan is present, code status is documented, or surrogate decision maker is listed in the patient's medical record.      I have utilized all available immediate resources to obtain, update, or review the patient's current medications.      Raj Quinones MD   10/10/21   14:56 CDT    Electronically signed by Raj Quinones MD at 10/10/21 1503          Consult Notes (most recent note)      Christopher Cam MD at 10/07/21 0748      Consult Orders    1. Inpatient Endocrinology Consult [[id number]] ordered by Raj Quinones MD at 10/06/21 1205               CONSULT NOTE    Subjective Emili Caal is a 30 y.o. female who I am being consulted for  evaluation of DKA    Referring Provider  Raj Quinones MD    29 yo female w T2DM , poorly controlled, Aic of 13% treated with SGLT2 inhibitor steglatro [facility] with confusion and [facility]   Admitting pH was less than 6.9 w evidence of AG acidosis and hyperglcyemia. She received hydration, bicarb, electrolyte replacement , insulin and transferred to [facility].     Patient was admitted on Oct 4, 2021. From admitting labs there was evidence of Gap and Non Gap Metabolic Acidosis highlighted by a Delta Gap/Delta Bicarb more than 1 . Through course of treatment hypernatremia developed despite adequate fluid replacement w hypotonic fluids / K.   Concern was raised given persistent High Gap Acidosis after 48 hours of insulin drip treatment w documented normal lactic acid and adequate renal function.  Patient is lethargic.      Allergies   Allergen Reactions   • Hydroxyquinolines Other (See Comments)     Passed out and was dizzy        Past Medical History:   Diagnosis Date   • Allergic    • Anxiety    • Asthma    • Chronic diarrhea    • Depression    • Diabetes mellitus (HCC)    • High blood pressure    • Hyperlipidemia    • Liver disease    • PTSD (post-traumatic stress disorder)      Family History   Problem Relation Age of Onset   • Liver disease Mother    • Liver disease Father      Social History     Tobacco Use   • Smoking status: Current Every Day Smoker     Packs/day: 0.50     Types: Cigarettes   • Smokeless tobacco: Never Used   Vaping Use   • Vaping Use: Former   Substance Use Topics   • Alcohol use: Never   • Drug use: Not Currently         Current Facility-Administered Medications:   •  acetaminophen (TYLENOL) tablet 650 mg, 650 mg, Oral, Q4H PRN, Raj Quinones MD  •  cefTRIAXone (ROCEPHIN) 2 g/100 mL 0.9% NS IVPB (MBP), 2 g, Intravenous, Q24H, Raj Quinones MD, 2 g at 10/06/21 1831  •  cloNIDine (CATAPRES) tablet 0.1 mg, 0.1 mg, Oral, Daily, Michael Bell Jr., MD, 0.1 mg at 10/05/21 2157  •  dextrose (D50W) 25 g/ 50mL Intravenous Solution 12.5 g, 12.5 g, Intravenous, PRN, Raj Quinones MD  •  dextrose 5 % 1,000 mL with potassium chloride 40 mEq infusion, , Intravenous, Continuous, Raj Quinones MD, Last Rate: 250 mL/hr at 10/07/21 0458, New Bag at 10/07/21 0458  •  dextrose 5 % and sodium chloride 0.45 % infusion, 150 mL/hr, Intravenous, Continuous PRN, Raj Quinones MD  •  dextrose 5 % and sodium chloride 0.45 % with KCl 20 mEq/L infusion, 150 mL/hr, Intravenous, Continuous PRN, Raj Quinones MD, Stopped at 10/05/21 0515  •  dextrose 5 % and sodium chloride 0.45 % with KCl 20 mEq/L infusion, 200 mL/hr, Intravenous, Continuous PRN, Michael Bell Jr., MD, Last Rate: 200 mL/hr at 10/05/21 2253, 200 mL/hr at 10/05/21 2253  •  dextrose 5 % and sodium chloride 0.45 % with KCl 40 mEq/L infusion, 150 mL/hr, Intravenous, Continuous PRN, Raj Quinones MD, Stopped at 10/06/21 1100  •   dextrose 5 % and sodium chloride 0.9 % infusion, 150 mL/hr, Intravenous, Continuous PRN, Raj Quinones MD  •  dextrose 5 % and sodium chloride 0.9 % with KCl 20 mEq/L infusion, 150 mL/hr, Intravenous, Continuous PRN, Raj Quinones MD  •  dextrose 5 % and sodium chloride 0.9 % with KCl 40 mEq/L infusion, 150 mL/hr, Intravenous, Continuous PRN, Raj Quinones MD  •  enoxaparin (LOVENOX) syringe 40 mg, 40 mg, Subcutaneous, Q24H, Raj Quinones MD, 40 mg at 10/06/21 1831  •  insulin detemir (LEVEMIR) injection 10 Units, 10 Units, Subcutaneous, Q12H, Raj Quinones MD, 10 Units at 10/06/21 2002  •  insulin regular (HumuLIN R,NovoLIN R) 100 Units in sodium chloride 0.9 % 100 mL (1 Units/mL) infusion, 10 Units/hr, Intravenous, Titrated, Raj Quinones MD, Last Rate: 2 mL/hr at 10/07/21 0742, 2 Units/hr at 10/07/21 0742  •  lisinopril (PRINIVIL,ZESTRIL) tablet 20 mg, 20 mg, Oral, Q24H, Michael Bell Jr., MD, 20 mg at 10/05/21 2157  •  LORazepam (ATIVAN) injection 1 mg, 1 mg, Intravenous, Q4H PRN, Michael Bell Jr., MD, 1 mg at 10/06/21 2155  •  morphine injection 2 mg, 2 mg, Intravenous, Q2H PRN, 2 mg at 10/07/21 0731 **AND** naloxone (NARCAN) injection 0.4 mg, 0.4 mg, Intravenous, Q5 Min PRN, Raj Quinones MD  •  ondansetron (ZOFRAN) injection 4 mg, 4 mg, Intravenous, Q6H PRN, Raj Quinones MD  •  potassium phosphate 45 mmol in sodium chloride 0.9 % 500 mL infusion, 45 mmol, Intravenous, PRN, Last Rate: 62.5 mL/hr at 10/05/21 1345, 45 mmol at 10/05/21 1345 **OR** potassium phosphate 30 mmol in sodium chloride 0.9 % 250 mL infusion, 30 mmol, Intravenous, PRN, Last Rate: 31.3 mL/hr at 10/06/21 2154, 30 mmol at 10/06/21 2154 **OR** Potassium Phosphates 15 mmol in sodium chloride 0.9 % 100 mL infusion, 15 mmol, Intravenous, PRN **OR** sodium phosphates 45 mmol in sodium chloride 0.9 % 500 mL IVPB, 45 mmol, Intravenous, PRN **OR** sodium phosphates 30 mmol in sodium chloride  0.9 % 250 mL IVPB, 30 mmol, Intravenous, PRN **OR** sodium phosphates 15 mmol in sodium chloride 0.9 % 250 mL IVPB, 15 mmol, Intravenous, PRNStacie Sotonte E, MD  •  sodium chloride 0.45 % 1,000 mL with potassium chloride 40 mEq infusion, 250 mL/hr, Intravenous, Continuous PRNStacie Sotonte E, MD  •  sodium chloride 0.45 % infusion, 250 mL/hr, Intravenous, Continuous PRNStacie Sotonte E, MD  •  sodium chloride 0.45 % with KCl 20 mEq/L infusion, 250 mL/hr, Intravenous, Continuous PRNStacie Sotonte E, MD, Stopped at 10/04/21 2352  •  sodium chloride 0.9 % flush 10 mL, 10 mL, Intravenous, PRNStacie Sotonte E, MD  •  sodium chloride 0.9 % flush 10 mL, 10 mL, Intravenous, Once PRN, Raj Quinones MD  •  sodium chloride 0.9 % flush 10 mL, 10 mL, Intravenous, Q12H, Raj Quinones MD, 10 mL at 10/06/21 2027  •  sodium chloride 0.9 % flush 10 mL, 10 mL, Intravenous, PRStacie MOHR Sotonte E, MD  •  sodium chloride 0.9 % infusion, 250 mL/hr, Intravenous, Continuous Stacie DONALD Sotonte E, MD  •  sodium chloride 0.9 % infusion, 10 mL/hr, Intravenous, Continuous Stacie DONALD Sotonte E, MD  •  sodium chloride 0.9 % with KCl 20 mEq/L infusion, 250 mL/hr, Intravenous, Continuous PRStacie MOHR Sotonte E, MD  •  sodium chloride 0.9 % with KCl 40 mEq/L infusion, 250 mL/hr, Intravenous, Continuous PRStacie MOHR Sotonte E, MD    No current facility-administered medications on file prior to encounter.     Current Outpatient Medications on File Prior to Encounter   Medication Sig Dispense Refill   • cloNIDine (CATAPRES) 0.1 MG tablet Take 0.1 mg by mouth Daily.     • cyclobenzaprine (FLEXERIL) 10 MG tablet Take 10 mg by mouth Daily.     • dicyclomine (BENTYL) 20 MG tablet Take 20 mg by mouth Every 6 (Six) Hours.     • Ertugliflozin L-PyroglutamicAc (Steglatro) 15 MG tablet Take 15 mg by mouth Every Morning.     • escitalopram (LEXAPRO) 10 MG tablet Take 10 mg by mouth Daily.     • lisinopril  "(PRINIVIL,ZESTRIL) 20 MG tablet Take 20 mg by mouth Daily.     • simvastatin (ZOCOR) 20 MG tablet Take 20 mg by mouth Every Night.         Medications Discontinued During This Encounter   Medication Reason   • sodium chloride 0.9 % flush 3 mL    • dextrose 5 % and sodium chloride 0.45 % with KCl 20 mEq/L infusion    • insulin detemir (LEVEMIR) injection 10 Units Dose adjustment   • dextrose 5 % with KCl 20 mEq/L infusion Alternate therapy   • dextrose 5 % 1,000 mL with potassium chloride 40 mEq infusion    • dextrose 5 % 1,000 mL with potassium chloride 40 mEq infusion        Review of Systems    Review of Systems   Confused , agitated , on restrains      Objective:   /72 (BP Location: Left arm, Patient Position: Lying)   Pulse 95   Temp 97.2 °F (36.2 °C) (Axillary)   Resp 26   Ht 167.6 cm (66\")   Wt 96.5 kg (212 lb 11.9 oz)   SpO2 97%   BMI 34.34 kg/m²     Physical Exam  Constitutional:       Comments: Awake, confused   HENT:      Head: Normocephalic.      Nose: Nose normal.   Eyes:      General: No scleral icterus.        Right eye: No discharge.         Left eye: No discharge.      Extraocular Movements: Extraocular movements intact.      Conjunctiva/sclera: Conjunctivae normal.   Neck:      Vascular: No carotid bruit.   Cardiovascular:      Rate and Rhythm: Regular rhythm. Tachycardia present.      Pulses: Normal pulses.      Heart sounds: Normal heart sounds.   Pulmonary:      Effort: Pulmonary effort is normal.      Breath sounds: Normal breath sounds.   Abdominal:      General: There is no distension.      Palpations: Abdomen is soft.      Tenderness: There is abdominal tenderness. There is no rebound.   Musculoskeletal:         General: Normal range of motion.      Cervical back: No rigidity or tenderness.      Right lower leg: No edema.      Left lower leg: No edema.   Lymphadenopathy:      Cervical: No cervical adenopathy.   Skin:     Coloration: Skin is not jaundiced or pale.      Findings: " No bruising.   Neurological:      Mental Status: She is disoriented.         Lab Review    Lab Results   Component Value Date    HGBA1C 13.10 (H) 10/04/2021       Lab Results   Component Value Date    GLUCOSE 247 (H) 10/07/2021    CALCIUM 9.7 10/07/2021     (H) 10/07/2021    K 3.6 10/07/2021    CO2 16.0 (L) 10/07/2021     (H) 10/07/2021    BUN 6 10/07/2021    CREATININE 0.81 10/07/2021    EGFRIFNONA 84 10/07/2021    BCR 7.4 10/07/2021    ANIONGAP 18.0 (H) 10/07/2021     Lab Results   Component Value Date    GLUCOSE 247 (H) 10/07/2021    BUN 6 10/07/2021    CREATININE 0.81 10/07/2021    EGFRIFNONA 84 10/07/2021    BCR 7.4 10/07/2021    CO2 16.0 (L) 10/07/2021    CALCIUM 9.7 10/07/2021    ALBUMIN 4.80 10/04/2021    AST 22 10/04/2021    ALT 20 10/04/2021       Lab Results   Component Value Date    WBC 12.69 (H) 10/07/2021    HGB 14.9 10/07/2021    HCT 42.0 10/07/2021    MCV 87.1 10/07/2021     10/07/2021       Lab Results   Component Value Date    TSH 0.135 (L) 10/07/2021           Assessment/Plan     Elevated Gap Acidosis from DKA in presumed T2DM with the use of SGLT2i  Non Gap Acidosis from Hyperchloremia from volume expansion , loss of ketoanions before bicarb could be generated and possible D Lactic Acidosis  If D lactic acidosis is present ( from conversion of ketones to D Lactate ) then it would contribute to both elevated and normal gap acidosis        Delta / Delta , less than 1 implying normal AG acidosis superimposed on DKA     Urine Gap is  low , this would suggest adequate ammonia elimination     Patient developed hypernatremia but present D5W plus K covering adequately water deficit of about 6-7  liter   Insulin Drip addressing both DKA and possible D Lactate    Her urine output is optimal and intially it must have resulted in loss of ketoanions and probably D Lactate and that is why Delta/ Delta was less than 1.   As she has metabolically improved AG has decreased , CO2 has increased  and Delta / Delta is rising.     I was contemplating concomitant low rate bicarb drip to address the non elevated Gap component but she is metabolically improving and her low Urine Anion Gap implies she is able to eliminate acid in the form of ammonia     Keep insulin drip until Gap Closes and I will aid in transitioning to basal , bolus.    Lab assessment to accurately categorize her as either type 1 or type 2 Diabetes.     Mild abnormality in Thyroid Tests imply concomitant transient central hypothyroidism that should resolve       I anticipate lethargy will improve once sodium and acidosis normalize.           I reviewed and summarized records from this admission and I reviewed / ordered labs.       Please see my above opinion and suggestions.         This document has been electronically signed by Christopher Holliday MD on October 7, 2021 07:48 CDT          Electronically signed by hCristopher Cam MD at 10/07/21 0808

## 2021-10-11 NOTE — DISCHARGE SUMMARY
[facility] Medicine Services  DISCHARGE SUMMARY       Date of Admission: 10/4/2021  Date of Discharge:  10/11/2021  Primary Care Physician: Bj Duckworth MD    Presenting Problem/History of Present Illness:  DKA (diabetic ketoacidosis) ([facility]) [E11.10]     Final Discharge Diagnoses:  Active Hospital Problems    Diagnosis    • **DKA (diabetic ketoacidosis) ([facility])    • Acute UTI (urinary tract infection)    • Sepsis, unspecified organism ([facility])        Consults:   Consults     Date and Time Order Name Status Description    10/6/2021 12:05 PM Inpatient Endocrinology Consult Completed           Procedures Performed: None                Pertinent Test Results:   Lab Results (last 24 hours)     Procedure Component Value Units Date/Time    POC Glucose Once [[id number]]  (Abnormal) Collected: 10/11/21 1015    Specimen: Blood Updated: 10/11/21 1032     Glucose 242 mg/dL      Comment: RN NotifiedOperator: [id number] PUSHPA ALEXISMeter ID: [id number]       CBC & Differential [[id number]]  (Abnormal) Collected: 10/11/21 0449    Specimen: Blood Updated: 10/11/21 0704    Narrative:      The following orders were created for panel order CBC & Differential.  Procedure                               Abnormality         Status                     ---------                               -----------         ------                     CBC Auto Differential[[id number]]        Abnormal            Final result               Scan Slide[[id number]]                                       Final result                 Please view results for these tests on the individual orders.    Scan Slide [[id number]] Collected: 10/11/21 0449    Specimen: Blood Updated: 10/11/21 0704     RBC Morphology Normal     WBC Morphology Normal     Platelet Estimate Decreased    POC Glucose Once [[id number]]  (Abnormal) Collected: 10/11/21 0622    Specimen: Blood Updated: 10/11/21 0651     Glucose 153 mg/dL      Comment: RN  NotifiedOperator: [id number] GEETA ELRITAMeter ID: [id number]       Basic Metabolic Panel [[id number]]  (Abnormal) Collected: 10/11/21 0449    Specimen: Blood Updated: 10/11/21 0608     Glucose 172 mg/dL      BUN 7 mg/dL      Creatinine 0.45 mg/dL      Sodium 141 mmol/L      Potassium 3.5 mmol/L      Comment: Slight hemolysis detected by analyzer. Results may be affected.        Chloride 104 mmol/L      CO2 21.0 mmol/L      Calcium 9.2 mg/dL      eGFR Non African Amer >150 mL/min/1.73      BUN/Creatinine Ratio 15.6     Anion Gap 16.0 mmol/L     Narrative:      GFR Normal >60  Chronic Kidney Disease <60  Kidney Failure <15      CBC Auto Differential [[id number]]  (Abnormal) Collected: 10/11/21 0449    Specimen: Blood Updated: 10/11/21 0546     WBC 8.43 10*3/mm3      RBC 4.29 10*6/mm3      Hemoglobin 13.2 g/dL      Hematocrit 38.8 %      MCV 90.4 fL      MCH 30.8 pg      MCHC 34.0 g/dL      RDW 13.0 %      RDW-SD 41.9 fl      MPV 12.7 fL      Platelets 128 10*3/mm3      Neutrophil % 61.2 %      Lymphocyte % 27.6 %      Monocyte % 8.1 %      Eosinophil % 1.4 %      Basophil % 0.2 %      Immature Grans % 1.5 %      Neutrophils, Absolute 5.15 10*3/mm3      Lymphocytes, Absolute 2.33 10*3/mm3      Monocytes, Absolute 0.68 10*3/mm3      Eosinophils, Absolute 0.12 10*3/mm3      Basophils, Absolute 0.02 10*3/mm3      Immature Grans, Absolute 0.13 10*3/mm3      nRBC 0.0 /100 WBC     POC Glucose Once [[id number]]  (Abnormal) Collected: 10/11/21 0223    Specimen: Blood Updated: 10/11/21 0239     Glucose 152 mg/dL      Comment: RN NotifiedOperator: [id number] EVANS Allison ID: [id number]       POC Glucose Once [[id number]]  (Abnormal) Collected: 10/10/21 1933    Specimen: Blood Updated: 10/11/21 0043     Glucose 234 mg/dL      Comment: RN NotifiedOperator: [id number] HERNDON ELRITAMeter ID: [id number]       Potassium [[id number]]  (Normal) Collected: 10/10/21 1631    Specimen: Blood Updated: 10/10/21 1709     Potassium  4.3 mmol/L      Comment: Slight hemolysis detected by analyzer. Results may be affected.       POC Glucose Once [[id number]]  (Abnormal) Collected: 10/10/21 1647    Specimen: Blood Updated: 10/10/21 1659     Glucose 145 mg/dL      Comment: RN NotifiedOperator: [id number] BARROW ALEXISMeter ID: [id number]       POC Glucose Once [[id number]]  (Abnormal) Collected: 10/10/21 1031    Specimen: Blood Updated: 10/10/21 1135     Glucose 236 mg/dL      Comment: RN NotifiedOperator: [id number] BARROW ALEXISMeter ID: [id number]       POC Glucose Once [[id number]]  (Normal) Collected: 10/10/21 0604    Specimen: Blood Updated: 10/10/21 1134     Glucose 125 mg/dL      Comment: RN NotifiedOperator: [id number] LAURA SYBILMeter ID: [id number]           Imaging Results (Last 72 Hours)     Procedure Component Value Units Date/Time    CT Angiogram Chest [[id number]] Collected: 10/09/21 1759     Updated: 10/09/21 1953    Narrative:      EXAM:  CT CHEST ANGIOGRAPHY WITH IV CONTRAST    ORDERING PROVIDER:  MARILY SERRATO    CLINICAL HISTORY:  Shortness of breath     COMPARISON:      TECHNIQUE:   Chest CT was performed using a high resolution pulmonary  angiogram protocol with 60ml of Isovue 370 as IV contrast and  reformatted in the sagittal and coronal planes.     3-dimensional images also acquired with special processing of the  CT scan data with a specialized workstation for evaluation.    This examination was performed according to our departmental dose  optimization program which includes automated exposure control,  adjustment of the MA and kV according to patient size, and/or use  of iterative reconstruction technique.     FINDINGS:     LUNGS AND PLEURA: No atelectasis, scar, consolidation or  masses.No pleural effusion or plaques.  Normal interstitium.    HEART: Normal size and configuration. No pericardial effusion.     MEDIASTINUM AND RANDA: No significant adenopathy. Left hilar lymph  node with short axis measuring less  "than 1 cm.    AORTA AND GREAT VESSELS: No aneurysm or dissection.     PULMONARY ARTERIES: No filling defects.     UPPER ABDOMEN: Diffuse fatty change of liver. Small gallstones in  the gallbladder..    MUSCULOSKELETAL:  Unremarkable. Normal vertebral body height and  alignment. No lytic or sclerotic lesion.     EXTRATHORACIC SOFT TISSUES: No axillary adenopathy. No mass.  Unremarkable supraclavicular soft tissues.       Impression:      1.  No evidence of pulmonary embolism.  2.  Diffuse fatty change of liver.   3.  Small gallstones in the gallbladder..  4.  Physiologic size lymph nodes in the left hilum.    Electronically signed by:  Man Taylor MD  10/9/2021 7:51 PM CDT  Workstation: [id number]            Chief Complaint on Day of Discharge: None.    Hospital Course:  The patient was admitted for DKA and started on the treatment protocol.  DKA did resolve and she was transitioned to basal insulin with Accu-Cheks and sliding scale insulin.  She was seen by Dr. Holliday on consult and appropriate recommendations were made.  She was cleared for discharge by Dr. Holliday and recommended Levemir 25 units nightly and Novolin R 6 units 3 times daily with meals.  She will be seen for follow-up by Dr. Holliday in 2 weeks.    Patient also had acute cystitis and was treated with IV antibiotics.  Blood culture showed no growth and antibiotic was transitioned to Omnicef on discharge.      Condition on Discharge: Stable and improved.    Physical Exam on Discharge:  /89 (BP Location: Left arm, Patient Position: Sitting)   Pulse 94 Comment: Simultaneous filing. User may be unaware of other data.  Temp 97.3 °F (36.3 °C) (Temporal)   Resp 18   Ht 167.6 cm (66\")   Wt 107 kg (235 lb 12.8 oz)   SpO2 94%   BMI 38.06 kg/m²   Physical Exam  Vitals and nursing note reviewed.   Constitutional:       General: She is not in acute distress.     Appearance: She is well-developed. She is obese. She is not diaphoretic.   HENT:      " Head: Normocephalic and atraumatic.      Right Ear: External ear normal.      Left Ear: External ear normal.      Nose: Nose normal.   Eyes:      Extraocular Movements: Extraocular movements intact.      Conjunctiva/sclera: Conjunctivae normal.      Pupils: Pupils are equal, round, and reactive to light.   Neck:      Thyroid: No thyromegaly.      Vascular: No JVD.   Cardiovascular:      Rate and Rhythm: Normal rate and regular rhythm.      Heart sounds: Normal heart sounds. No murmur heard.  No friction rub. No gallop.    Pulmonary:      Effort: Pulmonary effort is normal. No respiratory distress.      Breath sounds: Normal breath sounds. No stridor. No wheezing or rales.   Chest:      Chest wall: No tenderness.   Abdominal:      General: Bowel sounds are normal. There is no distension.      Palpations: Abdomen is soft. There is no mass.      Tenderness: There is no abdominal tenderness. There is no right CVA tenderness, left CVA tenderness, guarding or rebound.      Hernia: No hernia is present.   Musculoskeletal:         General: No swelling, tenderness or deformity. Normal range of motion.      Cervical back: Normal range of motion and neck supple.      Right lower leg: No edema.      Left lower leg: No edema.   Skin:     General: Skin is warm and dry.      Coloration: Skin is not jaundiced or pale.      Findings: No bruising, erythema, lesion or rash.   Neurological:      General: No focal deficit present.      Mental Status: She is alert and oriented to person, place, and time. Mental status is at baseline.      Cranial Nerves: No cranial nerve deficit.      Sensory: No sensory deficit.      Motor: No weakness.      Coordination: Coordination normal.      Gait: Gait normal.      Deep Tendon Reflexes: Reflexes are normal and symmetric. Reflexes normal.   Psychiatric:         Mood and Affect: Mood normal.         Behavior: Behavior normal. Behavior is cooperative.         Thought Content: Thought content normal.          Judgment: Judgment normal.           Discharge Disposition:  Home or Self Care    Discharge Medications:     Discharge Medications      New Medications      Instructions Start Date   insulin detemir 100 UNIT/ML injection  Commonly known as: LEVEMIR   25 Units, Subcutaneous, Every Morning   Start Date: October 12, 2021     insulin regular 100 UNIT/ML injection  Commonly known as: humuLIN R,novoLIN R   6 Units, Subcutaneous, 3 Times Daily With Meals         Continue These Medications      Instructions Start Date   cloNIDine 0.1 MG tablet  Commonly known as: CATAPRES   0.1 mg, Oral, Daily      cyclobenzaprine 10 MG tablet  Commonly known as: FLEXERIL   10 mg, Oral, Daily      dicyclomine 20 MG tablet  Commonly known as: BENTYL   20 mg, Oral, Every 6 Hours      escitalopram 10 MG tablet  Commonly known as: LEXAPRO   10 mg, Oral, Daily      lisinopril 20 MG tablet  Commonly known as: PRINIVIL,ZESTRIL   20 mg, Oral, Daily      simvastatin 20 MG tablet  Commonly known as: ZOCOR   20 mg, Oral, Nightly         Stop These Medications    Steglatro 15 MG tablet  Generic drug: Ertugliflozin L-PyroglutamicAc            Discharge Diet: Diabetic diet     Activity at Discharge: As tolerated.     Discharge Care Plan/Instructions: Patient has been advised to take her medications as prescribed and to return to the emergency room in the event of any worsening symptoms.    Follow-up Appointments: Follow-up with primary care provider in 1 week and with Dr. Holliday in 2 weeks.    Test Results Pending at Discharge:   Pending Labs     Order Current Status    D- LACTATE ( NOT CONVENTIONAL LACTIC ACID THAT MEASURE L- LACTATE) W NEXT SET OF LABS - Miscellaneous Test In process    Glutamic Acid Decarboxylase In process              Samuel Brock MD  10/11/21  10:40 CDT    Time: 35 minutes.

## 2021-10-12 ENCOUNTER — READMISSION MANAGEMENT (OUTPATIENT)
Dept: CALL CENTER | Facility: HOSPITAL | Age: 31
End: 2021-10-12

## 2021-10-12 LAB
GLUCOSE BLDC GLUCOMTR-MCNC: 113 MG/DL (ref 70–130)
GLUCOSE BLDC GLUCOMTR-MCNC: 153 MG/DL (ref 70–130)
GLUCOSE BLDC GLUCOMTR-MCNC: 167 MG/DL (ref 70–130)
GLUCOSE BLDC GLUCOMTR-MCNC: 174 MG/DL (ref 70–130)
GLUCOSE BLDC GLUCOMTR-MCNC: 174 MG/DL (ref 70–130)
GLUCOSE BLDC GLUCOMTR-MCNC: 203 MG/DL (ref 70–130)
REF LAB TEST RESULTS: NORMAL

## 2021-10-12 NOTE — OUTREACH NOTE
Medical Week 1 Survey      Responses   [facility] patient discharged from? [facility]   Does the patient have one of the following disease processes/diagnoses(primary or secondary)? Other   Week 1 attempt successful? Yes   Call start time 1434   Call end time 1438   Discharge diagnosis DKA    Is patient permission given to speak with other caregiver? No   Meds reviewed with patient/caregiver? Yes   Is the patient having any side effects they believe may be caused by any medication additions or changes? No   Does the patient have all medications ordered at discharge? Yes   Is the patient taking all medications as directed (includes completed medication regime)? Yes   Does the patient have a primary care provider?  Yes   Does the patient have an appointment with their PCP within 7 days of discharge? Yes   Has the patient kept scheduled appointments due by today? Yes   Comments She has an appt on Monday 10/18/2021- to see her PCP.   What is the Home health agency?  [facility]   Has home health visited the patient within 72 hours of discharge? Yes   Psychosocial issues? No   Did the patient receive a copy of their discharge instructions? Yes   Nursing interventions Reviewed instructions with patient   What is the patient's perception of their health status since discharge? Improving   Is the patient/caregiver able to teach back signs and symptoms related to disease process for when to call PCP? Yes   Is the patient/caregiver able to teach back signs and symptoms related to disease process for when to call 911? Yes   Is the patient/caregiver able to teach back the hierarchy of who to call/visit for symptoms/problems? PCP, Specialist, Home health nurse, Urgent Care, ED, 911 Yes   If the patient is a current smoker, are they able to teach back resources for cessation? [id number]   Additional teach back comments BS is 278   Week 1 call completed? Yes          Shanti Sotomayor RN

## 2021-10-12 NOTE — PAYOR COMM NOTE
"Melly Pena  Case Management Extender  [phone] phone  [phone] fax    Ref# [id number]    Dudley Bose (30 y.o. Female)             Date of Birth Social Security Number Address Home Phone MRN    [id number]  [facility] [phone] [id number]    Druze Marital Status             [facility] Single       Admission Date Admission Type Admitting Provider Attending Provider Department, Room/Bed    10/4/21 Urgent Raj Quinones MD  [address], 305/1    Discharge Date Discharge Disposition Discharge Destination          10/11/2021 Home or Self Care              Attending Provider: (none)   Allergies: Hydroxyquinolines    Isolation: None   Infection: None   Code Status: Prior   Advance Care Planning Activity    Ht: 167.6 cm (66\")   Wt: 107 kg (235 lb 12.8 oz)    Admission Cmt: None   Principal Problem: DKA (diabetic ketoacidosis) (HCC) [E11.10]                 Active Insurance as of 10/4/2021     Primary Coverage     Payor Plan Insurance Group Employer/Plan Group    [employer] [place] [facility] [place]      Payor Plan Address Payor Plan Phone Number Payor Plan Fax Number Effective Dates    [address] [address]   11/1/2018 - None Entered    [place] [address]       Subscriber Name Subscriber Birth Date Member ID       DUDLEY BOSE 1990 [id number]                 Emergency Contacts      (Rel.) Home Phone Work Phone Mobile Phone    JAZMYN BOSE (Sister) [phone] -- [phone]               Discharge Summary      Samuel Brock MD at 10/11/21 1039              [facility] Medicine Services  DISCHARGE SUMMARY       Date of Admission: 10/4/2021  Date of Discharge:  10/11/2021  Primary Care Physician: Bj Duckworth MD    Presenting Problem/History of Present Illness:  DKA (diabetic ketoacidosis) (HCC) [E11.10]     Final Discharge Diagnoses:  Active " Hospital Problems    Diagnosis    • **DKA (diabetic ketoacidosis) ([facility])    • Acute UTI (urinary tract infection)    • Sepsis, unspecified organism ([facility])        Consults:   Consults     Date and Time Order Name Status Description    10/6/2021 12:05 PM Inpatient Endocrinology Consult Completed           Procedures Performed: None                Pertinent Test Results:   Lab Results (last 24 hours)     Procedure Component Value Units Date/Time    POC Glucose Once [[id number]]  (Abnormal) Collected: 10/11/21 1015    Specimen: Blood Updated: 10/11/21 1032     Glucose 242 mg/dL      Comment: RN NotifiedOperator: [id number] BARROW ALEXISMeter ID: [id number]       CBC & Differential [[id number]]  (Abnormal) Collected: 10/11/21 0449    Specimen: Blood Updated: 10/11/21 0704    Narrative:      The following orders were created for panel order CBC & Differential.  Procedure                               Abnormality         Status                     ---------                               -----------         ------                     CBC Auto Differential[[id number]]        Abnormal            Final result               Scan Slide[[id number]]                                       Final result                 Please view results for these tests on the individual orders.    Scan Slide [[id number]] Collected: 10/11/21 0449    Specimen: Blood Updated: 10/11/21 0704     RBC Morphology Normal     WBC Morphology Normal     Platelet Estimate Decreased    POC Glucose Once [[id number]]  (Abnormal) Collected: 10/11/21 0622    Specimen: Blood Updated: 10/11/21 0651     Glucose 153 mg/dL      Comment: RN NotifiedOperator: [id number] GEETA ELRITAMeter ID: [id number]       Basic Metabolic Panel [[id number]]  (Abnormal) Collected: 10/11/21 0449    Specimen: Blood Updated: 10/11/21 0608     Glucose 172 mg/dL      BUN 7 mg/dL      Creatinine 0.45 mg/dL      Sodium 141 mmol/L      Potassium 3.5 mmol/L      Comment: Slight hemolysis detected by  analyzer. Results may be affected.        Chloride 104 mmol/L      CO2 21.0 mmol/L      Calcium 9.2 mg/dL      eGFR Non African Amer >150 mL/min/1.73      BUN/Creatinine Ratio 15.6     Anion Gap 16.0 mmol/L     Narrative:      GFR Normal >60  Chronic Kidney Disease <60  Kidney Failure <15      CBC Auto Differential [[id number]]  (Abnormal) Collected: 10/11/21 0449    Specimen: Blood Updated: 10/11/21 0546     WBC 8.43 10*3/mm3      RBC 4.29 10*6/mm3      Hemoglobin 13.2 g/dL      Hematocrit 38.8 %      MCV 90.4 fL      MCH 30.8 pg      MCHC 34.0 g/dL      RDW 13.0 %      RDW-SD 41.9 fl      MPV 12.7 fL      Platelets 128 10*3/mm3      Neutrophil % 61.2 %      Lymphocyte % 27.6 %      Monocyte % 8.1 %      Eosinophil % 1.4 %      Basophil % 0.2 %      Immature Grans % 1.5 %      Neutrophils, Absolute 5.15 10*3/mm3      Lymphocytes, Absolute 2.33 10*3/mm3      Monocytes, Absolute 0.68 10*3/mm3      Eosinophils, Absolute 0.12 10*3/mm3      Basophils, Absolute 0.02 10*3/mm3      Immature Grans, Absolute 0.13 10*3/mm3      nRBC 0.0 /100 WBC     POC Glucose Once [[id number]]  (Abnormal) Collected: 10/11/21 0223    Specimen: Blood Updated: 10/11/21 0239     Glucose 152 mg/dL      Comment: RN NotifiedOperator: [id number] [facility] ID: [id number]       POC Glucose Once [[id number]]  (Abnormal) Collected: 10/10/21 1933    Specimen: Blood Updated: 10/11/21 0043     Glucose 234 mg/dL      Comment: RN NotifiedOperator: [id number] GEETA ELRITAMeter ID: [id number]       Potassium [[id number]]  (Normal) Collected: 10/10/21 1631    Specimen: Blood Updated: 10/10/21 1709     Potassium 4.3 mmol/L      Comment: Slight hemolysis detected by analyzer. Results may be affected.       POC Glucose Once [[id number]]  (Abnormal) Collected: 10/10/21 1647    Specimen: Blood Updated: 10/10/21 1659     Glucose 145 mg/dL      Comment: RN NotifiedOperator: [id number] PUSHPA VERDUZCOISMeter ID: [id number]       POC Glucose Once [[id number]]   (Abnormal) Collected: 10/10/21 1031    Specimen: Blood Updated: 10/10/21 1135     Glucose 236 mg/dL      Comment: RN NotifiedOperator: [id number] PUSHPA VERDUZCOISMeter ID: [id number]       POC Glucose Once [[id number]]  (Normal) Collected: 10/10/21 0604    Specimen: Blood Updated: 10/10/21 1134     Glucose 125 mg/dL      Comment: RN NotifiedOperator: [id number] LAURA SYBILMeter ID: [id number]           Imaging Results (Last 72 Hours)     Procedure Component Value Units Date/Time    CT Angiogram Chest [[id number]] Collected: 10/09/21 1759     Updated: 10/09/21 1953    Narrative:      EXAM:  CT CHEST ANGIOGRAPHY WITH IV CONTRAST    ORDERING PROVIDER:  MARILY SERRATO    CLINICAL HISTORY:  Shortness of breath     COMPARISON:      TECHNIQUE:   Chest CT was performed using a high resolution pulmonary  angiogram protocol with 60ml of Isovue 370 as IV contrast and  reformatted in the sagittal and coronal planes.     3-dimensional images also acquired with special processing of the  CT scan data with a specialized workstation for evaluation.    This examination was performed according to our departmental dose  optimization program which includes automated exposure control,  adjustment of the MA and kV according to patient size, and/or use  of iterative reconstruction technique.     FINDINGS:     LUNGS AND PLEURA: No atelectasis, scar, consolidation or  masses.No pleural effusion or plaques.  Normal interstitium.    HEART: Normal size and configuration. No pericardial effusion.     MEDIASTINUM AND RANDA: No significant adenopathy. Left hilar lymph  node with short axis measuring less than 1 cm.    AORTA AND GREAT VESSELS: No aneurysm or dissection.     PULMONARY ARTERIES: No filling defects.     UPPER ABDOMEN: Diffuse fatty change of liver. Small gallstones in  the gallbladder..    MUSCULOSKELETAL:  Unremarkable. Normal vertebral body height and  alignment. No lytic or sclerotic lesion.     EXTRATHORACIC SOFT TISSUES: No  "axillary adenopathy. No mass.  Unremarkable supraclavicular soft tissues.       Impression:      1.  No evidence of pulmonary embolism.  2.  Diffuse fatty change of liver.   3.  Small gallstones in the gallbladder..  4.  Physiologic size lymph nodes in the left hilum.    Electronically signed by:  Man Taylor MD  10/9/2021 7:51 PM CDT  Workstation: [id number]            Chief Complaint on Day of Discharge: None.    Hospital Course:  The patient was admitted for DKA and started on the treatment protocol.  DKA did resolve and she was transitioned to basal insulin with Accu-Cheks and sliding scale insulin.  She was seen by Dr. Holliday on consult and appropriate recommendations were made.  She was cleared for discharge by Dr. Holliday and recommended Levemir 25 units nightly and Novolin R 6 units 3 times daily with meals.  She will be seen for follow-up by Dr. Holliday in 2 weeks.    Patient also had acute cystitis and was treated with IV antibiotics.  Blood culture showed no growth and antibiotic was transitioned to Omnicef on discharge.      Condition on Discharge: Stable and improved.    Physical Exam on Discharge:  /89 (BP Location: Left arm, Patient Position: Sitting)   Pulse 94 Comment: Simultaneous filing. User may be unaware of other data.  Temp 97.3 °F (36.3 °C) (Temporal)   Resp 18   Ht 167.6 cm (66\")   Wt 107 kg (235 lb 12.8 oz)   SpO2 94%   BMI 38.06 kg/m²   Physical Exam  Vitals and nursing note reviewed.   Constitutional:       General: She is not in acute distress.     Appearance: She is well-developed. She is obese. She is not diaphoretic.   HENT:      Head: Normocephalic and atraumatic.      Right Ear: External ear normal.      Left Ear: External ear normal.      Nose: Nose normal.   Eyes:      Extraocular Movements: Extraocular movements intact.      Conjunctiva/sclera: Conjunctivae normal.      Pupils: Pupils are equal, round, and reactive to light.   Neck:      Thyroid: No thyromegaly. "      Vascular: No JVD.   Cardiovascular:      Rate and Rhythm: Normal rate and regular rhythm.      Heart sounds: Normal heart sounds. No murmur heard.  No friction rub. No gallop.    Pulmonary:      Effort: Pulmonary effort is normal. No respiratory distress.      Breath sounds: Normal breath sounds. No stridor. No wheezing or rales.   Chest:      Chest wall: No tenderness.   Abdominal:      General: Bowel sounds are normal. There is no distension.      Palpations: Abdomen is soft. There is no mass.      Tenderness: There is no abdominal tenderness. There is no right CVA tenderness, left CVA tenderness, guarding or rebound.      Hernia: No hernia is present.   Musculoskeletal:         General: No swelling, tenderness or deformity. Normal range of motion.      Cervical back: Normal range of motion and neck supple.      Right lower leg: No edema.      Left lower leg: No edema.   Skin:     General: Skin is warm and dry.      Coloration: Skin is not jaundiced or pale.      Findings: No bruising, erythema, lesion or rash.   Neurological:      General: No focal deficit present.      Mental Status: She is alert and oriented to person, place, and time. Mental status is at baseline.      Cranial Nerves: No cranial nerve deficit.      Sensory: No sensory deficit.      Motor: No weakness.      Coordination: Coordination normal.      Gait: Gait normal.      Deep Tendon Reflexes: Reflexes are normal and symmetric. Reflexes normal.   Psychiatric:         Mood and Affect: Mood normal.         Behavior: Behavior normal. Behavior is cooperative.         Thought Content: Thought content normal.         Judgment: Judgment normal.           Discharge Disposition:  Home or Self Care    Discharge Medications:     Discharge Medications      New Medications      Instructions Start Date   insulin detemir 100 UNIT/ML injection  Commonly known as: LEVEMIR   25 Units, Subcutaneous, Every Morning   Start Date: October 12, 2021     insulin  regular 100 UNIT/ML injection  Commonly known as: humuLIN R,novoLIN R   6 Units, Subcutaneous, 3 Times Daily With Meals         Continue These Medications      Instructions Start Date   cloNIDine 0.1 MG tablet  Commonly known as: CATAPRES   0.1 mg, Oral, Daily      cyclobenzaprine 10 MG tablet  Commonly known as: FLEXERIL   10 mg, Oral, Daily      dicyclomine 20 MG tablet  Commonly known as: BENTYL   20 mg, Oral, Every 6 Hours      escitalopram 10 MG tablet  Commonly known as: LEXAPRO   10 mg, Oral, Daily      lisinopril 20 MG tablet  Commonly known as: PRINIVIL,ZESTRIL   20 mg, Oral, Daily      simvastatin 20 MG tablet  Commonly known as: ZOCOR   20 mg, Oral, Nightly         Stop These Medications    Steglatro 15 MG tablet  Generic drug: Ertugliflozin L-PyroglutamicAc            Discharge Diet: Diabetic diet     Activity at Discharge: As tolerated.     Discharge Care Plan/Instructions: Patient has been advised to take her medications as prescribed and to return to the emergency room in the event of any worsening symptoms.    Follow-up Appointments: Follow-up with primary care provider in 1 week and with Dr. Holliday in 2 weeks.    Test Results Pending at Discharge:   Pending Labs     Order Current Status    D- LACTATE ( NOT CONVENTIONAL LACTIC ACID THAT MEASURE L- LACTATE) W NEXT SET OF LABS - Miscellaneous Test In process    Glutamic Acid Decarboxylase In process              Samuel Brock MD  10/11/21  10:40 CDT    Time: 35 minutes.                Electronically signed by Samuel Brock MD at 10/11/21 1111

## 2021-10-12 NOTE — OUTREACH NOTE
Prep Survey      Responses   [facility]? [place]   Is LACE score < 7 ? No   Emergency Room discharge w/ pulse ox? No   Eligibility Readm Mgmt   Discharge diagnosis DKA    Does the patient have one of the following disease processes/diagnoses(primary or secondary)? Other   Does the patient have Home health ordered? Yes   What is the Home health agency?  [facility]   Is there a DME ordered? No   Prep survey completed? Yes          LETICIA Castellanos RN

## 2021-10-19 ENCOUNTER — READMISSION MANAGEMENT (OUTPATIENT)
Dept: CALL CENTER | Facility: HOSPITAL | Age: 31
End: 2021-10-19

## 2021-10-19 NOTE — OUTREACH NOTE
Medical Week 2 Survey      Responses   [facility] patient discharged from? [place]   Does the patient have one of the following disease processes/diagnoses(primary or secondary)? Other   Week 2 attempt successful? No   Unsuccessful attempts Attempt 1          Daphne Bautista RN

## 2021-10-21 ENCOUNTER — READMISSION MANAGEMENT (OUTPATIENT)
Dept: CALL CENTER | Facility: HOSPITAL | Age: 31
End: 2021-10-21

## 2021-10-21 NOTE — OUTREACH NOTE
Medical Week 2 Survey      Responses   [facility] patient discharged from? [place]   Does the patient have one of the following disease processes/diagnoses(primary or secondary)? Other   Week 2 attempt successful? Yes   Call start time 1508   Call end time 1511   Meds reviewed with patient/caregiver? Yes   Is the patient taking all medications as directed (includes completed medication regime)? Yes   Has the patient kept scheduled appointments due by today? Yes   Comments Has seen Yari Viveros 2 times since home, on 10/29 Endrocrine     Comments HH comes, Blood sugars running good in 100's, , checking sugars 4 times day   What is the patient's perception of their health status since discharge? Improving   Week 2 Call Completed? Yes   Wrap up additional comments Pt. states is doing better, just tired, drinking more water, not doing much, but is walking some. no new issues, no questions. today          Tram Salinas RN

## 2021-10-28 ENCOUNTER — READMISSION MANAGEMENT (OUTPATIENT)
Dept: CALL CENTER | Facility: HOSPITAL | Age: 31
End: 2021-10-28

## 2021-10-28 NOTE — OUTREACH NOTE
Medical Week 3 Survey      Responses   [facility] patient discharged from? [place]   Does the patient have one of the following disease processes/diagnoses(primary or secondary)? Other   Week 3 attempt successful? Yes   Call start time 1534   Call end time 1543   Discharge diagnosis DKA, acute UTI, sepsis   Is patient permission given to speak with other caregiver? No   Meds reviewed with patient/caregiver? Yes   Does the patient have all medications ordered at discharge? Yes   Is the patient taking all medications as directed (includes completed medication regime)? Yes   Comments regarding appointments Hospital Follow Up with Christopher Holliday MD Friday Oct 29, 2021 8:00 AM   Does the patient have a primary care provider?  Yes   Comments regarding PCP SHARONDA Hart PCP   Has the patient kept scheduled appointments due by today? Yes   What is the Home health agency?  [facility]   Psychosocial issues? No   Comments Patient reports that she completely changed her diet and blood sugars are in 100's. Patient reports that she is not needing mealtime insulin.    Did the patient receive a copy of their discharge instructions? Yes   Nursing interventions Reviewed instructions with patient   What is the patient's perception of their health status since discharge? Improving   Is the patient/caregiver able to teach back signs and symptoms related to disease process for when to call PCP? Yes   Is the patient/caregiver able to teach back signs and symptoms related to disease process for when to call 911? Yes   Is the patient/caregiver able to teach back the hierarchy of who to call/visit for symptoms/problems? PCP, Specialist, Home health nurse, Urgent Care, ED, 911 Yes   Week 3 Call Completed? Yes          Cherelle Barreto RN

## 2021-10-29 ENCOUNTER — OFFICE VISIT (OUTPATIENT)
Dept: ENDOCRINOLOGY | Facility: CLINIC | Age: 31
End: 2021-10-29

## 2021-10-29 VITALS
HEIGHT: 66 IN | BODY MASS INDEX: 37.77 KG/M2 | OXYGEN SATURATION: 100 % | WEIGHT: 235 LBS | DIASTOLIC BLOOD PRESSURE: 66 MMHG | SYSTOLIC BLOOD PRESSURE: 102 MMHG | HEART RATE: 85 BPM

## 2021-10-29 DIAGNOSIS — E11.65 TYPE 2 DIABETES MELLITUS WITH HYPERGLYCEMIA, WITH LONG-TERM CURRENT USE OF INSULIN (HCC): Primary | ICD-10-CM

## 2021-10-29 DIAGNOSIS — Z79.4 TYPE 2 DIABETES MELLITUS WITH HYPERGLYCEMIA, WITH LONG-TERM CURRENT USE OF INSULIN (HCC): Primary | ICD-10-CM

## 2021-10-29 PROCEDURE — 99214 OFFICE O/P EST MOD 30 MIN: CPT | Performed by: INTERNAL MEDICINE

## 2021-10-29 RX ORDER — GABAPENTIN 600 MG/1
600 TABLET ORAL 2 TIMES DAILY
COMMUNITY

## 2021-10-29 RX ORDER — PROCHLORPERAZINE 25 MG/1
1 SUPPOSITORY RECTAL ONCE
Qty: 1 EACH | Refills: 1 | Status: SHIPPED | OUTPATIENT
Start: 2021-10-29 | End: 2021-10-29

## 2021-10-29 RX ORDER — PROCHLORPERAZINE 25 MG/1
SUPPOSITORY RECTAL AS NEEDED
Qty: 9 EACH | Refills: 3 | Status: SHIPPED | OUTPATIENT
Start: 2021-10-29 | End: 2022-09-02

## 2021-10-29 RX ORDER — DIAZEPAM 10 MG/1
10 TABLET ORAL 2 TIMES DAILY PRN
COMMUNITY

## 2021-10-29 RX ORDER — PROCHLORPERAZINE 25 MG/1
1 SUPPOSITORY RECTAL ONCE
Qty: 1 EACH | Refills: 3 | Status: SHIPPED | OUTPATIENT
Start: 2021-10-29 | End: 2021-10-29

## 2021-10-29 RX ORDER — TRAMADOL HYDROCHLORIDE 50 MG/1
50 TABLET ORAL EVERY 6 HOURS PRN
COMMUNITY
End: 2023-02-08

## 2021-11-05 ENCOUNTER — READMISSION MANAGEMENT (OUTPATIENT)
Dept: CALL CENTER | Facility: HOSPITAL | Age: 31
End: 2021-11-05

## 2021-11-05 NOTE — OUTREACH NOTE
Medical Week 4 Survey      Responses   [facility] [place] patient discharged from? [place]   Does the patient have one of the following disease processes/diagnoses(primary or secondary)? Other   Week 4 attempt successful? Yes   Call start time 1546   Call end time 1547   Discharge diagnosis DKA, acute UTI, sepsis   Meds reviewed with patient/caregiver? Yes   Is the patient taking all medications as directed (includes completed medication regime)? Yes   Has the patient kept scheduled appointments due by today? Yes   Is the patient still receiving Home Health Services? N/A   What is the patient's perception of their health status since discharge? Improving   Week 4 Call Completed? Yes   Would the patient like one additional call? Yes   Wrap up additional comments Doing well.   this am.          Michelle Lambert, RN

## 2021-11-08 NOTE — PROGRESS NOTES
"Chief Complaint   Patient presents with   • Diabetes     t2       History of Present Illness    31 y.o. female     Diabetes Type 2    Duration - 5 y    Complications - recent hospitalization, Oct 2021 , severe DKA    Present Monitoring -      Fingersticks - 4 x daily      CGM -     Present Regimen - insulin     Carb Intake - variable, trying to eat very low carb     Exercise - none     Symptoms - fatigue     ==========================================  Physical Exam  /66   Pulse 85   Ht 167.6 cm (66\")   Wt 107 kg (235 lb)   SpO2 100%   BMI 37.93 kg/m²   AOx3  No goiter, no carotid bruit  RRR  CTA  No Edema     ==========================================    Laboratory Workup    Lab Results   Component Value Date    WBC 8.43 10/11/2021    HGB 13.2 10/11/2021    HCT 38.8 10/11/2021    MCV 90.4 10/11/2021     (L) 10/11/2021       Lab Results   Component Value Date    GLUCOSE 172 (H) 10/11/2021    BUN 7 10/11/2021    CREATININE 0.45 (L) 10/11/2021    EGFRIFNONA >150 10/11/2021    BCR 15.6 10/11/2021    K 3.5 10/11/2021    CO2 21.0 (L) 10/11/2021    CALCIUM 9.2 10/11/2021    ALBUMIN 4.80 10/04/2021    AST 22 10/04/2021    ALT 20 10/04/2021       Lab Results   Component Value Date    EGFRIFNONA >150 10/11/2021         ==========================================      ICD-10-CM ICD-9-CM   1. Type 2 diabetes mellitus with hyperglycemia, with long-term current use of insulin ([facility])  E11.65 250.00    Z79.4 790.29     V58.67       Diabetes Mellitus type 2  Developed DKA in the setting of sglt2 use , diarrhea   c peptide detectalbe and EDVIN neg --------------------------------------------------------------------------------------------------------------------------------------------    Glycemic Management   Lab Results   Component Value Date    HGBA1C 13.10 (H) 10/04/2021       CGM 2 week review       Metformin None , severe diarrhea - led to DKA    GLP 1 Therapy    Start trulicity     SGLT2 inhibitors  Not at this " "point, developed DKA while on it     Basal Insulin   Self titration explained  Prandial Insulin    Carb concept and correction concept explained    Glucagon  rx    Rx Dexcom   ==========================================================================  Microvascular Complication Monitoring    Neuropathy                     -  Retinopathy   pending  Renal     GFR   Lab Results   Component Value Date    EGFRIFNONA >150 10/11/2021    EGFRIFNONA >150 10/10/2021    EGFRIFNONA 147 10/09/2021       Albuminuria   No results found for: MALBCRERATIO     ==========================================================================    Lipids  No results found for: CHOL, CHLPL, TRIG, HDL, LDL, LDLDIRECT    Statin  zocor 20 - next appt crestor   Fibrate    Vascepa    ==========================================================================    HTN  /66   Pulse 85   Ht 167.6 cm (66\")   Wt 107 kg (235 lb)   SpO2 100%   BMI 37.93 kg/m²           ==========================================================================    Bone Health    Vit D    No results found for: UXYZ83RS    Calcium intake     ==========================================================================    Thyroid Assessment  Lab Results   Component Value Date    TSH 0.135 (L) 10/07/2021       restest  ==========================================================================    Vitamin B12  No results found for: [id number]                 No orders of the defined types were placed in this encounter.               This document has been electronically signed by Christopher Holliday MD on November 7, 2021 21:54 CST                      "

## 2022-04-18 ENCOUNTER — TRANSCRIBE ORDERS (OUTPATIENT)
Dept: PHYSICAL THERAPY | Facility: HOSPITAL | Age: 32
End: 2022-04-18

## 2022-04-18 DIAGNOSIS — M76.31 ILIOTIBIAL BAND SYNDROME AFFECTING LOWER LEG, RIGHT: Primary | ICD-10-CM

## 2022-04-18 DIAGNOSIS — S83.281A TEAR OF LATERAL MENISCUS OF RIGHT KNEE, UNSPECIFIED TEAR TYPE, UNSPECIFIED WHETHER OLD OR CURRENT TEAR, INITIAL ENCOUNTER: ICD-10-CM

## 2022-09-02 ENCOUNTER — OFFICE VISIT (OUTPATIENT)
Dept: INTERNAL MEDICINE | Facility: CLINIC | Age: 32
End: 2022-09-02

## 2022-09-02 ENCOUNTER — LAB (OUTPATIENT)
Dept: LAB | Facility: HOSPITAL | Age: 32
End: 2022-09-02

## 2022-09-02 VITALS
SYSTOLIC BLOOD PRESSURE: 145 MMHG | BODY MASS INDEX: 41.64 KG/M2 | HEART RATE: 84 BPM | OXYGEN SATURATION: 98 % | WEIGHT: 259.1 LBS | HEIGHT: 66 IN | RESPIRATION RATE: 15 BRPM | TEMPERATURE: 98.4 F | DIASTOLIC BLOOD PRESSURE: 80 MMHG

## 2022-09-02 DIAGNOSIS — I10 ESSENTIAL HYPERTENSION: ICD-10-CM

## 2022-09-02 DIAGNOSIS — K76.0 HEPATIC STEATOSIS: ICD-10-CM

## 2022-09-02 DIAGNOSIS — Z72.0 TOBACCO USE: ICD-10-CM

## 2022-09-02 DIAGNOSIS — F51.01 PRIMARY INSOMNIA: ICD-10-CM

## 2022-09-02 DIAGNOSIS — E78.5 HYPERLIPIDEMIA, UNSPECIFIED HYPERLIPIDEMIA TYPE: ICD-10-CM

## 2022-09-02 DIAGNOSIS — Z79.899 POLYPHARMACY: ICD-10-CM

## 2022-09-02 DIAGNOSIS — F41.9 ANXIETY AND DEPRESSION: ICD-10-CM

## 2022-09-02 DIAGNOSIS — F43.10 PTSD (POST-TRAUMATIC STRESS DISORDER): ICD-10-CM

## 2022-09-02 DIAGNOSIS — F32.A ANXIETY AND DEPRESSION: ICD-10-CM

## 2022-09-02 DIAGNOSIS — E11.65 TYPE 2 DIABETES MELLITUS WITH HYPERGLYCEMIA, WITHOUT LONG-TERM CURRENT USE OF INSULIN: Primary | ICD-10-CM

## 2022-09-02 DIAGNOSIS — E11.65 TYPE 2 DIABETES MELLITUS WITH HYPERGLYCEMIA, WITHOUT LONG-TERM CURRENT USE OF INSULIN: ICD-10-CM

## 2022-09-02 DIAGNOSIS — E66.01 MORBID (SEVERE) OBESITY DUE TO EXCESS CALORIES: ICD-10-CM

## 2022-09-02 PROBLEM — A41.9 SEPSIS, UNSPECIFIED ORGANISM (HCC): Status: RESOLVED | Noted: 2021-10-04 | Resolved: 2022-09-02

## 2022-09-02 LAB
ALBUMIN SERPL-MCNC: 5 G/DL (ref 3.5–5.2)
ALBUMIN/GLOB SERPL: 2 G/DL
ALP SERPL-CCNC: 97 U/L (ref 39–117)
ALT SERPL W P-5'-P-CCNC: 38 U/L (ref 1–33)
ANION GAP SERPL CALCULATED.3IONS-SCNC: 13 MMOL/L (ref 5–15)
AST SERPL-CCNC: 27 U/L (ref 1–32)
BASOPHILS # BLD AUTO: 0.01 10*3/MM3 (ref 0–0.2)
BASOPHILS NFR BLD AUTO: 0.1 % (ref 0–1.5)
BILIRUB SERPL-MCNC: 0.2 MG/DL (ref 0–1.2)
BUN SERPL-MCNC: 10 MG/DL (ref 6–20)
BUN/CREAT SERPL: 19.2 (ref 7–25)
CALCIUM SPEC-SCNC: 9.7 MG/DL (ref 8.6–10.5)
CHLORIDE SERPL-SCNC: 102 MMOL/L (ref 98–107)
CHOLEST SERPL-MCNC: 198 MG/DL (ref 0–200)
CO2 SERPL-SCNC: 25 MMOL/L (ref 22–29)
CREAT SERPL-MCNC: 0.52 MG/DL (ref 0.57–1)
DEPRECATED RDW RBC AUTO: 39.5 FL (ref 37–54)
EGFRCR SERPLBLD CKD-EPI 2021: 129.2 ML/MIN/1.73
EOSINOPHIL # BLD AUTO: 0.04 10*3/MM3 (ref 0–0.4)
EOSINOPHIL NFR BLD AUTO: 0.4 % (ref 0.3–6.2)
ERYTHROCYTE [DISTWIDTH] IN BLOOD BY AUTOMATED COUNT: 11.9 % (ref 12.3–15.4)
GLOBULIN UR ELPH-MCNC: 2.5 GM/DL
GLUCOSE SERPL-MCNC: 131 MG/DL (ref 65–99)
HBA1C MFR BLD: 6.7 % (ref 4.8–5.6)
HCT VFR BLD AUTO: 43 % (ref 34–46.6)
HDLC SERPL-MCNC: 46 MG/DL (ref 40–60)
HGB BLD-MCNC: 14.1 G/DL (ref 12–15.9)
IMM GRANULOCYTES # BLD AUTO: 0.04 10*3/MM3 (ref 0–0.05)
IMM GRANULOCYTES NFR BLD AUTO: 0.4 % (ref 0–0.5)
LDLC SERPL CALC-MCNC: 123 MG/DL (ref 0–100)
LDLC/HDLC SERPL: 2.59 {RATIO}
LYMPHOCYTES # BLD AUTO: 2.04 10*3/MM3 (ref 0.7–3.1)
LYMPHOCYTES NFR BLD AUTO: 22.8 % (ref 19.6–45.3)
MCH RBC QN AUTO: 29.8 PG (ref 26.6–33)
MCHC RBC AUTO-ENTMCNC: 32.8 G/DL (ref 31.5–35.7)
MCV RBC AUTO: 90.9 FL (ref 79–97)
MONOCYTES # BLD AUTO: 0.49 10*3/MM3 (ref 0.1–0.9)
MONOCYTES NFR BLD AUTO: 5.5 % (ref 5–12)
NEUTROPHILS NFR BLD AUTO: 6.32 10*3/MM3 (ref 1.7–7)
NEUTROPHILS NFR BLD AUTO: 70.8 % (ref 42.7–76)
NRBC BLD AUTO-RTO: 0 /100 WBC (ref 0–0.2)
PLATELET # BLD AUTO: 295 10*3/MM3 (ref 140–450)
PMV BLD AUTO: 10.2 FL (ref 6–12)
POTASSIUM SERPL-SCNC: 4 MMOL/L (ref 3.5–5.2)
PROT SERPL-MCNC: 7.5 G/DL (ref 6–8.5)
RBC # BLD AUTO: 4.73 10*6/MM3 (ref 3.77–5.28)
SODIUM SERPL-SCNC: 140 MMOL/L (ref 136–145)
TRIGL SERPL-MCNC: 164 MG/DL (ref 0–150)
TSH SERPL DL<=0.05 MIU/L-ACNC: 1.44 UIU/ML (ref 0.27–4.2)
VLDLC SERPL-MCNC: 29 MG/DL (ref 5–40)
WBC NRBC COR # BLD: 8.94 10*3/MM3 (ref 3.4–10.8)

## 2022-09-02 PROCEDURE — 85025 COMPLETE CBC W/AUTO DIFF WBC: CPT | Performed by: INTERNAL MEDICINE

## 2022-09-02 PROCEDURE — 80061 LIPID PANEL: CPT | Performed by: INTERNAL MEDICINE

## 2022-09-02 PROCEDURE — 84681 ASSAY OF C-PEPTIDE: CPT | Performed by: INTERNAL MEDICINE

## 2022-09-02 PROCEDURE — 82043 UR ALBUMIN QUANTITATIVE: CPT | Performed by: INTERNAL MEDICINE

## 2022-09-02 PROCEDURE — 84443 ASSAY THYROID STIM HORMONE: CPT | Performed by: INTERNAL MEDICINE

## 2022-09-02 PROCEDURE — 99204 OFFICE O/P NEW MOD 45 MIN: CPT | Performed by: INTERNAL MEDICINE

## 2022-09-02 PROCEDURE — 82607 VITAMIN B-12: CPT | Performed by: INTERNAL MEDICINE

## 2022-09-02 PROCEDURE — 80053 COMPREHEN METABOLIC PANEL: CPT | Performed by: INTERNAL MEDICINE

## 2022-09-02 PROCEDURE — 83036 HEMOGLOBIN GLYCOSYLATED A1C: CPT | Performed by: INTERNAL MEDICINE

## 2022-09-02 PROCEDURE — 82570 ASSAY OF URINE CREATININE: CPT | Performed by: INTERNAL MEDICINE

## 2022-09-02 PROCEDURE — 36415 COLL VENOUS BLD VENIPUNCTURE: CPT | Performed by: INTERNAL MEDICINE

## 2022-09-02 RX ORDER — CLONIDINE HYDROCHLORIDE 0.1 MG/1
0.1 TABLET ORAL DAILY
Qty: 30 TABLET | Refills: 5 | Status: SHIPPED | OUTPATIENT
Start: 2022-09-02 | End: 2023-03-06

## 2022-09-02 RX ORDER — LISINOPRIL 5 MG/1
5 TABLET ORAL DAILY
Qty: 30 TABLET | Refills: 2 | Status: SHIPPED | OUTPATIENT
Start: 2022-09-02 | End: 2022-11-07

## 2022-09-02 RX ORDER — LISDEXAMFETAMINE DIMESYLATE 60 MG/1
60 CAPSULE ORAL DAILY
COMMUNITY
Start: 2022-08-11

## 2022-09-02 NOTE — PROGRESS NOTES
"Chief Complaint   Patient presents with   • Establish Care   • Referrals     Neurology, ENT   • Insomnia   • Med Refill   • Diabetes         History:  Emili Caal is a 31 y.o. female who presents today for evaluation of the above problems.      She presents today to establish care.  She has a past medical history for type 2 diabetes, hypertension, hyperlipidemia, anxiety, depression, PTSD, ADHD among others.    She is maintained on Trulicity 0.75 mg weekly, Levemir 25 units every morning, NovoLog 6 units 3 times a day before meals for her diabetes.    She is also on Valium 10 mg twice a day as needed for anxiety, Lexapro 10 mg daily, tramadol 50 mg every 6 hours as needed, Vyvanse 60 mg daily, and gabapentin 600 mg twice a day.     She has been prescribed Vyvanse, gabapentin, and Valium by [facility] in [place].    She is also followed by Dr. Jb Holliday.  Her last blood work was October 11, 2021 when she has severe episode of diabetic ketoacidosis.  A1c on October 4, 2021 was 13.1%    She has had a tympanostomy tube in her left ear since 2015 and feels like it might need to come out.  She reports having fluid on her brain based on MRI [facility].  I do not have any of those records though.    She recently moved from [place] to [place] to live with her aunt.    She stopped taking her antihyperglycemic medications including insulin and Trulicity because she was on \"way too much insulin\".  She does report her most recent A1c of 6.7% although I do not have any records of this.    She takes clonidine both for her sleep and for her blood pressure.  She is out of the medication and has had difficulty sleeping.    She is not on an ACE inhibitor or ARB     She has IBS and chronic abdominal pain as such.    She is eating \"extra\" and has gained much of her weight back.  She has lost 60 pounds to get to 220 pounds but now she weighs 259.  She is not currently exercising " either.    HPI      ROS:  Review of Systems   HENT: Negative.    Cardiovascular: Negative.    Gastrointestinal: Positive for abdominal pain.   Psychiatric/Behavioral: Positive for confusion, decreased concentration and sleep disturbance.         Current Outpatient Medications:   •  Blood Glucose Monitoring Suppl (ONE TOUCH ULTRA 2) w/Device kit, Use to test blood sugar before meals and at bedtime., Disp: 1 each, Rfl: 0  •  cloNIDine (CATAPRES) 0.1 MG tablet, Take 1 tablet by mouth Daily., Disp: 30 tablet, Rfl: 5  •  diazePAM (VALIUM) 10 MG tablet, Take 10 mg by mouth 2 (Two) Times a Day As Needed for Anxiety., Disp: , Rfl:   •  Dulaglutide 0.75 MG/0.5ML solution pen-injector, Inject 0.75 mg under the skin into the appropriate area as directed 1 (One) Time Per Week., Disp: 4 pen, Rfl: 11  •  escitalopram (LEXAPRO) 10 MG tablet, Take 10 mg by mouth Daily., Disp: , Rfl:   •  gabapentin (NEURONTIN) 600 MG tablet, Take 600 mg by mouth 2 (Two) Times a Day., Disp: , Rfl:   •  glucose blood (OneTouch Ultra) test strip, Use to test blood sugar before meals and at bedtime., Disp: 100 each, Rfl: 2  •  Lancets (OneTouch Delica Plus [id number]) misc, Use to test blood sugar before meals and at bedtime., Disp: 100 each, Rfl: 2  •  simvastatin (ZOCOR) 10 MG tablet, Take 10 mg by mouth Every Night., Disp: , Rfl:   •  traMADol (ULTRAM) 50 MG tablet, Take 50 mg by mouth Every 6 (Six) Hours As Needed for Moderate Pain ., Disp: , Rfl:   •  Vyvanse 60 MG capsule, Take 60 mg by mouth Daily, Disp: , Rfl:   •  insulin aspart (novoLOG FLEXPEN) 100 UNIT/ML solution pen-injector sc pen, Inject 6 Units under the skin into the appropriate area as directed 3 (Three) Times a Day With Meals., Disp: 60 mL, Rfl: 2  •  insulin detemir (Levemir FlexTouch) 100 UNIT/ML injection, Inject 25 Units under the skin into the appropriate area as directed Every Morning., Disp: 60 mL, Rfl: 1  •  lisinopril (PRINIVIL,ZESTRIL) 5 MG tablet, Take 1 tablet by mouth  Daily., Disp: 30 tablet, Rfl: 2    Lab Results   Component Value Date    GLUCOSE 172 (H) 10/11/2021    BUN 7 10/11/2021    CREATININE 0.45 (L) 10/11/2021    EGFRIFNONA >150 10/11/2021    BCR 15.6 10/11/2021    K 3.5 10/11/2021    CO2 21.0 (L) 10/11/2021    CALCIUM 9.2 10/11/2021    ALBUMIN 4.80 10/04/2021    AST 22 10/04/2021    ALT 20 10/04/2021       WBC   Date Value Ref Range Status   09/02/2022 8.94 3.40 - 10.80 10*3/mm3 Final     RBC   Date Value Ref Range Status   09/02/2022 4.73 3.77 - 5.28 10*6/mm3 Final     Hemoglobin   Date Value Ref Range Status   09/02/2022 14.1 12.0 - 15.9 g/dL Final     Hematocrit   Date Value Ref Range Status   09/02/2022 43.0 34.0 - 46.6 % Final     MCV   Date Value Ref Range Status   09/02/2022 90.9 79.0 - 97.0 fL Final     MCH   Date Value Ref Range Status   09/02/2022 29.8 26.6 - 33.0 pg Final     MCHC   Date Value Ref Range Status   09/02/2022 32.8 31.5 - 35.7 g/dL Final     RDW   Date Value Ref Range Status   09/02/2022 11.9 (L) 12.3 - 15.4 % Final     RDW-SD   Date Value Ref Range Status   09/02/2022 39.5 37.0 - 54.0 fl Final     MPV   Date Value Ref Range Status   09/02/2022 10.2 6.0 - 12.0 fL Final     Platelets   Date Value Ref Range Status   09/02/2022 295 140 - 450 10*3/mm3 Final     Neutrophil %   Date Value Ref Range Status   09/02/2022 70.8 42.7 - 76.0 % Final     Lymphocyte %   Date Value Ref Range Status   09/02/2022 22.8 19.6 - 45.3 % Final     Monocyte %   Date Value Ref Range Status   09/02/2022 5.5 5.0 - 12.0 % Final     Eosinophil %   Date Value Ref Range Status   09/02/2022 0.4 0.3 - 6.2 % Final     Basophil %   Date Value Ref Range Status   09/02/2022 0.1 0.0 - 1.5 % Final     Immature Grans %   Date Value Ref Range Status   09/02/2022 0.4 0.0 - 0.5 % Final     Neutrophils, Absolute   Date Value Ref Range Status   09/02/2022 6.32 1.70 - 7.00 10*3/mm3 Final     Lymphocytes, Absolute   Date Value Ref Range Status   09/02/2022 2.04 0.70 - 3.10 10*3/mm3 Final  "    Monocytes, Absolute   Date Value Ref Range Status   09/02/2022 0.49 0.10 - 0.90 10*3/mm3 Final     Eosinophils, Absolute   Date Value Ref Range Status   09/02/2022 0.04 0.00 - 0.40 10*3/mm3 Final     Basophils, Absolute   Date Value Ref Range Status   09/02/2022 0.01 0.00 - 0.20 10*3/mm3 Final     Immature Grans, Absolute   Date Value Ref Range Status   09/02/2022 0.04 0.00 - 0.05 10*3/mm3 Final     nRBC   Date Value Ref Range Status   09/02/2022 0.0 0.0 - 0.2 /100 WBC Final         OBJECTIVE:  Visit Vitals  /80 (BP Location: Left arm, Patient Position: Sitting, Cuff Size: Adult)   Pulse 84   Temp 98.4 °F (36.9 °C) (Oral)   Resp 15   Ht 167.6 cm (65.98\")   Wt 118 kg (259 lb 1.6 oz)   SpO2 98%   BMI 41.84 kg/m²      Physical Exam  Vitals and nursing note reviewed.   Constitutional:       General: She is not in acute distress.     Appearance: She is well-developed. She is not diaphoretic.      Comments: Accompanied by her aunt   HENT:      Head: Normocephalic and atraumatic.      Right Ear: Tympanic membrane, ear canal and external ear normal. There is no impacted cerumen. No PE tube.      Left Ear: Tympanic membrane normal. A PE tube is present.   Eyes:      Pupils: Pupils are equal, round, and reactive to light.   Neck:      Thyroid: No thyromegaly.      Trachea: Phonation normal.   Cardiovascular:      Rate and Rhythm: Normal rate and regular rhythm.      Heart sounds: No murmur heard.  Pulmonary:      Effort: Pulmonary effort is normal. No respiratory distress.      Breath sounds: Normal breath sounds. No wheezing or rales.   Abdominal:      Tenderness: There is abdominal tenderness (Mild).       Musculoskeletal:      Left lower leg: No edema.   Skin:     Coloration: Skin is not pale.      Findings: No erythema.   Neurological:      Mental Status: She is alert.   Psychiatric:         Attention and Perception: Attention and perception normal.         Mood and Affect: Mood normal.         Behavior: " Behavior is slowed (Speech and behavior is slightly slowed).         Thought Content: Thought content normal.         Judgment: Judgment normal.         Assessment/Plan    Diagnoses and all orders for this visit:    1. Type 2 diabetes mellitus with hyperglycemia, without long-term current use of insulin (HCC) (Primary)  -     CBC (No Diff); Future  -     Comprehensive Metabolic Panel; Future  -     Lipid Panel; Future  -     Hemoglobin A1c; Future  -     Microalbumin / Creatinine Urine Ratio - Urine, Clean Catch; Future  -     lisinopril (PRINIVIL,ZESTRIL) 5 MG tablet; Take 1 tablet by mouth Daily.  Dispense: 30 tablet; Refill: 2    2. Essential hypertension  -     CBC (No Diff); Future  -     Comprehensive Metabolic Panel; Future  -     lisinopril (PRINIVIL,ZESTRIL) 5 MG tablet; Take 1 tablet by mouth Daily.  Dispense: 30 tablet; Refill: 2    3. Hyperlipidemia, unspecified hyperlipidemia type    4. Anxiety and depression    5. PTSD (post-traumatic stress disorder)    6. Polypharmacy    7. Hepatic steatosis  -     CBC (No Diff); Future  -     Comprehensive Metabolic Panel; Future    8. Primary insomnia  -     cloNIDine (CATAPRES) 0.1 MG tablet; Take 1 tablet by mouth Daily.  Dispense: 30 tablet; Refill: 5    9. Tobacco use    10. Morbid (severe) obesity due to excess calories (HCC)      We have to obtain some baseline labs.  She last had blood work October 2021 when she had severe episode of diabetic ketoacidosis.  It appears that episode may have affected her cognitive capacity.  She had MRI of her brain and we will try to get records from [facility].  We will also try to get records from her psychiatrist.  She has polypharmacy and I would recommend discontinuation of many of them.  She is on Lexapro, Vyvanse, Valium, gabapentin, and tramadol.    I do not think referral to ENT is warranted at this point given a normal-appearing left tympanostomy tube.    Refer to neurology once we get more information    Refill  clonidine for her insomnia    Start low-dose ACE inhibitor for hypertension and for her diabetes.    She may need to restart medication for diabetes depending on her A1c.    Follow-up in 3 months recheck A1c, or follow-up sooner if indicated.  Return in about 3 months (around 12/2/2022) for Recheck A1c.      KIM Davis MD  13:30 CDT  9/2/2022

## 2022-09-03 LAB
ALBUMIN UR-MCNC: <1.2 MG/DL
C PEPTIDE SERPL-MCNC: 4.1 NG/ML (ref 1.1–4.4)
CREAT UR-MCNC: 74.8 MG/DL
MICROALBUMIN/CREAT UR: NORMAL MG/G{CREAT}
VIT B12 BLD-MCNC: 290 PG/ML (ref 211–946)

## 2022-09-19 ENCOUNTER — TELEPHONE (OUTPATIENT)
Dept: INTERNAL MEDICINE | Facility: CLINIC | Age: 32
End: 2022-09-19

## 2022-09-19 NOTE — TELEPHONE ENCOUNTER
Caller: Emili Caal    Relationship to patient: Self    Best call back number: [phone]    Patient is needing: RECORDS REQUESTS TO BE SENT OUT TO DIFFERENT OFFICES SO THAT RECORDS COULD BE GIVEN TO THE OFFICE. PATIENT WILL CALLBACK WITH FAX NUMBERS.

## 2022-10-19 ENCOUNTER — HOSPITAL ENCOUNTER (EMERGENCY)
Facility: HOSPITAL | Age: 32
Discharge: PSYCHIATRIC OR SUBSTANCE USE FACILITY | End: 2022-10-20
Attending: STUDENT IN AN ORGANIZED HEALTH CARE EDUCATION/TRAINING PROGRAM | Admitting: STUDENT IN AN ORGANIZED HEALTH CARE EDUCATION/TRAINING PROGRAM

## 2022-10-19 DIAGNOSIS — R45.851 SUICIDAL THOUGHTS: Primary | ICD-10-CM

## 2022-10-19 LAB
ALBUMIN SERPL-MCNC: 4.3 G/DL (ref 3.5–5.2)
ALBUMIN/GLOB SERPL: 1.5 G/DL
ALP SERPL-CCNC: 94 U/L (ref 39–117)
ALT SERPL W P-5'-P-CCNC: 22 U/L (ref 1–33)
AMPHET+METHAMPHET UR QL: POSITIVE
AMPHETAMINES UR QL: POSITIVE
ANION GAP SERPL CALCULATED.3IONS-SCNC: 12 MMOL/L (ref 5–15)
APAP SERPL-MCNC: <5 MCG/ML (ref 0–30)
AST SERPL-CCNC: 20 U/L (ref 1–32)
BARBITURATES UR QL SCN: NEGATIVE
BASOPHILS # BLD AUTO: 0.01 10*3/MM3 (ref 0–0.2)
BASOPHILS NFR BLD AUTO: 0.1 % (ref 0–1.5)
BENZODIAZ UR QL SCN: POSITIVE
BILIRUB SERPL-MCNC: 0.3 MG/DL (ref 0–1.2)
BUN SERPL-MCNC: 9 MG/DL (ref 6–20)
BUN/CREAT SERPL: 15.5 (ref 7–25)
BUPRENORPHINE SERPL-MCNC: NEGATIVE NG/ML
CALCIUM SPEC-SCNC: 9.6 MG/DL (ref 8.6–10.5)
CANNABINOIDS SERPL QL: POSITIVE
CHLORIDE SERPL-SCNC: 107 MMOL/L (ref 98–107)
CO2 SERPL-SCNC: 23 MMOL/L (ref 22–29)
COCAINE UR QL: NEGATIVE
CREAT SERPL-MCNC: 0.58 MG/DL (ref 0.57–1)
DEPRECATED RDW RBC AUTO: 38.5 FL (ref 37–54)
EGFRCR SERPLBLD CKD-EPI 2021: 125.8 ML/MIN/1.73
EOSINOPHIL # BLD AUTO: 0.04 10*3/MM3 (ref 0–0.4)
EOSINOPHIL NFR BLD AUTO: 0.4 % (ref 0.3–6.2)
ERYTHROCYTE [DISTWIDTH] IN BLOOD BY AUTOMATED COUNT: 11.9 % (ref 12.3–15.4)
ETHANOL BLD-MCNC: <10 MG/DL (ref 0–10)
ETHANOL UR QL: <0.01 %
FLUAV RNA RESP QL NAA+PROBE: NOT DETECTED
FLUBV RNA RESP QL NAA+PROBE: NOT DETECTED
GLOBULIN UR ELPH-MCNC: 2.9 GM/DL
GLUCOSE SERPL-MCNC: 152 MG/DL (ref 65–99)
HCT VFR BLD AUTO: 41.6 % (ref 34–46.6)
HGB BLD-MCNC: 14 G/DL (ref 12–15.9)
HOLD SPECIMEN: NORMAL
HOLD SPECIMEN: NORMAL
IMM GRANULOCYTES # BLD AUTO: 0.02 10*3/MM3 (ref 0–0.05)
IMM GRANULOCYTES NFR BLD AUTO: 0.2 % (ref 0–0.5)
LYMPHOCYTES # BLD AUTO: 2.12 10*3/MM3 (ref 0.7–3.1)
LYMPHOCYTES NFR BLD AUTO: 20.7 % (ref 19.6–45.3)
MCH RBC QN AUTO: 29.7 PG (ref 26.6–33)
MCHC RBC AUTO-ENTMCNC: 33.7 G/DL (ref 31.5–35.7)
MCV RBC AUTO: 88.1 FL (ref 79–97)
METHADONE UR QL SCN: NEGATIVE
MONOCYTES # BLD AUTO: 0.59 10*3/MM3 (ref 0.1–0.9)
MONOCYTES NFR BLD AUTO: 5.8 % (ref 5–12)
NEUTROPHILS NFR BLD AUTO: 7.47 10*3/MM3 (ref 1.7–7)
NEUTROPHILS NFR BLD AUTO: 72.8 % (ref 42.7–76)
NRBC BLD AUTO-RTO: 0 /100 WBC (ref 0–0.2)
OPIATES UR QL: NEGATIVE
OXYCODONE UR QL SCN: NEGATIVE
PCP UR QL SCN: NEGATIVE
PLATELET # BLD AUTO: 227 10*3/MM3 (ref 140–450)
PMV BLD AUTO: 10.7 FL (ref 6–12)
POTASSIUM SERPL-SCNC: 4 MMOL/L (ref 3.5–5.2)
PROPOXYPH UR QL: NEGATIVE
PROT SERPL-MCNC: 7.2 G/DL (ref 6–8.5)
RBC # BLD AUTO: 4.72 10*6/MM3 (ref 3.77–5.28)
SALICYLATES SERPL-MCNC: <0.3 MG/DL
SARS-COV-2 RNA RESP QL NAA+PROBE: NOT DETECTED
SODIUM SERPL-SCNC: 142 MMOL/L (ref 136–145)
TRICYCLICS UR QL SCN: NEGATIVE
WBC NRBC COR # BLD: 10.25 10*3/MM3 (ref 3.4–10.8)
WHOLE BLOOD HOLD SPECIMEN: NORMAL

## 2022-10-19 PROCEDURE — 99285 EMERGENCY DEPT VISIT HI MDM: CPT

## 2022-10-19 PROCEDURE — 82077 ASSAY SPEC XCP UR&BREATH IA: CPT

## 2022-10-19 PROCEDURE — 80306 DRUG TEST PRSMV INSTRMNT: CPT

## 2022-10-19 PROCEDURE — 36415 COLL VENOUS BLD VENIPUNCTURE: CPT

## 2022-10-19 PROCEDURE — C9803 HOPD COVID-19 SPEC COLLECT: HCPCS

## 2022-10-19 PROCEDURE — 80143 DRUG ASSAY ACETAMINOPHEN: CPT

## 2022-10-19 PROCEDURE — 85025 COMPLETE CBC W/AUTO DIFF WBC: CPT

## 2022-10-19 PROCEDURE — 87636 SARSCOV2 & INF A&B AMP PRB: CPT

## 2022-10-19 PROCEDURE — 80053 COMPREHEN METABOLIC PANEL: CPT

## 2022-10-19 PROCEDURE — 80179 DRUG ASSAY SALICYLATE: CPT

## 2022-10-19 RX ORDER — SODIUM CHLORIDE 0.9 % (FLUSH) 0.9 %
10 SYRINGE (ML) INJECTION AS NEEDED
Status: DISCONTINUED | OUTPATIENT
Start: 2022-10-19 | End: 2022-10-20 | Stop reason: HOSPADM

## 2022-10-19 NOTE — ED PROVIDER NOTES
Subjective   History of Present Illness  31-year-old female comes to the ER chief complaint of having thoughts of wanting to harm her self.  Patient reports being depressed.  This morning she had thoughts of wanting to take all of her pills and go to sleep and not wake up again.  She reports overdosing in the past several years ago.    History provided by:  Patient  History limited by:  Psychiatric disorder   used: No        Review of Systems   Unable to perform ROS: Psychiatric disorder       Past Medical History:   Diagnosis Date   • Allergic    • Anxiety    • Asthma    • Chronic diarrhea    • Depression    • Diabetes mellitus (HCC)    • High blood pressure    • Hyperlipidemia    • Liver disease    • PTSD (post-traumatic stress disorder)    • Sepsis, unspecified organism (HCC) 10/4/2021       Allergies   Allergen Reactions   • Hydroxyquinolines Other (See Comments)     Passed out and was dizzy   • Hydroxyzine Other (See Comments)     Passed out       Past Surgical History:   Procedure Laterality Date   • ADENOIDECTOMY     • EAR TUBES  2015       Family History   Problem Relation Age of Onset   • Liver disease Mother    • Alcohol abuse Mother    • Hypertension Mother    • Dementia Mother    • Liver disease Father    • Alcohol abuse Father    • Hypertension Father    • Diabetes Sister    • Alcohol abuse Brother    • Cancer Paternal Grandmother    • Diabetes Paternal Grandmother    • Hypertension Paternal Grandmother        Social History     Socioeconomic History   • Marital status: Single   Tobacco Use   • Smoking status: Every Day     Packs/day: 0.50     Types: Cigarettes   • Smokeless tobacco: Never   Vaping Use   • Vaping Use: Former   Substance and Sexual Activity   • Alcohol use: Never   • Drug use: Not Currently   • Sexual activity: Not Currently           Objective    Vitals:    10/19/22 1501 10/19/22 1534 10/19/22 1643 10/19/22 1925   BP: 133/69 124/68 119/63 114/59   BP Location: Right  arm  Right arm Right arm   Patient Position: Sitting  Lying Sitting   Pulse: 74 77 79 79   Resp: 20 20 20 16   Temp:       TempSrc:       SpO2: 97% 98% 96% 99%   Weight:       Height:           Physical Exam  Vitals and nursing note reviewed.   Constitutional:       General: She is not in acute distress.     Appearance: She is well-developed. She is obese. She is not ill-appearing or diaphoretic.   HENT:      Head: Normocephalic.      Right Ear: External ear normal.      Left Ear: External ear normal.   Eyes:      Conjunctiva/sclera: Conjunctivae normal.   Pulmonary:      Effort: Pulmonary effort is normal. No accessory muscle usage or respiratory distress.   Skin:     General: Skin is warm and dry.   Neurological:      Mental Status: She is oriented to person, place, and time.   Psychiatric:         Mood and Affect: Mood is depressed. Affect is tearful.         Behavior: Behavior normal.         Thought Content: Thought content includes suicidal ideation. Thought content does not include homicidal ideation. Thought content includes suicidal plan. Thought content does not include homicidal plan.         Procedures           ED Course      Results for orders placed or performed during the hospital encounter of 10/19/22   COVID-19 and FLU A/B PCR - Swab, Nasopharynx    Specimen: Nasopharynx; Swab   Result Value Ref Range    COVID19 Not Detected Not Detected - Ref. Range    Influenza A PCR Not Detected Not Detected    Influenza B PCR Not Detected Not Detected   Comprehensive Metabolic Panel    Specimen: Blood   Result Value Ref Range    Glucose 152 (H) 65 - 99 mg/dL    BUN 9 6 - 20 mg/dL    Creatinine 0.58 0.57 - 1.00 mg/dL    Sodium 142 136 - 145 mmol/L    Potassium 4.0 3.5 - 5.2 mmol/L    Chloride 107 98 - 107 mmol/L    CO2 23.0 22.0 - 29.0 mmol/L    Calcium 9.6 8.6 - 10.5 mg/dL    Total Protein 7.2 6.0 - 8.5 g/dL    Albumin 4.30 3.50 - 5.20 g/dL    ALT (SGPT) 22 1 - 33 U/L    AST (SGOT) 20 1 - 32 U/L    Alkaline  Phosphatase 94 39 - 117 U/L    Total Bilirubin 0.3 0.0 - 1.2 mg/dL    Globulin 2.9 gm/dL    A/G Ratio 1.5 g/dL    BUN/Creatinine Ratio 15.5 7.0 - 25.0    Anion Gap 12.0 5.0 - 15.0 mmol/L    eGFR 125.8 >60.0 mL/min/1.73   Acetaminophen Level    Specimen: Blood   Result Value Ref Range    Acetaminophen <5.0 0.0 - 30.0 mcg/mL   Ethanol    Specimen: Blood   Result Value Ref Range    Ethanol <10 0 - 10 mg/dL    Ethanol % <0.010 %   Salicylate Level    Specimen: Blood   Result Value Ref Range    Salicylate <0.3 <=30.0 mg/dL   Urine Drug Screen - Urine, Clean Catch    Specimen: Urine, Clean Catch   Result Value Ref Range    THC, Screen, Urine Positive (A) Negative    Phencyclidine (PCP), Urine Negative Negative    Cocaine Screen, Urine Negative Negative    Methamphetamine, Ur Positive (A) Negative    Opiate Screen Negative Negative    Amphetamine Screen, Urine Positive (A) Negative    Benzodiazepine Screen, Urine Positive (A) Negative    Tricyclic Antidepressants Screen Negative Negative    Methadone Screen, Urine Negative Negative    Barbiturates Screen, Urine Negative Negative    Oxycodone Screen, Urine Negative Negative    Propoxyphene Screen Negative Negative    Buprenorphine, Screen, Urine Negative Negative   CBC Auto Differential    Specimen: Blood   Result Value Ref Range    WBC 10.25 3.40 - 10.80 10*3/mm3    RBC 4.72 3.77 - 5.28 10*6/mm3    Hemoglobin 14.0 12.0 - 15.9 g/dL    Hematocrit 41.6 34.0 - 46.6 %    MCV 88.1 79.0 - 97.0 fL    MCH 29.7 26.6 - 33.0 pg    MCHC 33.7 31.5 - 35.7 g/dL    RDW 11.9 (L) 12.3 - 15.4 %    RDW-SD 38.5 37.0 - 54.0 fl    MPV 10.7 6.0 - 12.0 fL    Platelets 227 140 - 450 10*3/mm3    Neutrophil % 72.8 42.7 - 76.0 %    Lymphocyte % 20.7 19.6 - 45.3 %    Monocyte % 5.8 5.0 - 12.0 %    Eosinophil % 0.4 0.3 - 6.2 %    Basophil % 0.1 0.0 - 1.5 %    Immature Grans % 0.2 0.0 - 0.5 %    Neutrophils, Absolute 7.47 (H) 1.70 - 7.00 10*3/mm3    Lymphocytes, Absolute 2.12 0.70 - 3.10 10*3/mm3     Monocytes, Absolute 0.59 0.10 - 0.90 10*3/mm3    Eosinophils, Absolute 0.04 0.00 - 0.40 10*3/mm3    Basophils, Absolute 0.01 0.00 - 0.20 10*3/mm3    Immature Grans, Absolute 0.02 0.00 - 0.05 10*3/mm3    nRBC 0.0 0.0 - 0.2 /100 WBC   Green Top (Gel)   Result Value Ref Range    Extra Tube Hold for add-ons.    Lavender Top   Result Value Ref Range    Extra Tube hold for add-on    Gold Top - SST   Result Value Ref Range    Extra Tube Hold for add-ons.                                           MDM  Number of Diagnoses or Management Options  Suicidal thoughts: new and requires workup  Diagnosis management comments: Patient medically cleared.  UDS is positive for THC, methamphetamines, amphetamines.  Deonte evaluated the patient and recommend that she be admitted to [facility].  Patient is agreeable.  She will be transported by ambulance      Final diagnoses:   Suicidal thoughts       ED Disposition  ED Disposition     ED Disposition   Transfer to Another Facility     Condition   --    Comment   --             No follow-up provider specified.       Medication List      No changes were made to your prescriptions during this visit.          Jay Amaral MD  10/19/22 [id number]

## 2022-10-20 VITALS
OXYGEN SATURATION: 97 % | WEIGHT: 254 LBS | HEIGHT: 66 IN | SYSTOLIC BLOOD PRESSURE: 96 MMHG | RESPIRATION RATE: 18 BRPM | HEART RATE: 69 BPM | BODY MASS INDEX: 40.82 KG/M2 | DIASTOLIC BLOOD PRESSURE: 64 MMHG | TEMPERATURE: 98 F

## 2022-11-07 DIAGNOSIS — I10 ESSENTIAL HYPERTENSION: ICD-10-CM

## 2022-11-07 DIAGNOSIS — E11.65 TYPE 2 DIABETES MELLITUS WITH HYPERGLYCEMIA, WITHOUT LONG-TERM CURRENT USE OF INSULIN: ICD-10-CM

## 2022-11-07 RX ORDER — LISINOPRIL 5 MG/1
5 TABLET ORAL DAILY
Qty: 30 TABLET | Refills: 2 | Status: SHIPPED | OUTPATIENT
Start: 2022-11-07 | End: 2023-03-07

## 2022-11-07 NOTE — TELEPHONE ENCOUNTER
I am sending in some refills, but she will need to reschedule the appointment she recently canceled.

## 2023-02-08 ENCOUNTER — HOSPITAL ENCOUNTER (EMERGENCY)
Facility: HOSPITAL | Age: 33
Discharge: HOME | End: 2023-02-08
Attending: FAMILY MEDICINE | Admitting: FAMILY MEDICINE
Payer: COMMERCIAL

## 2023-02-08 ENCOUNTER — APPOINTMENT (OUTPATIENT)
Dept: CT IMAGING | Facility: HOSPITAL | Age: 33
End: 2023-02-08
Payer: COMMERCIAL

## 2023-02-08 VITALS
HEART RATE: 80 BPM | SYSTOLIC BLOOD PRESSURE: 120 MMHG | DIASTOLIC BLOOD PRESSURE: 68 MMHG | RESPIRATION RATE: 18 BRPM | WEIGHT: 260 LBS | HEIGHT: 66 IN | BODY MASS INDEX: 41.78 KG/M2 | TEMPERATURE: 97.9 F | OXYGEN SATURATION: 100 %

## 2023-02-08 DIAGNOSIS — M51.36 BULGING LUMBAR DISC: ICD-10-CM

## 2023-02-08 DIAGNOSIS — M54.50 LUMBAR PAIN WITH RADIATION DOWN RIGHT LEG: Primary | ICD-10-CM

## 2023-02-08 DIAGNOSIS — M79.604 LUMBAR PAIN WITH RADIATION DOWN RIGHT LEG: Primary | ICD-10-CM

## 2023-02-08 LAB
ALBUMIN SERPL-MCNC: 4.2 G/DL (ref 3.5–5.2)
ALBUMIN/GLOB SERPL: 1.8 G/DL
ALP SERPL-CCNC: 77 U/L (ref 39–117)
ALT SERPL W P-5'-P-CCNC: 28 U/L (ref 1–33)
ANION GAP SERPL CALCULATED.3IONS-SCNC: 12 MMOL/L (ref 5–15)
AST SERPL-CCNC: 20 U/L (ref 1–32)
B-HCG UR QL: NEGATIVE
BASOPHILS # BLD AUTO: 0.02 10*3/MM3 (ref 0–0.2)
BASOPHILS NFR BLD AUTO: 0.2 % (ref 0–1.5)
BILIRUB SERPL-MCNC: <0.2 MG/DL (ref 0–1.2)
BILIRUB UR QL STRIP: NEGATIVE
BUN SERPL-MCNC: 8 MG/DL (ref 6–20)
BUN/CREAT SERPL: 13.8 (ref 7–25)
CALCIUM SPEC-SCNC: 9.2 MG/DL (ref 8.6–10.5)
CHLORIDE SERPL-SCNC: 105 MMOL/L (ref 98–107)
CLARITY UR: CLEAR
CO2 SERPL-SCNC: 22 MMOL/L (ref 22–29)
COLOR UR: YELLOW
CREAT SERPL-MCNC: 0.58 MG/DL (ref 0.57–1)
DEPRECATED RDW RBC AUTO: 42.1 FL (ref 37–54)
EGFRCR SERPLBLD CKD-EPI 2021: 125 ML/MIN/1.73
EOSINOPHIL # BLD AUTO: 0.06 10*3/MM3 (ref 0–0.4)
EOSINOPHIL NFR BLD AUTO: 0.5 % (ref 0.3–6.2)
ERYTHROCYTE [DISTWIDTH] IN BLOOD BY AUTOMATED COUNT: 12.9 % (ref 12.3–15.4)
EXPIRATION DATE: NORMAL
GLOBULIN UR ELPH-MCNC: 2.4 GM/DL
GLUCOSE SERPL-MCNC: 189 MG/DL (ref 65–99)
GLUCOSE UR STRIP-MCNC: NEGATIVE MG/DL
HCT VFR BLD AUTO: 41.9 % (ref 34–46.6)
HGB BLD-MCNC: 13.6 G/DL (ref 12–15.9)
HGB UR QL STRIP.AUTO: NEGATIVE
IMM GRANULOCYTES # BLD AUTO: 0.04 10*3/MM3 (ref 0–0.05)
IMM GRANULOCYTES NFR BLD AUTO: 0.3 % (ref 0–0.5)
INTERNAL NEGATIVE CONTROL: NEGATIVE
INTERNAL POSITIVE CONTROL: POSITIVE
KETONES UR QL STRIP: ABNORMAL
LEUKOCYTE ESTERASE UR QL STRIP.AUTO: NEGATIVE
LYMPHOCYTES # BLD AUTO: 2.67 10*3/MM3 (ref 0.7–3.1)
LYMPHOCYTES NFR BLD AUTO: 21.3 % (ref 19.6–45.3)
Lab: NORMAL
MCH RBC QN AUTO: 29.4 PG (ref 26.6–33)
MCHC RBC AUTO-ENTMCNC: 32.5 G/DL (ref 31.5–35.7)
MCV RBC AUTO: 90.5 FL (ref 79–97)
MONOCYTES # BLD AUTO: 0.64 10*3/MM3 (ref 0.1–0.9)
MONOCYTES NFR BLD AUTO: 5.1 % (ref 5–12)
NEUTROPHILS NFR BLD AUTO: 72.6 % (ref 42.7–76)
NEUTROPHILS NFR BLD AUTO: 9.11 10*3/MM3 (ref 1.7–7)
NITRITE UR QL STRIP: NEGATIVE
NRBC BLD AUTO-RTO: 0 /100 WBC (ref 0–0.2)
PH UR STRIP.AUTO: 5.5 [PH] (ref 5–8)
PLATELET # BLD AUTO: 246 10*3/MM3 (ref 140–450)
PMV BLD AUTO: 10.7 FL (ref 6–12)
POTASSIUM SERPL-SCNC: 4.2 MMOL/L (ref 3.5–5.2)
PROT SERPL-MCNC: 6.6 G/DL (ref 6–8.5)
PROT UR QL STRIP: NEGATIVE
RBC # BLD AUTO: 4.63 10*6/MM3 (ref 3.77–5.28)
SODIUM SERPL-SCNC: 139 MMOL/L (ref 136–145)
SP GR UR STRIP: >=1.03 (ref 1–1.03)
UROBILINOGEN UR QL STRIP: ABNORMAL
WBC NRBC COR # BLD: 12.54 10*3/MM3 (ref 3.4–10.8)

## 2023-02-08 PROCEDURE — 81003 URINALYSIS AUTO W/O SCOPE: CPT | Performed by: FAMILY MEDICINE

## 2023-02-08 PROCEDURE — 99283 EMERGENCY DEPT VISIT LOW MDM: CPT

## 2023-02-08 PROCEDURE — 36415 COLL VENOUS BLD VENIPUNCTURE: CPT

## 2023-02-08 PROCEDURE — 85025 COMPLETE CBC W/AUTO DIFF WBC: CPT | Performed by: FAMILY MEDICINE

## 2023-02-08 PROCEDURE — 80053 COMPREHEN METABOLIC PANEL: CPT | Performed by: FAMILY MEDICINE

## 2023-02-08 PROCEDURE — 72131 CT LUMBAR SPINE W/O DYE: CPT

## 2023-02-08 PROCEDURE — 81025 URINE PREGNANCY TEST: CPT | Performed by: FAMILY MEDICINE

## 2023-02-08 RX ORDER — HYDROCODONE BITARTRATE AND ACETAMINOPHEN 5; 325 MG/1; MG/1
1 TABLET ORAL EVERY 6 HOURS PRN
Qty: 10 TABLET | Refills: 0 | Status: SHIPPED | OUTPATIENT
Start: 2023-02-08 | End: 2023-02-11

## 2023-02-08 RX ORDER — HYDROCODONE BITARTRATE AND ACETAMINOPHEN 7.5; 325 MG/1; MG/1
1 TABLET ORAL ONCE
Status: COMPLETED | OUTPATIENT
Start: 2023-02-08 | End: 2023-02-08

## 2023-02-08 RX ORDER — NAPROXEN 500 MG/1
500 TABLET ORAL 2 TIMES DAILY WITH MEALS
Qty: 10 TABLET | Refills: 0 | Status: SHIPPED | OUTPATIENT
Start: 2023-02-08 | End: 2023-02-13

## 2023-02-08 RX ADMIN — HYDROCODONE BITARTRATE AND ACETAMINOPHEN 1 TABLET: 7.5; 325 TABLET ORAL at 18:40

## 2023-02-09 NOTE — ED PROVIDER NOTES
Subjective   History of Present Illness  Patient is a 32-year-old female with a history of diabetes, hyperlipidemia, hypertension, anxiety who has a history of bulging disc diagnosed on MRI 2 years ago.  Patient presents to the emergency room after having a fall yesterday morning where she slipped on a cardboard box that was on the floor.  Patient is complaining of diffuse pain in the right lumbar spine and hip region.  Patient states she has numbness on her right leg.  She is able to feel my touch.  Straight leg test patient was positive for pain.  Patient takes gabapentin diazepam Tylenol for symptom relief.        Review of Systems   Musculoskeletal: Positive for back pain and joint swelling.       Past Medical History:   Diagnosis Date   • Allergic    • Anxiety    • Asthma    • Chronic diarrhea    • Depression    • Diabetes mellitus (HCC)    • High blood pressure    • Hyperlipidemia    • Liver disease    • PTSD (post-traumatic stress disorder)    • Sepsis, unspecified organism (HCC) 10/4/2021       Allergies   Allergen Reactions   • Hydroxyquinolines Other (See Comments)     Passed out and was dizzy   • Hydroxyzine Other (See Comments)     Passed out       Past Surgical History:   Procedure Laterality Date   • ADENOIDECTOMY     • EAR TUBES  2015       Family History   Problem Relation Age of Onset   • Liver disease Mother    • Alcohol abuse Mother    • Hypertension Mother    • Dementia Mother    • Liver disease Father    • Alcohol abuse Father    • Hypertension Father    • Diabetes Sister    • Alcohol abuse Brother    • Cancer Paternal Grandmother    • Diabetes Paternal Grandmother    • Hypertension Paternal Grandmother        Social History     Socioeconomic History   • Marital status: Single   Tobacco Use   • Smoking status: Every Day     Packs/day: 0.50     Types: Cigarettes   • Smokeless tobacco: Never   Vaping Use   • Vaping Use: Former   Substance and Sexual Activity   • Alcohol use: Never   • Drug use:  Not Currently   • Sexual activity: Not Currently           Objective   Physical Exam  Vitals reviewed.   Constitutional:       General: She is in acute distress.      Appearance: She is obese. She is not toxic-appearing.   HENT:      Head: Normocephalic and atraumatic.   Cardiovascular:      Rate and Rhythm: Normal rate and regular rhythm.   Pulmonary:      Effort: Pulmonary effort is normal.      Breath sounds: Normal breath sounds.   Abdominal:      General: Bowel sounds are normal.      Palpations: Abdomen is soft.   Skin:     General: Skin is warm and dry.      Capillary Refill: Capillary refill takes less than 2 seconds.   Neurological:      General: No focal deficit present.      Mental Status: She is alert and oriented to person, place, and time.   Psychiatric:         Mood and Affect: Mood normal.         Procedures       Labs Reviewed   COMPREHENSIVE METABOLIC PANEL - Abnormal; Notable for the following components:       Result Value    Glucose 189 (*)     All other components within normal limits    Narrative:     GFR Normal >60  Chronic Kidney Disease <60  Kidney Failure <15     URINALYSIS W/ CULTURE IF INDICATED - Abnormal; Notable for the following components:    Ketones, UA Trace (*)     All other components within normal limits    Narrative:     In absence of clinical symptoms, the presence of pyuria, bacteria, and/or nitrites on the urinalysis result does not correlate with infection.  Urine microscopic not indicated.   CBC WITH AUTO DIFFERENTIAL - Abnormal; Notable for the following components:    WBC 12.54 (*)     Neutrophils, Absolute 9.11 (*)     All other components within normal limits   POCT PEFORM URINE PREGNANCY - Normal   CBC AND DIFFERENTIAL    Narrative:     The following orders were created for panel order CBC & Differential.  Procedure                               Abnormality         Status                     ---------                               -----------         ------                      CBC Auto Differential[[id number]]        Abnormal            Final result                 Please view results for these tests on the individual orders.         ED Course  ED Course as of 02/08/23 2017 Wed Feb 08, 2023 1953 IMPRESSION:  Impression:   1. No acute osseous injury or malalignment in the lumbar spine.  2. There appears to be a disc herniation (central/right paracentral) at  L4-L5 with resultant spinal stenosis (series 5-image 61). [MA]      ED Course User Index  [MA] Al Plascencia MD                                           Medical Decision Making  The patient presents with acute onset of back pain after falling yesterday. Patient does complain of some constipation, and some numbness on the right leg. Patient denies fevers, IV drug use, recent back surgeries or procedures, urinary incontinence, and bowel incontinence. We will start by obtaining a CT L Spine, and may consider MRI, pending CT Spine results. Patient does have diabetes, we will check a BMP and CBC as well.     Patient CT scan returned and it shows the patient has a bulging disc between L4-L5 with resulting spinal stenosis.  Patient needs to follow-up with orthopedics or neurosurgery for further evaluation and management.  Recommended initially conservative management for patient with physical therapy and NSAIDs at this time.  Patient has been updated.    Amount and/or Complexity of Data Reviewed  External Data Reviewed: labs.  Labs: ordered.  Radiology: ordered.  Discussion of management or test interpretation with external provider(s): I considered admission for back pain  fortunately, her/his symptoms improved with our treatment efforts and discussed with patient/family and were offered observation status. Questions were addressed, and they were comfortable and stable for discharge home.       Risk  Prescription drug management.  Parenteral controlled substances.          Final diagnoses:   Lumbar pain with radiation down  right leg   Bulging lumbar disc       ED Disposition  ED Disposition     ED Disposition   Discharge    Condition   Stable    Comment   --             No follow-up provider specified.       Medication List      New Prescriptions    HYDROcodone-acetaminophen 5-325 MG per tablet  Commonly known as: NORCO  Take 1 tablet by mouth Every 6 (Six) Hours As Needed for Severe Pain or Moderate Pain for up to 3 days.     naproxen 500 MG tablet  Commonly known as: NAPROSYN  Take 1 tablet by mouth 2 (Two) Times a Day With Meals for 5 days.           Where to Get Your Medications      These medications were sent to [id number] - [place], [place] - [address] - [phone]  - [phone] FX  [address], [facility] [address]    Phone: [phone]   · HYDROcodone-acetaminophen 5-325 MG per tablet  · naproxen 500 MG tablet          Al Plascencia MD  02/08/23 2017

## 2023-02-10 ENCOUNTER — OFFICE VISIT (OUTPATIENT)
Dept: INTERNAL MEDICINE | Facility: CLINIC | Age: 33
End: 2023-02-10
Payer: COMMERCIAL

## 2023-02-10 VITALS
HEART RATE: 121 BPM | BODY MASS INDEX: 42.12 KG/M2 | DIASTOLIC BLOOD PRESSURE: 74 MMHG | HEIGHT: 66 IN | SYSTOLIC BLOOD PRESSURE: 120 MMHG | WEIGHT: 262.1 LBS | TEMPERATURE: 97.5 F | OXYGEN SATURATION: 97 %

## 2023-02-10 DIAGNOSIS — M51.26 LUMBAR DISC HERNIATION: Primary | ICD-10-CM

## 2023-02-10 DIAGNOSIS — M48.061 SPINAL STENOSIS OF LUMBAR REGION WITHOUT NEUROGENIC CLAUDICATION: ICD-10-CM

## 2023-02-10 PROCEDURE — 99214 OFFICE O/P EST MOD 30 MIN: CPT | Performed by: INTERNAL MEDICINE

## 2023-02-10 RX ORDER — METHYLPREDNISOLONE 4 MG/1
TABLET ORAL
Qty: 21 EACH | Refills: 0 | Status: SHIPPED | OUTPATIENT
Start: 2023-02-10

## 2023-02-10 NOTE — PROGRESS NOTES
Chief Complaint   Patient presents with   • Back Pain     X2 days, lumbar region, patient reports fall, evaluated at [facility] ED on 2/8/23         History:  Emili Caal is a 32 y.o. female who presents today for evaluation of the above problems.      Emili presents today for an acute visit to discuss back pain.  She sustained a mechanical fall on February 8 and went to the emergency room.  She had a CT scan showing disc herniation at L4-L5 with spinal stenosis.  She was recommended to follow-up with us, possibly orthopedic surgery or neurosurgery.  She has weakness in both legs and feels off balance.  She has had right toe numbness but there is no bowel or bladder incontinence.  Due to hydrocodone she has had constipation.      HPI      ROS:  Review of Systems  See above    Current Outpatient Medications:   •  Blood Glucose Monitoring Suppl (ONE TOUCH ULTRA 2) w/Device kit, Use to test blood sugar before meals and at bedtime., Disp: 1 each, Rfl: 0  •  cloNIDine (CATAPRES) 0.1 MG tablet, Take 1 tablet by mouth Daily., Disp: 30 tablet, Rfl: 5  •  diazePAM (VALIUM) 10 MG tablet, Take 10 mg by mouth 2 (Two) Times a Day As Needed for Anxiety., Disp: , Rfl:   •  Dulaglutide 0.75 MG/0.5ML solution pen-injector, Inject 0.75 mg under the skin into the appropriate area as directed 1 (One) Time Per Week., Disp: 4 pen, Rfl: 11  •  gabapentin (NEURONTIN) 600 MG tablet, Take 600 mg by mouth 2 (Two) Times a Day., Disp: , Rfl:   •  glucose blood (OneTouch Ultra) test strip, Use to test blood sugar before meals and at bedtime., Disp: 100 each, Rfl: 2  •  HYDROcodone-acetaminophen (NORCO) 5-325 MG per tablet, Take 1 tablet by mouth Every 6 (Six) Hours As Needed for Severe Pain or Moderate Pain for up to 3 days., Disp: 10 tablet, Rfl: 0  •  Lancets (OneTouch Delica Plus [id number]) misc, Use to test blood sugar before meals and at bedtime., Disp: 100 each, Rfl: 2  •  lisinopril (PRINIVIL,ZESTRIL) 5 MG tablet, TAKE 1 TABLET BY MOUTH  DAILY, Disp: 30 tablet, Rfl: 2  •  naproxen (NAPROSYN) 500 MG tablet, Take 1 tablet by mouth 2 (Two) Times a Day With Meals for 5 days., Disp: 10 tablet, Rfl: 0  •  Vyvanse 60 MG capsule, Take 60 mg by mouth Daily, Disp: , Rfl:   •  methylPREDNISolone (MEDROL) 4 MG dose pack, Take as directed on package instructions., Disp: 21 each, Rfl: 0  •  simvastatin (ZOCOR) 10 MG tablet, Take 10 mg by mouth Every Night., Disp: , Rfl:     Lab Results   Component Value Date    GLUCOSE 189 (H) 02/08/2023    BUN 8 02/08/2023    CREATININE 0.58 02/08/2023    EGFR 125.0 02/08/2023    BCR 13.8 02/08/2023    K 4.2 02/08/2023    CO2 22.0 02/08/2023    CALCIUM 9.2 02/08/2023    ALBUMIN 4.2 02/08/2023    AST 20 02/08/2023    ALT 28 02/08/2023       WBC   Date Value Ref Range Status   02/08/2023 12.54 (H) 3.40 - 10.80 10*3/mm3 Final     RBC   Date Value Ref Range Status   02/08/2023 4.63 3.77 - 5.28 10*6/mm3 Final     Hemoglobin   Date Value Ref Range Status   02/08/2023 13.6 12.0 - 15.9 g/dL Final     Hematocrit   Date Value Ref Range Status   02/08/2023 41.9 34.0 - 46.6 % Final     MCV   Date Value Ref Range Status   02/08/2023 90.5 79.0 - 97.0 fL Final     MCH   Date Value Ref Range Status   02/08/2023 29.4 26.6 - 33.0 pg Final     MCHC   Date Value Ref Range Status   02/08/2023 32.5 31.5 - 35.7 g/dL Final     RDW   Date Value Ref Range Status   02/08/2023 12.9 12.3 - 15.4 % Final     RDW-SD   Date Value Ref Range Status   02/08/2023 42.1 37.0 - 54.0 fl Final     MPV   Date Value Ref Range Status   02/08/2023 10.7 6.0 - 12.0 fL Final     Platelets   Date Value Ref Range Status   02/08/2023 246 140 - 450 10*3/mm3 Final     Neutrophil %   Date Value Ref Range Status   02/08/2023 72.6 42.7 - 76.0 % Final     Lymphocyte %   Date Value Ref Range Status   02/08/2023 21.3 19.6 - 45.3 % Final     Monocyte %   Date Value Ref Range Status   02/08/2023 5.1 5.0 - 12.0 % Final     Eosinophil %   Date Value Ref Range Status   02/08/2023 0.5 0.3 -  "6.2 % Final     Basophil %   Date Value Ref Range Status   02/08/2023 0.2 0.0 - 1.5 % Final     Immature Grans %   Date Value Ref Range Status   02/08/2023 0.3 0.0 - 0.5 % Final     Neutrophils, Absolute   Date Value Ref Range Status   02/08/2023 9.11 (H) 1.70 - 7.00 10*3/mm3 Final     Lymphocytes, Absolute   Date Value Ref Range Status   02/08/2023 2.67 0.70 - 3.10 10*3/mm3 Final     Monocytes, Absolute   Date Value Ref Range Status   02/08/2023 0.64 0.10 - 0.90 10*3/mm3 Final     Eosinophils, Absolute   Date Value Ref Range Status   02/08/2023 0.06 0.00 - 0.40 10*3/mm3 Final     Basophils, Absolute   Date Value Ref Range Status   02/08/2023 0.02 0.00 - 0.20 10*3/mm3 Final     Immature Grans, Absolute   Date Value Ref Range Status   02/08/2023 0.04 0.00 - 0.05 10*3/mm3 Final     nRBC   Date Value Ref Range Status   02/08/2023 0.0 0.0 - 0.2 /100 WBC Final         OBJECTIVE:  Visit Vitals  /74 (BP Location: Left arm, Patient Position: Sitting, Cuff Size: Adult)   Pulse (!) 121   Temp 97.5 °F (36.4 °C) (Temporal)   Ht 167.6 cm (66\")   Wt 119 kg (262 lb 1.6 oz)   LMP  (LMP Unknown)   SpO2 97%   BMI 42.30 kg/m²      Physical Exam  Vitals and nursing note reviewed.   Constitutional:       General: She is not in acute distress.     Appearance: She is well-developed. She is not diaphoretic.   HENT:      Head: Normocephalic and atraumatic.   Eyes:      Pupils: Pupils are equal, round, and reactive to light.   Neck:      Thyroid: No thyromegaly.      Trachea: Phonation normal.   Cardiovascular:      Rate and Rhythm: Tachycardia present.   Pulmonary:      Effort: No respiratory distress.   Musculoskeletal:      Cervical back: Normal.      Thoracic back: Normal.      Lumbar back: Tenderness present. No swelling, edema, deformity or spasms. Decreased range of motion. Positive right straight leg raise test and positive left straight leg raise test.        Back:    Skin:     Coloration: Skin is not pale.      Findings: No " erythema.   Neurological:      Mental Status: She is alert.   Psychiatric:         Speech: Speech is slurred.         Behavior: Behavior normal.         Thought Content: Thought content normal.         Judgment: Judgment normal.      Comments: Speech is slurred         Assessment/Plan    Diagnoses and all orders for this visit:    1. Lumbar disc herniation (Primary)  -     MRI Lumbar Spine Without Contrast; Future  -     Ambulatory Referral to Physical Therapy Evaluate and treat  -     Ambulatory Referral to Neurosurgery  -     methylPREDNISolone (MEDROL) 4 MG dose pack; Take as directed on package instructions.  Dispense: 21 each; Refill: 0    2. Spinal stenosis of lumbar region without neurogenic claudication  -     MRI Lumbar Spine Without Contrast; Future  -     Ambulatory Referral to Physical Therapy Evaluate and treat  -     Ambulatory Referral to Neurosurgery  -     methylPREDNISolone (MEDROL) 4 MG dose pack; Take as directed on package instructions.  Dispense: 21 each; Refill: 0    She has an acute issue with systemic symptoms requiring further work-up and medication management    Recommend conservative treatment with physical therapy.  Would like to avoid further opioids given her use of Valium 10 mg twice a day and risk of unintentional overdose.  Additionally, she has had thoughts of suicide within the last couple months but none currently.  Prescribed a course of steroids, I am aware this could increase her glucose for a short time.  I am also getting an MRI of the lumbar spine for further evaluation of her spinal stenosis.  Instructed go to ER for any worsening symptoms over the weekend.  Referred to neurosurgery for evaluation as well    Return in about 4 weeks (around 3/10/2023) for Annual physical.      KIM Davis MD  14:14 CST  2/10/2023

## 2023-02-16 ENCOUNTER — TELEPHONE (OUTPATIENT)
Dept: INTERNAL MEDICINE | Facility: CLINIC | Age: 33
End: 2023-02-16
Payer: COMMERCIAL

## 2023-02-16 NOTE — TELEPHONE ENCOUNTER
Cherelle with the financial department at [facility] called to inform Dr. Davis that the MRI he ordered for patient was denied by insurance.  She said he can appeal the decision or inform them if the MRI scheduled for 2/20 needs to be cancelled.  The appeal letter explaining the reason for the denial has been scanned into patient's chart.      Per Cherelle, Dr. Davis can call [phone] and talk to a clinical reviewer to appeal the decision.  The order number for the denial is [id number].

## 2023-02-16 NOTE — TELEPHONE ENCOUNTER
Please let her know that her insurance company will not approve the MRI until she finishes a 6-week course of physical therapy.  She should cancel the MRI that is already scheduled on February 20 since the insurance will not cover it

## 2023-03-05 DIAGNOSIS — F51.01 PRIMARY INSOMNIA: ICD-10-CM

## 2023-03-06 RX ORDER — CLONIDINE HYDROCHLORIDE 0.1 MG/1
0.1 TABLET ORAL DAILY
Qty: 30 TABLET | Refills: 5 | Status: SHIPPED | OUTPATIENT
Start: 2023-03-06

## 2023-03-06 NOTE — TELEPHONE ENCOUNTER
Rx Refill Note  Requested Prescriptions     Pending Prescriptions Disp Refills   • cloNIDine (CATAPRES) 0.1 MG tablet [Pharmacy Med Name: CLONIDINE 0.1MG TABLETS] 30 tablet 5     Sig: TAKE 1 TABLET BY MOUTH DAILY      Last office visit with prescribing clinician: 2/10/2023   Last telemedicine visit with prescribing clinician: 3/24/2023   Next office visit with prescribing clinician: 3/24/2023                         Would you like a call back once the refill request has been completed: [] Yes [] No    If the office needs to give you a call back, can they leave a voicemail: [] Yes [] No    ANA LILIA Alarcon  03/06/23, 08:47 CST

## 2023-03-07 DIAGNOSIS — I10 ESSENTIAL HYPERTENSION: ICD-10-CM

## 2023-03-07 DIAGNOSIS — E11.65 TYPE 2 DIABETES MELLITUS WITH HYPERGLYCEMIA, WITHOUT LONG-TERM CURRENT USE OF INSULIN: ICD-10-CM

## 2023-03-07 RX ORDER — LISINOPRIL 5 MG/1
5 TABLET ORAL DAILY
Qty: 30 TABLET | Refills: 5 | Status: SHIPPED | OUTPATIENT
Start: 2023-03-07

## 2023-03-20 ENCOUNTER — TELEPHONE (OUTPATIENT)
Dept: NEUROSURGERY | Facility: CLINIC | Age: 33
End: 2023-03-20
Payer: COMMERCIAL

## 2023-03-20 NOTE — TELEPHONE ENCOUNTER
Attempted to reach patient to confirm their appointment for tomorrow and make sure if they have not received new patient ppw to arrive 10 minutes early to fill it out and if they have had outside imaging to bring that on a cd. I had to leave a  for a call back.     Hub can relay this information.

## 2023-04-14 ENCOUNTER — HOSPITAL ENCOUNTER (EMERGENCY)
Facility: HOSPITAL | Age: 33
Discharge: HOME | End: 2023-04-15
Admitting: STUDENT IN AN ORGANIZED HEALTH CARE EDUCATION/TRAINING PROGRAM
Payer: COMMERCIAL

## 2023-04-14 ENCOUNTER — APPOINTMENT (OUTPATIENT)
Dept: GENERAL RADIOLOGY | Facility: HOSPITAL | Age: 33
End: 2023-04-14
Payer: COMMERCIAL

## 2023-04-14 DIAGNOSIS — R45.851 SUICIDAL IDEATION: Primary | ICD-10-CM

## 2023-04-14 DIAGNOSIS — T50.902A INTENTIONAL OVERDOSE, INITIAL ENCOUNTER: ICD-10-CM

## 2023-04-14 LAB
ALBUMIN SERPL-MCNC: 4.4 G/DL (ref 3.5–5.2)
ALBUMIN/GLOB SERPL: 1.8 G/DL
ALP SERPL-CCNC: 76 U/L (ref 39–117)
ALT SERPL W P-5'-P-CCNC: 23 U/L (ref 1–33)
AMPHET+METHAMPHET UR QL: POSITIVE
AMPHETAMINES UR QL: POSITIVE
ANION GAP SERPL CALCULATED.3IONS-SCNC: 10 MMOL/L (ref 5–15)
APAP SERPL-MCNC: <5 MCG/ML (ref 0–30)
AST SERPL-CCNC: 18 U/L (ref 1–32)
BARBITURATES UR QL SCN: NEGATIVE
BASOPHILS # BLD AUTO: 0.01 10*3/MM3 (ref 0–0.2)
BASOPHILS NFR BLD AUTO: 0.1 % (ref 0–1.5)
BENZODIAZ UR QL SCN: POSITIVE
BILIRUB SERPL-MCNC: 0.4 MG/DL (ref 0–1.2)
BUN SERPL-MCNC: 10 MG/DL (ref 6–20)
BUN/CREAT SERPL: 15.6 (ref 7–25)
BUPRENORPHINE SERPL-MCNC: NEGATIVE NG/ML
CALCIUM SPEC-SCNC: 9.3 MG/DL (ref 8.6–10.5)
CANNABINOIDS SERPL QL: POSITIVE
CHLORIDE SERPL-SCNC: 111 MMOL/L (ref 98–107)
CO2 SERPL-SCNC: 24 MMOL/L (ref 22–29)
COCAINE UR QL: NEGATIVE
CREAT SERPL-MCNC: 0.64 MG/DL (ref 0.57–1)
DEPRECATED RDW RBC AUTO: 45.1 FL (ref 37–54)
EGFRCR SERPLBLD CKD-EPI 2021: 122.1 ML/MIN/1.73
EOSINOPHIL # BLD AUTO: 0.03 10*3/MM3 (ref 0–0.4)
EOSINOPHIL NFR BLD AUTO: 0.4 % (ref 0.3–6.2)
ERYTHROCYTE [DISTWIDTH] IN BLOOD BY AUTOMATED COUNT: 13.2 % (ref 12.3–15.4)
ETHANOL UR QL: <0.01 %
FLUAV RNA RESP QL NAA+PROBE: NOT DETECTED
FLUBV RNA RESP QL NAA+PROBE: NOT DETECTED
GLOBULIN UR ELPH-MCNC: 2.4 GM/DL
GLUCOSE BLDC GLUCOMTR-MCNC: 147 MG/DL (ref 70–130)
GLUCOSE SERPL-MCNC: 142 MG/DL (ref 65–99)
HCG SERPL QL: NEGATIVE
HCT VFR BLD AUTO: 42.5 % (ref 34–46.6)
HGB BLD-MCNC: 13.3 G/DL (ref 12–15.9)
HOLD SPECIMEN: NORMAL
IMM GRANULOCYTES # BLD AUTO: 0.02 10*3/MM3 (ref 0–0.05)
IMM GRANULOCYTES NFR BLD AUTO: 0.2 % (ref 0–0.5)
LYMPHOCYTES # BLD AUTO: 1.81 10*3/MM3 (ref 0.7–3.1)
LYMPHOCYTES NFR BLD AUTO: 22 % (ref 19.6–45.3)
MCH RBC QN AUTO: 29.2 PG (ref 26.6–33)
MCHC RBC AUTO-ENTMCNC: 31.3 G/DL (ref 31.5–35.7)
MCV RBC AUTO: 93.4 FL (ref 79–97)
METHADONE UR QL SCN: NEGATIVE
MONOCYTES # BLD AUTO: 0.47 10*3/MM3 (ref 0.1–0.9)
MONOCYTES NFR BLD AUTO: 5.7 % (ref 5–12)
NEUTROPHILS NFR BLD AUTO: 5.89 10*3/MM3 (ref 1.7–7)
NEUTROPHILS NFR BLD AUTO: 71.6 % (ref 42.7–76)
NRBC BLD AUTO-RTO: 0 /100 WBC (ref 0–0.2)
OPIATES UR QL: NEGATIVE
OXYCODONE UR QL SCN: NEGATIVE
PCP UR QL SCN: NEGATIVE
PLATELET # BLD AUTO: 215 10*3/MM3 (ref 140–450)
PMV BLD AUTO: 11.2 FL (ref 6–12)
POTASSIUM SERPL-SCNC: 4.1 MMOL/L (ref 3.5–5.2)
PROPOXYPH UR QL: NEGATIVE
PROT SERPL-MCNC: 6.8 G/DL (ref 6–8.5)
QT INTERVAL: 384 MS
QTC INTERVAL: 437 MS
RBC # BLD AUTO: 4.55 10*6/MM3 (ref 3.77–5.28)
SALICYLATES SERPL-MCNC: <0.3 MG/DL
SARS-COV-2 RNA RESP QL NAA+PROBE: NOT DETECTED
SODIUM SERPL-SCNC: 145 MMOL/L (ref 136–145)
TRICYCLICS UR QL SCN: NEGATIVE
TSH SERPL DL<=0.05 MIU/L-ACNC: 0.8 UIU/ML (ref 0.27–4.2)
WBC NRBC COR # BLD: 8.23 10*3/MM3 (ref 3.4–10.8)
WHOLE BLOOD HOLD COAG: NORMAL
WHOLE BLOOD HOLD SPECIMEN: NORMAL

## 2023-04-14 PROCEDURE — 71045 X-RAY EXAM CHEST 1 VIEW: CPT

## 2023-04-14 PROCEDURE — 99285 EMERGENCY DEPT VISIT HI MDM: CPT

## 2023-04-14 PROCEDURE — 85025 COMPLETE CBC W/AUTO DIFF WBC: CPT

## 2023-04-14 PROCEDURE — 84443 ASSAY THYROID STIM HORMONE: CPT

## 2023-04-14 PROCEDURE — 84436 ASSAY OF TOTAL THYROXINE: CPT

## 2023-04-14 PROCEDURE — 84703 CHORIONIC GONADOTROPIN ASSAY: CPT

## 2023-04-14 PROCEDURE — 87636 SARSCOV2 & INF A&B AMP PRB: CPT | Performed by: STUDENT IN AN ORGANIZED HEALTH CARE EDUCATION/TRAINING PROGRAM

## 2023-04-14 PROCEDURE — 82962 GLUCOSE BLOOD TEST: CPT

## 2023-04-14 PROCEDURE — 82077 ASSAY SPEC XCP UR&BREATH IA: CPT

## 2023-04-14 PROCEDURE — 80306 DRUG TEST PRSMV INSTRMNT: CPT

## 2023-04-14 PROCEDURE — 80143 DRUG ASSAY ACETAMINOPHEN: CPT

## 2023-04-14 PROCEDURE — 93005 ELECTROCARDIOGRAM TRACING: CPT

## 2023-04-14 PROCEDURE — 80179 DRUG ASSAY SALICYLATE: CPT

## 2023-04-14 PROCEDURE — 80053 COMPREHEN METABOLIC PANEL: CPT

## 2023-04-14 RX ORDER — SODIUM CHLORIDE 0.9 % (FLUSH) 0.9 %
10 SYRINGE (ML) INJECTION AS NEEDED
Status: DISCONTINUED | OUTPATIENT
Start: 2023-04-14 | End: 2023-04-15 | Stop reason: HOSPADM

## 2023-04-14 RX ADMIN — SODIUM CHLORIDE, POTASSIUM CHLORIDE, SODIUM LACTATE AND CALCIUM CHLORIDE 1000 ML: 600; 310; 30; 20 INJECTION, SOLUTION INTRAVENOUS at 20:31

## 2023-04-15 VITALS
OXYGEN SATURATION: 97 % | TEMPERATURE: 98 F | SYSTOLIC BLOOD PRESSURE: 120 MMHG | HEIGHT: 66 IN | DIASTOLIC BLOOD PRESSURE: 76 MMHG | WEIGHT: 242 LBS | HEART RATE: 72 BPM | RESPIRATION RATE: 18 BRPM | BODY MASS INDEX: 38.89 KG/M2

## 2023-04-15 LAB — T4 SERPL-MCNC: 6.8 MCG/DL (ref 4.5–11.7)

## 2023-04-15 NOTE — DISCHARGE INSTRUCTIONS
It was very nice to meet you, Emili. Thank you for allowing us to take care of you today at [facility]. Please use the safety plan for further improvement and return if you have any concerns.    Please understand that an ER evaluation is just the start of your evaluation. We will do what we can, but we are often unable to fully figure out what is causing your symptoms from one evaluation. Thus, our primary goal is to determine whether you need to be evaluated in the hospital or if it is safe for you to go home and see other doctors such as a primary care physician or a specialist on an outpatient basis.     Like we discussed, it is VERY IMPORTANT that you follow up with your primary care doctor (call them to set up an appointment) within the next few days or as soon as possible so that you can be re-evaluated for improvement in your symptoms or for any other questions.     A copy of your results should be included in your paperwork. If you were prescribed any medications, please take them as directed or call us back with any questions.    Please return to the emergency room within 12-48 hours if you experience fever, chills, chest pain or shortness of breath, pain with inspiration/expiration, pain that travels to your arms, neck or back, nausea, vomiting, severe headache, tearing pain in your chest, dizziness, feel as though you are about to pass out, have any worsening symptoms, or any other concerns.   14-Feb-2020 16:46

## 2023-04-15 NOTE — ED PROVIDER NOTES
"Subjective   History of Present Illness  Patient is a 32-year-old female that presents to the emergency department for overdose.  Patient states she took approximately 6 0.1 mg clonidine, 6 10 mg Valium, and 6 600 mg gabapentin around approximately 7104-0319.  Patient states she did take these with the intention of harming herself as she did not want to wake up.  Patient does have a history of suicide attempt in the past, approximately 7 years ago when she overdosed on medication but was unsure of what medication.  Patient aunt is at bedside helping with history.  Aunt states there is a family history of schizophrenia from an uncle and approximately 2 weeks ago patient started having visual hallucinations.  Patient states approximately weeks ago she began having visual and auditory hallucinations.  Patient's aunt states patient thought she was seeing her and do meth in the living room around children.  Patient was unable to explain her auditory hallucinations.  She states when these hallucinations started she became worried that she would have schizophrenia and would rather die than have that disease like her uncle.  Patient states she also has a history of diabetes and was in a \" diabetic coma\" approximately 2 years ago.  Since then she has discontinued her insulin and states since this, her mental health has been worse.  Patient states she does see a psychiatrist but it has not been helping lately.  Patient denies any pain at this time.  She is currently alert and oriented, slightly lethargic.  She denies any chest pain, shortness of breath, or abdominal pain.  She denies any nausea or vomiting.  Patient states she would just like to go to sleep for a \"long time\".  Patient denies any homicidal thoughts.        Review of Systems   Constitutional: Negative for diaphoresis.   Respiratory: Negative for apnea and shortness of breath.    Cardiovascular: Negative for chest pain and palpitations.   Gastrointestinal: " Negative for abdominal pain, diarrhea, nausea and vomiting.   Endocrine: Negative for polydipsia, polyphagia and polyuria.   Genitourinary: Negative for difficulty urinating.   Musculoskeletal: Negative for arthralgias and myalgias.   Skin: Negative for pallor.   Neurological: Negative for dizziness, light-headedness and headaches.   Psychiatric/Behavioral: Positive for hallucinations, self-injury and suicidal ideas. Negative for agitation.   All other systems reviewed and are negative.      Past Medical History:   Diagnosis Date   • Allergic    • Anxiety    • Asthma    • Chronic diarrhea    • Depression    • Diabetes mellitus    • High blood pressure    • Hyperlipidemia    • Liver disease    • PTSD (post-traumatic stress disorder)    • Sepsis, unspecified organism 10/4/2021       Allergies   Allergen Reactions   • Hydroxyquinolines Other (See Comments)     Passed out and was dizzy   • Hydroxyzine Other (See Comments)     Passed out       Past Surgical History:   Procedure Laterality Date   • ADENOIDECTOMY     • EAR TUBES  2015       Family History   Problem Relation Age of Onset   • Liver disease Mother    • Alcohol abuse Mother    • Hypertension Mother    • Dementia Mother    • Liver disease Father    • Alcohol abuse Father    • Hypertension Father    • Diabetes Sister    • Alcohol abuse Brother    • Cancer Paternal Grandmother    • Diabetes Paternal Grandmother    • Hypertension Paternal Grandmother        Social History     Socioeconomic History   • Marital status: Single   Tobacco Use   • Smoking status: Every Day     Packs/day: 0.50     Types: Cigarettes   • Smokeless tobacco: Never   Vaping Use   • Vaping Use: Former   Substance and Sexual Activity   • Alcohol use: Never   • Drug use: Not Currently   • Sexual activity: Not Currently           Objective   Physical Exam  Vitals and nursing note reviewed.   Constitutional:       Appearance: She is obese.   HENT:      Head: Normocephalic and atraumatic.       Nose: Nose normal.      Mouth/Throat:      Mouth: Mucous membranes are moist.      Pharynx: Oropharynx is clear.   Eyes:      Pupils: Pupils are equal, round, and reactive to light.   Cardiovascular:      Rate and Rhythm: Normal rate.      Pulses: Normal pulses.      Heart sounds: Normal heart sounds.   Pulmonary:      Effort: Pulmonary effort is normal.      Breath sounds: Normal breath sounds.   Abdominal:      General: Abdomen is flat. There is no distension.      Palpations: Abdomen is soft.      Tenderness: There is no abdominal tenderness.   Musculoskeletal:         General: Normal range of motion.      Cervical back: Normal range of motion and neck supple.   Skin:     General: Skin is warm and dry.   Neurological:      Mental Status: She is alert and oriented to person, place, and time.   Psychiatric:         Attention and Perception: She perceives auditory and visual (Patient states she has been seeing things can not provide an example of what) hallucinations.         Mood and Affect: Mood is depressed.         Speech: Speech normal.         Behavior: Behavior is slowed.         Thought Content: Thought content is paranoid. Thought content includes suicidal ideation. Thought content includes suicidal plan.         Cognition and Memory: Cognition normal.         Procedures           ED Course  ED Course as of 04/15/23 0326   Fri Apr 14, 2023 1920 Spoke with CHELSEY Ortiz with poison control.  I explained patient took 6 pills of each 0.1 mg clonidine, 10 mg Valium, and 600 mg gabapentin around 9410-6631.  Poison control states that it is too late to administer charcoal but to treat patient with supportive care including fluids and observation for 6 hours.  She states to be aware of possible side effects of slurred speech and ataxia with valium and hypotension with clonidine.  [KF]   2043 Updated patient and family member at bedside about lab results.  Patient is resting comfortably and denies any pain.  Updated  the we will speak with poison control and once patient becomes medically cleared we will consult [facility].  Patient and family are in agreement with this plan. [KF]   2045 Spoke with CHELSEY Ortiz poison control representative. Provided update with lab results, imaging, UDS results as well we a new set of vital signs.  She states to continue monitoring patient for 6 hours after medication administration and then to consult behavioral health.  [KF]   Sat Apr 15, 2023   0001 Patient resting comfortably vital signs are all stable.  Patient has been observed for approximately 6 hours after medication administration.  For his behavioral health will be contacted at this time for psych evaluation. [KF]      ED Course User Index  [KF] Karis Vaughan, APRN                                           Medical Decision Making  Amount and/or Complexity of Data Reviewed  Labs: ordered.  Radiology: ordered.  ECG/medicine tests: ordered.      Risk  Prescription drug management.          Final diagnoses:   Suicidal ideation   Intentional overdose, initial encounter       ED Disposition  ED Disposition     ED Disposition   Discharge    Condition   Stable    Comment   --             LOUANN Davis MD  [address] [facility] [address]  [facility] [id number] [phone]    Call in 1 day  As needed, If symptoms worsen         Medication List      No changes were made to your prescriptions during this visit.          Justin Mustafa MD  04/15/23 0326

## 2023-04-15 NOTE — ED NOTES
THIS NURSE SPOKE WITH LAVERN FROM [facility], PT IS CLEARED TO GO HOME. DEVELOPED A SAFETY PLAN WITH PT AND PT'S AUNT.

## 2023-04-15 NOTE — ED NOTES
PT APPEARS TO BE SLEEPING COMFORTABLY. FLUIDS HUNG, CHECKED ON PT AND ASSURED SHE DOESN'T NEED ANYTHING. SITTER AND PT AUNT AT BEDSIDE.

## 2023-04-15 NOTE — ED NOTES
COPY OF SAFETY PLAN GIVEN TO PT. PT AND AUNT BOTH VERBALIZED UNDERSTANDING ABOUT DISCHARGE INSTRUCTIONS/PLAN.

## 2023-04-18 ENCOUNTER — NURSE TRIAGE (OUTPATIENT)
Dept: CALL CENTER | Facility: HOSPITAL | Age: 33
End: 2023-04-18
Payer: COMMERCIAL

## 2023-04-18 ENCOUNTER — HOSPITAL ENCOUNTER (INPATIENT)
Age: 33
LOS: 1 days | Discharge: NURSING, REHAB OR LONG-TERM CARE | DRG: 883 | End: 2023-04-19
Attending: EMERGENCY MEDICINE | Admitting: STUDENT IN AN ORGANIZED HEALTH CARE EDUCATION/TRAINING PROGRAM
Payer: MEDICAID

## 2023-04-18 DIAGNOSIS — F22 PARANOIA (HCC): Primary | ICD-10-CM

## 2023-04-18 DIAGNOSIS — F19.10 POLYSUBSTANCE ABUSE (HCC): ICD-10-CM

## 2023-04-18 DIAGNOSIS — Z00.8 ENCOUNTER FOR PSYCHOLOGICAL EVALUATION: ICD-10-CM

## 2023-04-18 PROBLEM — I10 HYPERTENSION: Status: ACTIVE | Noted: 2023-04-18

## 2023-04-18 LAB
ALBUMIN SERPL-MCNC: 4.4 G/DL (ref 3.5–5.2)
ALP SERPL-CCNC: 83 U/L (ref 35–104)
ALT SERPL-CCNC: 18 U/L (ref 5–33)
AMPHET UR QL SCN: POSITIVE
ANION GAP SERPL CALCULATED.3IONS-SCNC: 11 MMOL/L (ref 7–19)
AST SERPL-CCNC: 17 U/L (ref 5–32)
BARBITURATES UR QL SCN: NEGATIVE
BASOPHILS # BLD: 0 K/UL (ref 0–0.2)
BASOPHILS NFR BLD: 0.3 % (ref 0–1)
BENZODIAZ UR QL SCN: POSITIVE
BILIRUB SERPL-MCNC: 0.4 MG/DL (ref 0.2–1.2)
BILIRUB UR QL STRIP: NEGATIVE
BUN SERPL-MCNC: 11 MG/DL (ref 6–20)
BUPRENORPHINE URINE: NEGATIVE
CALCIUM SERPL-MCNC: 9.4 MG/DL (ref 8.6–10)
CANNABINOIDS UR QL SCN: POSITIVE
CHLORIDE SERPL-SCNC: 108 MMOL/L (ref 98–111)
CLARITY UR: CLEAR
CO2 SERPL-SCNC: 24 MMOL/L (ref 22–29)
COCAINE UR QL SCN: NEGATIVE
COLOR UR: YELLOW
CREAT SERPL-MCNC: 0.6 MG/DL (ref 0.5–0.9)
DRUG SCREEN COMMENT UR-IMP: ABNORMAL
EOSINOPHIL # BLD: 0 K/UL (ref 0–0.6)
EOSINOPHIL NFR BLD: 0.3 % (ref 0–5)
ERYTHROCYTE [DISTWIDTH] IN BLOOD BY AUTOMATED COUNT: 13.1 % (ref 11.5–14.5)
ETHANOLAMINE SERPL-MCNC: <10 MG/DL (ref 0–0.08)
GLUCOSE SERPL-MCNC: 140 MG/DL (ref 74–109)
GLUCOSE UR STRIP.AUTO-MCNC: NEGATIVE MG/DL
HCG SERPL QL: NEGATIVE
HCT VFR BLD AUTO: 46.7 % (ref 37–47)
HGB BLD-MCNC: 14.7 G/DL (ref 12–16)
HGB UR STRIP.AUTO-MCNC: NEGATIVE MG/L
IMM GRANULOCYTES # BLD: 0 K/UL
KETONES UR STRIP.AUTO-MCNC: ABNORMAL MG/DL
LEUKOCYTE ESTERASE UR QL STRIP.AUTO: NEGATIVE
LYMPHOCYTES # BLD: 2.5 K/UL (ref 1.1–4.5)
LYMPHOCYTES NFR BLD: 23.8 % (ref 20–40)
MCH RBC QN AUTO: 29.9 PG (ref 27–31)
MCHC RBC AUTO-ENTMCNC: 31.5 G/DL (ref 33–37)
MCV RBC AUTO: 94.9 FL (ref 81–99)
METHADONE UR QL SCN: NEGATIVE
METHAMPHETAMINE, URINE: POSITIVE
MONOCYTES # BLD: 0.7 K/UL (ref 0–0.9)
MONOCYTES NFR BLD: 6.4 % (ref 0–10)
NEUTROPHILS # BLD: 7.3 K/UL (ref 1.5–7.5)
NEUTS SEG NFR BLD: 68.9 % (ref 50–65)
NITRITE UR QL STRIP.AUTO: NEGATIVE
OPIATES UR QL SCN: NEGATIVE
OXYCODONE UR QL SCN: NEGATIVE
PCP UR QL SCN: NEGATIVE
PH UR STRIP.AUTO: 5.5 [PH] (ref 5–8)
PLATELET # BLD AUTO: 221 K/UL (ref 130–400)
PMV BLD AUTO: 10.8 FL (ref 9.4–12.3)
POTASSIUM SERPL-SCNC: 4.1 MMOL/L (ref 3.5–5)
PROPOXYPH UR QL SCN: NEGATIVE
PROT SERPL-MCNC: 7.1 G/DL (ref 6.6–8.7)
PROT UR STRIP.AUTO-MCNC: NEGATIVE MG/DL
RBC # BLD AUTO: 4.92 M/UL (ref 4.2–5.4)
SARS-COV-2 RDRP RESP QL NAA+PROBE: NOT DETECTED
SODIUM SERPL-SCNC: 143 MMOL/L (ref 136–145)
SP GR UR STRIP.AUTO: 1.01 (ref 1–1.03)
TRICYCLIC, URINE: NEGATIVE
UROBILINOGEN UR STRIP.AUTO-MCNC: 0.2 E.U./DL
WBC # BLD AUTO: 10.6 K/UL (ref 4.8–10.8)

## 2023-04-18 PROCEDURE — 84703 CHORIONIC GONADOTROPIN ASSAY: CPT

## 2023-04-18 PROCEDURE — 6360000002 HC RX W HCPCS: Performed by: NURSE PRACTITIONER

## 2023-04-18 PROCEDURE — 82077 ASSAY SPEC XCP UR&BREATH IA: CPT

## 2023-04-18 PROCEDURE — 80306 DRUG TEST PRSMV INSTRMNT: CPT

## 2023-04-18 PROCEDURE — 6370000000 HC RX 637 (ALT 250 FOR IP): Performed by: EMERGENCY MEDICINE

## 2023-04-18 PROCEDURE — 99285 EMERGENCY DEPT VISIT HI MDM: CPT

## 2023-04-18 PROCEDURE — 80053 COMPREHEN METABOLIC PANEL: CPT

## 2023-04-18 PROCEDURE — 81003 URINALYSIS AUTO W/O SCOPE: CPT

## 2023-04-18 PROCEDURE — 6360000002 HC RX W HCPCS: Performed by: EMERGENCY MEDICINE

## 2023-04-18 PROCEDURE — 85025 COMPLETE CBC W/AUTO DIFF WBC: CPT

## 2023-04-18 PROCEDURE — 6370000000 HC RX 637 (ALT 250 FOR IP): Performed by: NURSE PRACTITIONER

## 2023-04-18 PROCEDURE — 96372 THER/PROPH/DIAG INJ SC/IM: CPT

## 2023-04-18 PROCEDURE — 87635 SARS-COV-2 COVID-19 AMP PRB: CPT

## 2023-04-18 PROCEDURE — 36415 COLL VENOUS BLD VENIPUNCTURE: CPT

## 2023-04-18 PROCEDURE — 2580000003 HC RX 258: Performed by: NURSE PRACTITIONER

## 2023-04-18 PROCEDURE — 1210000000 HC MED SURG R&B

## 2023-04-18 RX ORDER — SODIUM CHLORIDE 9 MG/ML
INJECTION, SOLUTION INTRAVENOUS PRN
Status: DISCONTINUED | OUTPATIENT
Start: 2023-04-18 | End: 2023-04-19 | Stop reason: HOSPADM

## 2023-04-18 RX ORDER — POLYETHYLENE GLYCOL 3350 17 G/17G
17 POWDER, FOR SOLUTION ORAL DAILY PRN
Status: DISCONTINUED | OUTPATIENT
Start: 2023-04-18 | End: 2023-04-19 | Stop reason: HOSPADM

## 2023-04-18 RX ORDER — ACETAMINOPHEN 325 MG/1
650 TABLET ORAL EVERY 6 HOURS PRN
Status: DISCONTINUED | OUTPATIENT
Start: 2023-04-18 | End: 2023-04-19 | Stop reason: HOSPADM

## 2023-04-18 RX ORDER — ENOXAPARIN SODIUM 100 MG/ML
30 INJECTION SUBCUTANEOUS 2 TIMES DAILY
Status: DISCONTINUED | OUTPATIENT
Start: 2023-04-18 | End: 2023-04-19 | Stop reason: HOSPADM

## 2023-04-18 RX ORDER — NICOTINE 21 MG/24HR
1 PATCH, TRANSDERMAL 24 HOURS TRANSDERMAL ONCE
Status: COMPLETED | OUTPATIENT
Start: 2023-04-18 | End: 2023-04-19

## 2023-04-18 RX ORDER — SODIUM CHLORIDE 9 MG/ML
INJECTION, SOLUTION INTRAVENOUS CONTINUOUS
Status: DISCONTINUED | OUTPATIENT
Start: 2023-04-18 | End: 2023-04-19 | Stop reason: HOSPADM

## 2023-04-18 RX ORDER — LANOLIN ALCOHOL/MO/W.PET/CERES
3 CREAM (GRAM) TOPICAL NIGHTLY
Status: DISCONTINUED | OUTPATIENT
Start: 2023-04-18 | End: 2023-04-19 | Stop reason: HOSPADM

## 2023-04-18 RX ORDER — ACETAMINOPHEN 650 MG/1
650 SUPPOSITORY RECTAL EVERY 6 HOURS PRN
Status: DISCONTINUED | OUTPATIENT
Start: 2023-04-18 | End: 2023-04-19 | Stop reason: HOSPADM

## 2023-04-18 RX ORDER — LISINOPRIL 5 MG/1
5 TABLET ORAL DAILY
Status: DISCONTINUED | OUTPATIENT
Start: 2023-04-18 | End: 2023-04-19 | Stop reason: HOSPADM

## 2023-04-18 RX ORDER — ONDANSETRON 4 MG/1
4 TABLET, ORALLY DISINTEGRATING ORAL EVERY 8 HOURS PRN
Status: DISCONTINUED | OUTPATIENT
Start: 2023-04-18 | End: 2023-04-19 | Stop reason: HOSPADM

## 2023-04-18 RX ORDER — ONDANSETRON 2 MG/ML
4 INJECTION INTRAMUSCULAR; INTRAVENOUS EVERY 6 HOURS PRN
Status: DISCONTINUED | OUTPATIENT
Start: 2023-04-18 | End: 2023-04-19 | Stop reason: HOSPADM

## 2023-04-18 RX ORDER — DIAZEPAM 10 MG/1
10 TABLET ORAL EVERY 12 HOURS PRN
Status: ON HOLD | COMMUNITY
End: 2023-04-24 | Stop reason: HOSPADM

## 2023-04-18 RX ORDER — SODIUM CHLORIDE 0.9 % (FLUSH) 0.9 %
5-40 SYRINGE (ML) INJECTION EVERY 12 HOURS SCHEDULED
Status: DISCONTINUED | OUTPATIENT
Start: 2023-04-18 | End: 2023-04-19 | Stop reason: HOSPADM

## 2023-04-18 RX ORDER — ALPRAZOLAM 1 MG/1
TABLET ORAL
Status: ON HOLD | COMMUNITY
End: 2023-04-24 | Stop reason: HOSPADM

## 2023-04-18 RX ORDER — LISDEXAMFETAMINE DIMESYLATE 60 MG/1
CAPSULE ORAL
Status: ON HOLD | COMMUNITY
Start: 2022-08-11 | End: 2023-04-24 | Stop reason: HOSPADM

## 2023-04-18 RX ORDER — LISINOPRIL 5 MG/1
TABLET ORAL
COMMUNITY
Start: 2023-03-07

## 2023-04-18 RX ORDER — SODIUM CHLORIDE 0.9 % (FLUSH) 0.9 %
5-40 SYRINGE (ML) INJECTION PRN
Status: DISCONTINUED | OUTPATIENT
Start: 2023-04-18 | End: 2023-04-19 | Stop reason: HOSPADM

## 2023-04-18 RX ORDER — HALOPERIDOL 5 MG/ML
5 INJECTION INTRAMUSCULAR ONCE
Status: COMPLETED | OUTPATIENT
Start: 2023-04-18 | End: 2023-04-18

## 2023-04-18 RX ORDER — LORAZEPAM 2 MG/ML
2 INJECTION INTRAMUSCULAR ONCE
Status: COMPLETED | OUTPATIENT
Start: 2023-04-18 | End: 2023-04-18

## 2023-04-18 RX ADMIN — LORAZEPAM 2 MG: 2 INJECTION INTRAMUSCULAR; INTRAVENOUS at 10:13

## 2023-04-18 RX ADMIN — LISINOPRIL 5 MG: 5 TABLET ORAL at 17:55

## 2023-04-18 RX ADMIN — HALOPERIDOL LACTATE 5 MG: 5 INJECTION, SOLUTION INTRAMUSCULAR at 10:13

## 2023-04-18 RX ADMIN — SODIUM CHLORIDE: 9 INJECTION, SOLUTION INTRAVENOUS at 17:53

## 2023-04-18 RX ADMIN — Medication 3 MG: at 20:36

## 2023-04-18 RX ADMIN — ENOXAPARIN SODIUM 30 MG: 100 INJECTION SUBCUTANEOUS at 20:36

## 2023-04-18 RX ADMIN — SODIUM CHLORIDE, PRESERVATIVE FREE 10 ML: 5 INJECTION INTRAVENOUS at 20:37

## 2023-04-18 ASSESSMENT — ENCOUNTER SYMPTOMS
CONSTIPATION: 0
WHEEZING: 0
BLOOD IN STOOL: 0
ABDOMINAL PAIN: 0
RESPIRATORY NEGATIVE: 1
SHORTNESS OF BREATH: 1
SORE THROAT: 0
SINUS PAIN: 0
NAUSEA: 1
DIARRHEA: 0
ABDOMINAL DISTENTION: 0
EYES NEGATIVE: 1
GASTROINTESTINAL NEGATIVE: 1
TROUBLE SWALLOWING: 0
VOMITING: 0
COUGH: 0

## 2023-04-18 ASSESSMENT — LIFESTYLE VARIABLES
HOW OFTEN DO YOU HAVE A DRINK CONTAINING ALCOHOL: NEVER
HOW MANY STANDARD DRINKS CONTAINING ALCOHOL DO YOU HAVE ON A TYPICAL DAY: PATIENT DOES NOT DRINK

## 2023-04-18 ASSESSMENT — PATIENT HEALTH QUESTIONNAIRE - PHQ9: SUM OF ALL RESPONSES TO PHQ QUESTIONS 1-9: 4

## 2023-04-18 NOTE — ED NOTES
Pt became agitated and began to argue with family member ([de-identified]). I requested the [address] [facility] exit room and go to lobby due to pt agitation.       Pascale Deleon RN  04/18/23 5573

## 2023-04-18 NOTE — ED NOTES
Paged [phone] (evaluation)      May be a delay. Treatment teams at 8:00, but will be here as soon possible.      Pura Dandy  04/18/23 0740       Pura Dandy  04/18/23 0745

## 2023-04-18 NOTE — PLAN OF CARE
Problem: Self Harm/Suicidality  Goal: Will have no self-injury during hospital stay  Description: INTERVENTIONS:  1. Ensure constant observer at bedside with Q15M safety checks  2. Maintain a safe environment  3. Secure patient belongings  4. Ensure family/visitors adhere to safety recommendations  5. Ensure safety tray has been added to patient's diet order  6.   Every shift and PRN: Re-assess suicidal risk via Frequent Screener    Outcome: Not Progressing     Problem: Discharge Planning  Goal: Discharge to home or other facility with appropriate resources  Outcome: Not Progressing  Flowsheets (Taken 4/18/2023 1345)  Discharge to home or other facility with appropriate resources:   Identify barriers to discharge with patient and caregiver   Arrange for needed discharge resources and transportation as appropriate   Identify discharge learning needs (meds, wound care, etc)
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
## 2023-04-18 NOTE — ED PROVIDER NOTES
Vitals:    Vitals:    04/18/23 0452   BP: (!) 145/88   Pulse: 88   Resp: 14   Temp: 97.8 °F (36.6 °C)   SpO2: 97%   Weight: 239 lb (108.4 kg)   Height: 5' 6\" (1.676 m)       MDM    VSS, calm here so far, questionable history of hallucinations and odd behavior. UDS noted. At [facility] 2 days ago after intentional OD and was DC. Assumed care from Dr. Shaneka Weiss this morning pending LETICIA evaluation. 5275. Psychiatry has evaluated the patient given her paranoia and worsening functional status at home and polysubstance abuse have recommended admission. However due to her polysubstance drug use have requested medical admission initially and will see as consult. Have discussed with hospitalist for admission      CONSULTS:  [facility] TO PSYCHIATRY    PROCEDURES:  Unless otherwise noted below, none     Procedures    FINAL IMPRESSION      1. Paranoia ([facility] [address]    2. Encounter for psychological evaluation    3. Polysubstance abuse ([facility]          DISPOSITION/PLAN   DISPOSITION Admitted    PATIENT REFERRED TO:  No follow-up provider specified.     DISCHARGE MEDICATIONS:  New Prescriptions    No medications on file          (Please note that portions ofthis note were completed with a voice recognition program.  Efforts were made to edit the dictations but occasionally words are mis-transcribed.)    Hay Mckeon MD(electronically signed)  Attending Emergency Physician         Derek Amado MD  04/18/23 3950
Cigarettes    Smokeless tobacco: Never   Substance and Sexual Activity    Alcohol use: Not Currently    Drug use: Not Currently       SCREENINGS             PHYSICAL EXAM    (up to 7 for level 4, 8 or more for level 5)     ED Triage Vitals [04/18/23 0452]   BP Temp Temp src Heart Rate Resp SpO2 Height Weight   (!) 145/88 97.8 °F (36.6 °C) -- 88 14 97 % 5' 6\" (1.676 m) 239 lb (108.4 kg)       Physical Exam  Vitals and nursing note reviewed. Constitutional:       General: She is not in acute distress. Appearance: She is well-developed. She is not toxic-appearing or diaphoretic. HENT:      Head: Normocephalic and atraumatic. Mouth/Throat:      Mouth: Mucous membranes are moist.      Pharynx: Oropharynx is clear. Eyes:      General: No scleral icterus. Right eye: No discharge. Left eye: No discharge. Pupils: Pupils are equal, round, and reactive to light. Cardiovascular:      Rate and Rhythm: Normal rate and regular rhythm. Pulmonary:      Effort: Pulmonary effort is normal. No respiratory distress. Breath sounds: No stridor. Abdominal:      General: There is no distension. Musculoskeletal:         General: No deformity. Normal range of motion. Cervical back: Normal range of motion. Skin:     General: Skin is warm and dry. Neurological:      General: No focal deficit present. Mental Status: She is alert and oriented to person, place, and time. GCS: GCS eye subscore is 4. GCS verbal subscore is 5. GCS motor subscore is 6. Cranial Nerves: No cranial nerve deficit. Motor: No abnormal muscle tone. Psychiatric:         Mood and Affect: Mood is anxious.        DIAGNOSTIC RESULTS     EKG: All EKG's are interpreted by the Emergency Department Physician who either signs or Co-signs this chart in the absence of a cardiologist.        RADIOLOGY:   Non-plain film images such as CT, Ultrasound and MRI are read by the radiologist. Plainradiographic images
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
## 2023-04-18 NOTE — ED NOTES
Pt placed in paper scrubs. Personal belongs removed and given to security. Suicidal Flowsheet Initiated. Sitter at bedside.        Sathya Logan RN  04/18/23 1530

## 2023-04-19 ENCOUNTER — HOSPITAL ENCOUNTER (INPATIENT)
Age: 33
LOS: 5 days | Discharge: HOME | DRG: 885 | End: 2023-04-24
Attending: PSYCHIATRY & NEUROLOGY | Admitting: PSYCHIATRY & NEUROLOGY
Payer: MEDICAID

## 2023-04-19 VITALS
DIASTOLIC BLOOD PRESSURE: 67 MMHG | RESPIRATION RATE: 18 BRPM | WEIGHT: 242.31 LBS | HEART RATE: 91 BPM | SYSTOLIC BLOOD PRESSURE: 106 MMHG | TEMPERATURE: 98.4 F | OXYGEN SATURATION: 97 % | HEIGHT: 66 IN | BODY MASS INDEX: 38.94 KG/M2

## 2023-04-19 PROBLEM — F22 PARANOIA (HCC): Status: ACTIVE | Noted: 2023-04-19

## 2023-04-19 LAB
ANION GAP SERPL CALCULATED.3IONS-SCNC: 12 MMOL/L (ref 7–19)
BASOPHILS # BLD: 0 K/UL (ref 0–0.2)
BASOPHILS NFR BLD: 0.2 % (ref 0–1)
BUN SERPL-MCNC: 11 MG/DL (ref 6–20)
CALCIUM SERPL-MCNC: 9.1 MG/DL (ref 8.6–10)
CHLORIDE SERPL-SCNC: 109 MMOL/L (ref 98–111)
CO2 SERPL-SCNC: 23 MMOL/L (ref 22–29)
CREAT SERPL-MCNC: 0.6 MG/DL (ref 0.5–0.9)
EOSINOPHIL # BLD: 0.1 K/UL (ref 0–0.6)
EOSINOPHIL NFR BLD: 0.7 % (ref 0–5)
ERYTHROCYTE [DISTWIDTH] IN BLOOD BY AUTOMATED COUNT: 13.1 % (ref 11.5–14.5)
GLUCOSE SERPL-MCNC: 118 MG/DL (ref 74–109)
HCT VFR BLD AUTO: 43.7 % (ref 37–47)
HGB BLD-MCNC: 14.3 G/DL (ref 12–16)
IMM GRANULOCYTES # BLD: 0 K/UL
LYMPHOCYTES # BLD: 3.2 K/UL (ref 1.1–4.5)
LYMPHOCYTES NFR BLD: 30.1 % (ref 20–40)
MCH RBC QN AUTO: 30.4 PG (ref 27–31)
MCHC RBC AUTO-ENTMCNC: 32.7 G/DL (ref 33–37)
MCV RBC AUTO: 93 FL (ref 81–99)
MONOCYTES # BLD: 0.7 K/UL (ref 0–0.9)
MONOCYTES NFR BLD: 6.3 % (ref 0–10)
NEUTROPHILS # BLD: 6.5 K/UL (ref 1.5–7.5)
NEUTS SEG NFR BLD: 62.5 % (ref 50–65)
PLATELET # BLD AUTO: 228 K/UL (ref 130–400)
PMV BLD AUTO: 11.7 FL (ref 9.4–12.3)
POTASSIUM SERPL-SCNC: 4.1 MMOL/L (ref 3.5–5)
RBC # BLD AUTO: 4.7 M/UL (ref 4.2–5.4)
SARS-COV-2 RDRP RESP QL NAA+PROBE: NOT DETECTED
SODIUM SERPL-SCNC: 144 MMOL/L (ref 136–145)
WBC # BLD AUTO: 10.5 K/UL (ref 4.8–10.8)

## 2023-04-19 PROCEDURE — 87635 SARS-COV-2 COVID-19 AMP PRB: CPT

## 2023-04-19 PROCEDURE — 2580000003 HC RX 258: Performed by: NURSE PRACTITIONER

## 2023-04-19 PROCEDURE — 1240000000 HC EMOTIONAL WELLNESS R&B

## 2023-04-19 PROCEDURE — 94760 N-INVAS EAR/PLS OXIMETRY 1: CPT

## 2023-04-19 PROCEDURE — 6370000000 HC RX 637 (ALT 250 FOR IP): Performed by: PSYCHIATRY & NEUROLOGY

## 2023-04-19 PROCEDURE — 80048 BASIC METABOLIC PNL TOTAL CA: CPT

## 2023-04-19 PROCEDURE — 85025 COMPLETE CBC W/AUTO DIFF WBC: CPT

## 2023-04-19 PROCEDURE — 6370000000 HC RX 637 (ALT 250 FOR IP): Performed by: NURSE PRACTITIONER

## 2023-04-19 PROCEDURE — 6360000002 HC RX W HCPCS: Performed by: NURSE PRACTITIONER

## 2023-04-19 PROCEDURE — 36415 COLL VENOUS BLD VENIPUNCTURE: CPT

## 2023-04-19 RX ORDER — POLYETHYLENE GLYCOL 3350 17 G/17G
17 POWDER, FOR SOLUTION ORAL DAILY PRN
Status: DISCONTINUED | OUTPATIENT
Start: 2023-04-19 | End: 2023-04-20

## 2023-04-19 RX ORDER — TRAMADOL HYDROCHLORIDE 50 MG/1
50 TABLET ORAL 2 TIMES DAILY
Status: ON HOLD | COMMUNITY
End: 2023-04-24 | Stop reason: HOSPADM

## 2023-04-19 RX ORDER — ONDANSETRON 4 MG/1
4 TABLET, ORALLY DISINTEGRATING ORAL EVERY 8 HOURS PRN
Status: CANCELLED | OUTPATIENT
Start: 2023-04-19

## 2023-04-19 RX ORDER — TRAZODONE HYDROCHLORIDE 50 MG/1
50 TABLET ORAL NIGHTLY PRN
Status: DISCONTINUED | OUTPATIENT
Start: 2023-04-19 | End: 2023-04-20

## 2023-04-19 RX ORDER — ACETAMINOPHEN 650 MG/1
650 SUPPOSITORY RECTAL EVERY 6 HOURS PRN
Status: CANCELLED | OUTPATIENT
Start: 2023-04-19

## 2023-04-19 RX ORDER — ALBUTEROL SULFATE 90 UG/1
2 AEROSOL, METERED RESPIRATORY (INHALATION) EVERY 6 HOURS PRN
COMMUNITY

## 2023-04-19 RX ORDER — ACETAMINOPHEN 325 MG/1
650 TABLET ORAL EVERY 4 HOURS PRN
Status: DISCONTINUED | OUTPATIENT
Start: 2023-04-19 | End: 2023-04-24 | Stop reason: HOSPADM

## 2023-04-19 RX ORDER — NORELGESTROMIN AND ETHINYL ESTRADIOL 150; 35 UG/D; UG/D
1 PATCH TRANSDERMAL WEEKLY
Status: ON HOLD | COMMUNITY
End: 2023-04-24 | Stop reason: HOSPADM

## 2023-04-19 RX ORDER — DOXAZOSIN MESYLATE 1 MG/1
1 TABLET ORAL NIGHTLY
COMMUNITY

## 2023-04-19 RX ORDER — DOXAZOSIN MESYLATE 1 MG/1
1 TABLET ORAL NIGHTLY
Status: ON HOLD | COMMUNITY
End: 2023-04-19

## 2023-04-19 RX ORDER — CLONIDINE HYDROCHLORIDE 0.2 MG/1
0.1 TABLET ORAL DAILY
Status: ON HOLD | COMMUNITY
End: 2023-04-19

## 2023-04-19 RX ORDER — ACETAMINOPHEN 325 MG/1
650 TABLET ORAL EVERY 6 HOURS PRN
Status: CANCELLED | OUTPATIENT
Start: 2023-04-19

## 2023-04-19 RX ORDER — LANOLIN ALCOHOL/MO/W.PET/CERES
3 CREAM (GRAM) TOPICAL NIGHTLY
Status: CANCELLED | OUTPATIENT
Start: 2023-04-19

## 2023-04-19 RX ORDER — CLONIDINE HYDROCHLORIDE 0.1 MG/1
0.1 TABLET ORAL DAILY
Status: ON HOLD | COMMUNITY
End: 2023-04-24 | Stop reason: HOSPADM

## 2023-04-19 RX ORDER — SEMAGLUTIDE 1.34 MG/ML
INJECTION, SOLUTION SUBCUTANEOUS
Status: ON HOLD | COMMUNITY
End: 2023-04-24 | Stop reason: HOSPADM

## 2023-04-19 RX ORDER — POLYETHYLENE GLYCOL 3350 17 G/17G
17 POWDER, FOR SOLUTION ORAL DAILY PRN
Status: CANCELLED | OUTPATIENT
Start: 2023-04-19

## 2023-04-19 RX ORDER — ONDANSETRON 2 MG/ML
4 INJECTION INTRAMUSCULAR; INTRAVENOUS EVERY 6 HOURS PRN
Status: CANCELLED | OUTPATIENT
Start: 2023-04-19

## 2023-04-19 RX ORDER — GABAPENTIN 600 MG/1
600 TABLET ORAL 2 TIMES DAILY
COMMUNITY

## 2023-04-19 RX ORDER — DULAGLUTIDE 0.75 MG/.5ML
0.75 INJECTION, SOLUTION SUBCUTANEOUS WEEKLY
Status: ON HOLD | COMMUNITY
End: 2023-04-24 | Stop reason: HOSPADM

## 2023-04-19 RX ORDER — NICOTINE 21 MG/24HR
1 PATCH, TRANSDERMAL 24 HOURS TRANSDERMAL DAILY
Status: DISCONTINUED | OUTPATIENT
Start: 2023-04-19 | End: 2023-04-24 | Stop reason: HOSPADM

## 2023-04-19 RX ORDER — LISINOPRIL 2.5 MG/1
5 TABLET ORAL DAILY
Status: CANCELLED | OUTPATIENT
Start: 2023-04-20

## 2023-04-19 RX ORDER — NAPROXEN 500 MG/1
500 TABLET ORAL 2 TIMES DAILY WITH MEALS
Status: ON HOLD | COMMUNITY
End: 2023-04-24 | Stop reason: HOSPADM

## 2023-04-19 RX ADMIN — LISINOPRIL 5 MG: 5 TABLET ORAL at 09:41

## 2023-04-19 RX ADMIN — SODIUM CHLORIDE, PRESERVATIVE FREE 10 ML: 5 INJECTION INTRAVENOUS at 09:42

## 2023-04-19 RX ADMIN — SODIUM CHLORIDE: 9 INJECTION, SOLUTION INTRAVENOUS at 07:23

## 2023-04-19 RX ADMIN — TRAZODONE HYDROCHLORIDE 50 MG: 50 TABLET ORAL at 21:30

## 2023-04-19 RX ADMIN — ENOXAPARIN SODIUM 30 MG: 100 INJECTION SUBCUTANEOUS at 09:41

## 2023-04-19 NOTE — PLAN OF CARE
Problem: Self Harm/Suicidality  Goal: Will have no self-injury during hospital stay  Description: INTERVENTIONS:  1. Ensure constant observer at bedside with Q15M safety checks  2. Maintain a safe environment  3. Secure patient belongings  4. Ensure family/visitors adhere to safety recommendations  5. Ensure safety tray has been added to patient's diet order  6.   Every shift and PRN: Re-assess suicidal risk via Frequent Screener    Outcome: Progressing     Problem: Discharge Planning  Goal: Discharge to home or other facility with appropriate resources  Outcome: Progressing  Flowsheets (Taken 4/19/2023 0942)  Discharge to home or other facility with appropriate resources:   Identify barriers to discharge with patient and caregiver   Arrange for needed discharge resources and transportation as appropriate     Problem: Safety - Adult  Goal: Free from fall injury  Outcome: Progressing     Problem: ABCDS Injury Assessment  Goal: Absence of physical injury  Outcome: Progressing     Problem: Risk for Elopement  Goal: Patient will not exit the unit/facility without proper excort  Outcome: Progressing  Flowsheets (Taken 4/19/2023 0945)  Nursing Interventions for Elopement Risk:   Shoes and clothing collected and placed in gown attire   Assist with personal care needs such as toileting, eating, dressing, as needed to reduce the risk of wandering

## 2023-04-19 NOTE — PLAN OF CARE
Problem: Self Harm/Suicidality  Goal: Will have no self-injury during hospital stay  Description: INTERVENTIONS:  1. Ensure constant observer at bedside with Q15M safety checks  2. Maintain a safe environment  3. Secure patient belongings  4. Ensure family/visitors adhere to safety recommendations  5. Ensure safety tray has been added to patient's diet order  6.   Every shift and PRN: Re-assess suicidal risk via Frequent Screener    4/19/2023 1436 by Mariela Amaral RN  Outcome: Adequate for Discharge  4/19/2023 1001 by Mariela Amaral RN  Outcome: Progressing     Problem: Discharge Planning  Goal: Discharge to home or other facility with appropriate resources  4/19/2023 1436 by Mariela Amaral RN  Outcome: Adequate for Discharge  4/19/2023 1001 by Mariela Amaral RN  Outcome: Progressing  Flowsheets (Taken 4/19/2023 0760)  Discharge to home or other facility with appropriate resources:   Identify barriers to discharge with patient and caregiver   Arrange for needed discharge resources and transportation as appropriate     Problem: Safety - Adult  Goal: Free from fall injury  4/19/2023 1436 by Mariela Amaral RN  Outcome: Adequate for Discharge  4/19/2023 1001 by Mariela Amaral RN  Outcome: Progressing     Problem: ABCDS Injury Assessment  Goal: Absence of physical injury  4/19/2023 1436 by Mariela Amaral RN  Outcome: Adequate for Discharge  4/19/2023 1001 by Mariela Amaral RN  Outcome: Progressing     Problem: Risk for Elopement  Goal: Patient will not exit the unit/facility without proper excort  4/19/2023 1436 by Mariela Amaral RN  Outcome: Adequate for Discharge  4/19/2023 1001 by Mariela Amaral RN  Outcome: Progressing  Flowsheets (Taken 4/19/2023 0951)  Nursing Interventions for Elopement Risk:   Shoes and clothing collected and placed in gown attire   Assist with personal care needs such as toileting, eating, dressing, as needed to reduce the risk of wandering

## 2023-04-19 NOTE — CONSULTS
patient  History of bipolar disorder, per patient  History of PTSD, per patient  Methamphetamine abuse    Recommendations  1. Currently patient is not suicidal or homicidal.  She is still positive for presence of paranoid ideations. Patient meets criteria for admission to psychiatric unit. 2.  Patient can be transferred to adult psychiatric unit for psychotropic medications management, when she is medically stable.       Noelle Abdi MD

## 2023-04-19 NOTE — DISCHARGE SUMMARY
K 4.1 4.1    109   CO2 24 23   BUN 11 11   CREATININE 0.6 0.6   GLUCOSE 140* 118*         Physical Exam:   Vital Signs: /67   Pulse 94   Temp 97 °F (36.1 °C) (Oral)   Resp 18   Ht 5' 6\" (1.676 m)   Wt 242 lb 5 oz (109.9 kg)   LMP 03/14/2023 (Approximate)   SpO2 96%   BMI 39.11 kg/m²   General appearance:. Alert and Cooperative   HEENT: Normocephalic. Chest: Lung sounds clear bilaterally without wheezes or rhonchi. Cardiac: RRR, S1, S2 normal. No murmurs, gallops, or rubs auscultated. Abdomen: soft, non-tender; non-distended normal bowel sounds no masses, no organomegaly. Extremities: No clubbing or cyanosis. No peripheral edema. Peripheral pulses palpable. Neurologic: Grossly intact. Discharge Medications:       Final discharge medication reconciliation to be completed upon discharge from psychiatric unit. Discharge Instructions: Follow up with PCP in 7 days. Take medications as directed. Resume activity as tolerated. No follow-up provider specified. Diet: ADULT DIET; Regular     Disposition: Patient is medically stable and will be discharged to inpatient adult psychiatric unit. Time spent on discharge 36 minutes spent in assessing patient, reviewing medications, discussion with nursing, confirming safe discharge plan and preparation of discharge summary. Signed:  Electronically signed by Lolly Leyden, APRN - CNP on 4/19/23 at 2:06 PM CDT         EMR Dragon/Transcription disclaimer:   Much of this encounter note is an electronic transcription/translation of spoken language to printed text.  The electronic translation of spoken language may permit erroneous, or at times, nonsensical words or phrases to be inadvertently transcribed; although attempts have made to review the note for such errors, some may still exist.

## 2023-04-19 NOTE — PLAN OF CARE
Problem: Self Harm/Suicidality  Goal: Will have no self-injury during hospital stay  Description: INTERVENTIONS:  1. Ensure constant observer at bedside with Q15M safety checks  2. Maintain a safe environment  3. Secure patient belongings  4. Ensure family/visitors adhere to safety recommendations  5. Ensure safety tray has been added to patient's diet order  6.   Every shift and PRN: Re-assess suicidal risk via Frequent Screener    4/19/2023 1529 by Collette Lares, RN  Outcome: Progressing  4/19/2023 1436 by Collette Lares, RN  Outcome: Adequate for Discharge  4/19/2023 1001 by Collette Lares, RN  Outcome: Progressing     Problem: Discharge Planning  Goal: Discharge to home or other facility with appropriate resources  4/19/2023 1529 by Collette Lares, RN  Outcome: Progressing  4/19/2023 1436 by Collette Lares, RN  Outcome: Adequate for Discharge  4/19/2023 1001 by Collette Lares, RN  Outcome: Progressing  Flowsheets (Taken 4/19/2023 4001)  Discharge to home or other facility with appropriate resources:   Identify barriers to discharge with patient and caregiver   Arrange for needed discharge resources and transportation as appropriate     Problem: Safety - Adult  Goal: Free from fall injury  4/19/2023 1529 by Collette Lares, RN  Outcome: Progressing  4/19/2023 1436 by Collette Lares, RN  Outcome: Adequate for Discharge  4/19/2023 1001 by Collette Lares, RN  Outcome: Progressing     Problem: ABCDS Injury Assessment  Goal: Absence of physical injury  4/19/2023 1529 by Collette Lares, RN  Outcome: Progressing  4/19/2023 1436 by Collette Lares, RN  Outcome: Adequate for Discharge  4/19/2023 1001 by Collette Lares, RN  Outcome: Progressing     Problem: Risk for Elopement  Goal: Patient will not exit the unit/facility without proper excort  4/19/2023 1529 by Collette Lares, RN  Outcome: Progressing  4/19/2023 1436 by Collette Lares, RN  Outcome: Adequate for Discharge  4/19/2023 1001 by Collette Lares, RN  Outcome:

## 2023-04-19 NOTE — PROGRESS NOTES
4 Eyes Skin Assessment    Zoe Whitt is being assessed upon: Admission    I agree that I, Elsy Shaw RN, along with Dominique Hernandez RN (either 2 RN's or 1 LPN and 1 RN) have performed a thorough Head to Toe Skin Assessment on the patient. ALL assessment sites listed below have been assessed. Areas assessed by both nurses:     [x]   Head, Face, and Ears   [x]   Shoulders, Back, and Chest  [x]   Arms, Elbows, and Hands   [x]   Coccyx, Sacrum, and Ischium  [x]   Legs, Feet, and Heels    Does the Patient have Skin Breakdown?  No    Jose Prevention initiated: Yes  Wound Care Orders initiated: NA    [facility] nurse consulted for Pressure Injury (Stage 3,4, Unstageable, DTI, NWPT, and Complex wounds) and New or Established Ostomies: NA        Primary Nurse eSignature: Elsy Shaw RN on 4/18/2023 at 7:20 PM      Co-Signer eSignature: {Esignature:[id number]}
Agustin Hartman arrived to room # 315. Presented with: poly substance abuse  Mental Status: Patient is oriented, alert, and able to concentrate and follow conversation. Vitals:    04/18/23 1345   BP: 115/70   Pulse: 82   Resp: 16   Temp: 97.9 °F (36.6 °C)   SpO2: 99%     Patient safety contract and falls prevention contract reviewed with patient Yes. Oriented Patient to room. Call light within reach. Yes.   Needs, issues or concerns expressed at this time: no.      Electronically signed by Brian Bravo RN on 4/18/2023 at 2:16 PM
LETICIA ADULT INITIAL INTAKE ASSESSMENT     4/18/23    Rajiv Beavers ,a 29 y.o. female, presents to the ED for a psychiatric assessment. ED Arrival time: ***  ED physician: quinn single:[id number]::\"Ponce PA\",\"Yan\",\"Grover\",\"Shelley\",\"[place]\",\"Ronnell\",\"Duley\",\"R. Drish APRN\",\"Fiessigger APRN\",\"T. Thai APRN\",\"Klope\",\"Manger\",\"Foreign\",\"Elbert\",\"Cavazos\",\"Poncher\",\"Santillan APRN\",\"Wells\",\"Trujillo\"}  LETICIA Notification time: ***  LETICIA Assessment start time: ***  Psychiatrist call time: ***  Spoke with Dr. Elsie Power single:[id number]::\"DuBosque\",\"Kuhn\",\"Christian\",\"Famina\",\"Schuhmann\"}    Patient is referred by: ***    Reason for visit to ED - Presenting problem:     PT states reason for ED visit, \"***    Duration of symptoms: ***    Current Stressors: { :02468}    C-SSRS Completed: {yes:561059::\"yes\"}  Risk Assessment Completed:{yes:270367::\"yes\"}  Risk of Suicide: High Risk  Provider notified of the C-SSRS Screening and Risk Assessment Findings:{yes:940440::\"yes\"}    SI:  {:5300}   Plan: {no/yes:166946::\"no\"}   Past SI attempts: {no/yes:721723::\"no\"}   If yes, when and how many times:  Describe suicide attempts:   HI: {:5300}  If yes describe:   Delusions: {:5300}  If yes describe:   Hallucinations: {:5300}   If yes describe:   Risk of Harm to self: Self injurious/self mutilation behaviors{no/yes:394317::\"no\"}   If yes explain:   Was it within the past 6 months: {no/yes:053164::\"no\"}   Risk of Harm to others: {no/yes:396736::\"no\"}   If yes explain:   Was it within the past 6 months: {no/yes:586678::\"no\"}   Trauma History:  Anxiety 1-10:  {:20828}  Explain if increased: ***  Depression 1-10:  {:20828}  Explain if increased: ***  Level of function outside hospital decreased: {no/yes:291818::\"no\"}   If yes explain: ***  Has patient been completing ADL's: {no/yes:895786::\"no\"}    Mental Status Evaluation:     Appearance:  {Exam; general psych:64658}   Behavior:  {Exam; behavior child:21354}   Speech:  {Findings; speech
1 TABLET BY MOUTH DAILY, Disp: , Rfl:     Zolpidem Tartrate (AMBIEN PO), Take by mouth, Disp: , Rfl:     ALPRAZolam (XANAX) 1 MG tablet, alprazolam 1 mg tablet, Disp: , Rfl:        Collateral Information:     Name: Delicia Crawley  Relationship:   Phone Number: [phone]  Collateral: Patient has not been taking her psychiatric medications that her psychiatrist prescribed to her, and she probably has not told him. Told aunt she does not like the way it feels. Aunt reports that patient moved in with her back in July of last year, and shortly after discovered she was using methamphetamines. Current living arrangement:aunt  Current Support System:aunt  Employment:none    Disposition:     Choose one of the options below for disposition:     1. Decision to admit to :no    If yes, which unit Adult or Geriatric Unit:  N/A  Is patient voluntary: no  If no has a 72 hold been initiated: In ED  Admission Diagnosis: N/A    Does the patient have a guardian or Medical POA: no  Has the guardian been notified or Medical POA: no      2. Decision to Discharge: no  Does not meet criteria for acceptance to   unit due to:     3. Transferred: To medical floor. Patient was transferred due to: numerous substance abuse.     Other follow up information provided:      Sarah Marcos RN

## 2023-04-20 PROBLEM — F29 PSYCHOSIS (HCC): Status: ACTIVE | Noted: 2023-04-20

## 2023-04-20 PROBLEM — F15.90 STIMULANT USE DISORDER: Status: ACTIVE | Noted: 2023-04-20

## 2023-04-20 PROBLEM — F17.200 TOBACCO USE DISORDER: Status: ACTIVE | Noted: 2023-04-20

## 2023-04-20 PROCEDURE — 6370000000 HC RX 637 (ALT 250 FOR IP): Performed by: NURSE PRACTITIONER

## 2023-04-20 PROCEDURE — 1240000000 HC EMOTIONAL WELLNESS R&B

## 2023-04-20 PROCEDURE — 6370000000 HC RX 637 (ALT 250 FOR IP): Performed by: PSYCHIATRY & NEUROLOGY

## 2023-04-20 RX ORDER — ALBUTEROL SULFATE 90 UG/1
2 AEROSOL, METERED RESPIRATORY (INHALATION) EVERY 6 HOURS PRN
Status: DISCONTINUED | OUTPATIENT
Start: 2023-04-20 | End: 2023-04-24 | Stop reason: HOSPADM

## 2023-04-20 RX ORDER — DOXAZOSIN MESYLATE 1 MG/1
1 TABLET ORAL NIGHTLY
Status: DISCONTINUED | OUTPATIENT
Start: 2023-04-20 | End: 2023-04-20

## 2023-04-20 RX ORDER — GABAPENTIN 600 MG/1
600 TABLET ORAL 2 TIMES DAILY
Status: DISCONTINUED | OUTPATIENT
Start: 2023-04-20 | End: 2023-04-21

## 2023-04-20 RX ORDER — POLYETHYLENE GLYCOL 3350 17 G/17G
17 POWDER, FOR SOLUTION ORAL DAILY PRN
Status: DISCONTINUED | OUTPATIENT
Start: 2023-04-20 | End: 2023-04-24 | Stop reason: HOSPADM

## 2023-04-20 RX ORDER — ACETAMINOPHEN 650 MG/1
650 SUPPOSITORY RECTAL EVERY 6 HOURS PRN
Status: DISCONTINUED | OUTPATIENT
Start: 2023-04-20 | End: 2023-04-20

## 2023-04-20 RX ORDER — ONDANSETRON 4 MG/1
4 TABLET, ORALLY DISINTEGRATING ORAL EVERY 8 HOURS PRN
Status: DISCONTINUED | OUTPATIENT
Start: 2023-04-20 | End: 2023-04-20

## 2023-04-20 RX ORDER — ACETAMINOPHEN 325 MG/1
650 TABLET ORAL EVERY 6 HOURS PRN
Status: DISCONTINUED | OUTPATIENT
Start: 2023-04-20 | End: 2023-04-20

## 2023-04-20 RX ORDER — ONDANSETRON 2 MG/ML
4 INJECTION INTRAMUSCULAR; INTRAVENOUS EVERY 6 HOURS PRN
Status: DISCONTINUED | OUTPATIENT
Start: 2023-04-20 | End: 2023-04-20

## 2023-04-20 RX ORDER — LANOLIN ALCOHOL/MO/W.PET/CERES
3 CREAM (GRAM) TOPICAL NIGHTLY
Status: DISCONTINUED | OUTPATIENT
Start: 2023-04-20 | End: 2023-04-24 | Stop reason: HOSPADM

## 2023-04-20 RX ORDER — LISINOPRIL 5 MG/1
5 TABLET ORAL DAILY
Status: DISCONTINUED | OUTPATIENT
Start: 2023-04-20 | End: 2023-04-24 | Stop reason: HOSPADM

## 2023-04-20 RX ORDER — DOXEPIN HYDROCHLORIDE 25 MG/1
25 CAPSULE ORAL NIGHTLY PRN
Status: DISCONTINUED | OUTPATIENT
Start: 2023-04-20 | End: 2023-04-24 | Stop reason: HOSPADM

## 2023-04-20 RX ORDER — RISPERIDONE 1 MG/1
2 TABLET ORAL NIGHTLY
Status: DISCONTINUED | OUTPATIENT
Start: 2023-04-20 | End: 2023-04-24

## 2023-04-20 RX ADMIN — DOXEPIN HYDROCHLORIDE 25 MG: 25 CAPSULE ORAL at 20:39

## 2023-04-20 RX ADMIN — GABAPENTIN 600 MG: 600 TABLET, FILM COATED ORAL at 20:37

## 2023-04-20 RX ADMIN — RISPERIDONE 2 MG: 1 TABLET ORAL at 20:37

## 2023-04-20 RX ADMIN — Medication 3 MG: at 20:38

## 2023-04-20 RX ADMIN — GABAPENTIN 600 MG: 600 TABLET, FILM COATED ORAL at 14:41

## 2023-04-20 RX ADMIN — LISINOPRIL 5 MG: 5 TABLET ORAL at 14:41

## 2023-04-21 LAB
25(OH)D3 SERPL-MCNC: 16.9 NG/ML
HBA1C MFR BLD: 6.5 % (ref 4–6)
TSH SERPL DL<=0.005 MIU/L-ACNC: 1.18 UIU/ML (ref 0.35–5.5)
VIT B12 SERPL-MCNC: 267 PG/ML (ref 211–946)

## 2023-04-21 PROCEDURE — 82607 VITAMIN B-12: CPT

## 2023-04-21 PROCEDURE — 36415 COLL VENOUS BLD VENIPUNCTURE: CPT

## 2023-04-21 PROCEDURE — 6370000000 HC RX 637 (ALT 250 FOR IP): Performed by: PSYCHIATRY & NEUROLOGY

## 2023-04-21 PROCEDURE — 84443 ASSAY THYROID STIM HORMONE: CPT

## 2023-04-21 PROCEDURE — 6370000000 HC RX 637 (ALT 250 FOR IP): Performed by: NURSE PRACTITIONER

## 2023-04-21 PROCEDURE — 1240000000 HC EMOTIONAL WELLNESS R&B

## 2023-04-21 PROCEDURE — 82306 VITAMIN D 25 HYDROXY: CPT

## 2023-04-21 PROCEDURE — 6370000000 HC RX 637 (ALT 250 FOR IP): Performed by: FAMILY MEDICINE

## 2023-04-21 PROCEDURE — 83036 HEMOGLOBIN GLYCOSYLATED A1C: CPT

## 2023-04-21 RX ORDER — DOXAZOSIN MESYLATE 1 MG/1
1 TABLET ORAL NIGHTLY
Status: DISCONTINUED | OUTPATIENT
Start: 2023-04-21 | End: 2023-04-24 | Stop reason: HOSPADM

## 2023-04-21 RX ORDER — GABAPENTIN 600 MG/1
600 TABLET ORAL 3 TIMES DAILY
Status: DISCONTINUED | OUTPATIENT
Start: 2023-04-21 | End: 2023-04-24 | Stop reason: HOSPADM

## 2023-04-21 RX ORDER — CHOLECALCIFEROL (VITAMIN D3) 125 MCG
500 CAPSULE ORAL DAILY
Status: DISCONTINUED | OUTPATIENT
Start: 2023-04-21 | End: 2023-04-24 | Stop reason: HOSPADM

## 2023-04-21 RX ORDER — ERGOCALCIFEROL 1.25 MG/1
50000 CAPSULE ORAL WEEKLY
Status: DISCONTINUED | OUTPATIENT
Start: 2023-04-21 | End: 2023-04-24 | Stop reason: HOSPADM

## 2023-04-21 RX ADMIN — GABAPENTIN 600 MG: 600 TABLET, FILM COATED ORAL at 20:53

## 2023-04-21 RX ADMIN — RISPERIDONE 2 MG: 1 TABLET ORAL at 20:53

## 2023-04-21 RX ADMIN — LISINOPRIL 5 MG: 5 TABLET ORAL at 08:31

## 2023-04-21 RX ADMIN — Medication 3 MG: at 20:53

## 2023-04-21 RX ADMIN — ERGOCALCIFEROL 50000 UNITS: 1.25 CAPSULE ORAL at 14:19

## 2023-04-21 RX ADMIN — DOXAZOSIN 1 MG: 1 TABLET ORAL at 20:53

## 2023-04-21 RX ADMIN — GABAPENTIN 600 MG: 600 TABLET, FILM COATED ORAL at 08:29

## 2023-04-21 RX ADMIN — CYANOCOBALAMIN TAB 500 MCG 500 MCG: 500 TAB at 14:19

## 2023-04-21 RX ADMIN — GABAPENTIN 600 MG: 600 TABLET, FILM COATED ORAL at 14:19

## 2023-04-21 RX ADMIN — DOXEPIN HYDROCHLORIDE 25 MG: 25 CAPSULE ORAL at 20:53

## 2023-04-22 PROCEDURE — 6370000000 HC RX 637 (ALT 250 FOR IP): Performed by: NURSE PRACTITIONER

## 2023-04-22 PROCEDURE — 6370000000 HC RX 637 (ALT 250 FOR IP): Performed by: PSYCHIATRY & NEUROLOGY

## 2023-04-22 PROCEDURE — 6370000000 HC RX 637 (ALT 250 FOR IP): Performed by: FAMILY MEDICINE

## 2023-04-22 PROCEDURE — 1240000000 HC EMOTIONAL WELLNESS R&B

## 2023-04-22 RX ADMIN — Medication 3 MG: at 21:12

## 2023-04-22 RX ADMIN — GABAPENTIN 600 MG: 600 TABLET, FILM COATED ORAL at 21:12

## 2023-04-22 RX ADMIN — RISPERIDONE 2 MG: 1 TABLET ORAL at 21:13

## 2023-04-22 RX ADMIN — LISINOPRIL 5 MG: 5 TABLET ORAL at 08:37

## 2023-04-22 RX ADMIN — DOXAZOSIN 1 MG: 1 TABLET ORAL at 21:16

## 2023-04-22 RX ADMIN — DOXEPIN HYDROCHLORIDE 25 MG: 25 CAPSULE ORAL at 21:12

## 2023-04-22 RX ADMIN — POLYETHYLENE GLYCOL 3350 17 G: 17 POWDER, FOR SOLUTION ORAL at 16:57

## 2023-04-22 RX ADMIN — ACETAMINOPHEN 650 MG: 325 TABLET ORAL at 16:17

## 2023-04-22 RX ADMIN — GABAPENTIN 600 MG: 600 TABLET, FILM COATED ORAL at 08:37

## 2023-04-22 RX ADMIN — CYANOCOBALAMIN TAB 500 MCG 500 MCG: 500 TAB at 08:37

## 2023-04-22 RX ADMIN — ACETAMINOPHEN 650 MG: 325 TABLET ORAL at 21:11

## 2023-04-22 RX ADMIN — GABAPENTIN 600 MG: 600 TABLET, FILM COATED ORAL at 13:55

## 2023-04-22 ASSESSMENT — PAIN DESCRIPTION - ONSET: ONSET: GRADUAL

## 2023-04-22 ASSESSMENT — PAIN DESCRIPTION - LOCATION
LOCATION: SHOULDER

## 2023-04-22 ASSESSMENT — PAIN DESCRIPTION - FREQUENCY: FREQUENCY: INTERMITTENT

## 2023-04-22 ASSESSMENT — PAIN DESCRIPTION - ORIENTATION
ORIENTATION: RIGHT

## 2023-04-22 ASSESSMENT — PAIN DESCRIPTION - PAIN TYPE: TYPE: ACUTE PAIN

## 2023-04-22 ASSESSMENT — PAIN SCALES - GENERAL
PAINLEVEL_OUTOF10: 0
PAINLEVEL_OUTOF10: 4
PAINLEVEL_OUTOF10: 8
PAINLEVEL_OUTOF10: 7

## 2023-04-22 ASSESSMENT — PAIN DESCRIPTION - DESCRIPTORS
DESCRIPTORS: ACHING;DISCOMFORT
DESCRIPTORS: ACHING
DESCRIPTORS: ACHING

## 2023-04-22 ASSESSMENT — PAIN - FUNCTIONAL ASSESSMENT
PAIN_FUNCTIONAL_ASSESSMENT: ACTIVITIES ARE NOT PREVENTED

## 2023-04-23 PROCEDURE — 1240000000 HC EMOTIONAL WELLNESS R&B

## 2023-04-23 PROCEDURE — 6370000000 HC RX 637 (ALT 250 FOR IP): Performed by: NURSE PRACTITIONER

## 2023-04-23 PROCEDURE — 6370000000 HC RX 637 (ALT 250 FOR IP): Performed by: FAMILY MEDICINE

## 2023-04-23 PROCEDURE — 6370000000 HC RX 637 (ALT 250 FOR IP): Performed by: PSYCHIATRY & NEUROLOGY

## 2023-04-23 RX ADMIN — ACETAMINOPHEN 650 MG: 325 TABLET ORAL at 13:36

## 2023-04-23 RX ADMIN — GABAPENTIN 600 MG: 600 TABLET, FILM COATED ORAL at 07:43

## 2023-04-23 RX ADMIN — GABAPENTIN 600 MG: 600 TABLET, FILM COATED ORAL at 13:35

## 2023-04-23 RX ADMIN — RISPERIDONE 2 MG: 1 TABLET ORAL at 21:07

## 2023-04-23 RX ADMIN — DOXEPIN HYDROCHLORIDE 25 MG: 25 CAPSULE ORAL at 21:07

## 2023-04-23 RX ADMIN — LISINOPRIL 5 MG: 5 TABLET ORAL at 07:47

## 2023-04-23 RX ADMIN — Medication 3 MG: at 21:07

## 2023-04-23 RX ADMIN — GABAPENTIN 600 MG: 600 TABLET, FILM COATED ORAL at 21:07

## 2023-04-23 RX ADMIN — CYANOCOBALAMIN TAB 500 MCG 500 MCG: 500 TAB at 07:44

## 2023-04-23 RX ADMIN — DOXAZOSIN 1 MG: 1 TABLET ORAL at 21:07

## 2023-04-23 RX ADMIN — ACETAMINOPHEN 650 MG: 325 TABLET ORAL at 19:23

## 2023-04-23 ASSESSMENT — PAIN DESCRIPTION - LOCATION
LOCATION: SHOULDER;ARM
LOCATION: SHOULDER

## 2023-04-23 ASSESSMENT — PAIN DESCRIPTION - DESCRIPTORS
DESCRIPTORS: ACHING;DISCOMFORT
DESCRIPTORS: ACHING

## 2023-04-23 ASSESSMENT — PAIN - FUNCTIONAL ASSESSMENT
PAIN_FUNCTIONAL_ASSESSMENT: ACTIVITIES ARE NOT PREVENTED
PAIN_FUNCTIONAL_ASSESSMENT: ACTIVITIES ARE NOT PREVENTED

## 2023-04-23 ASSESSMENT — PAIN DESCRIPTION - ORIENTATION
ORIENTATION: RIGHT
ORIENTATION: RIGHT

## 2023-04-23 ASSESSMENT — PAIN SCALES - GENERAL
PAINLEVEL_OUTOF10: 0
PAINLEVEL_OUTOF10: 5
PAINLEVEL_OUTOF10: 8

## 2023-04-23 ASSESSMENT — PAIN SCALES - WONG BAKER: WONGBAKER_NUMERICALRESPONSE: 0

## 2023-04-24 VITALS
HEART RATE: 120 BPM | OXYGEN SATURATION: 95 % | RESPIRATION RATE: 18 BRPM | TEMPERATURE: 96.8 F | SYSTOLIC BLOOD PRESSURE: 128 MMHG | DIASTOLIC BLOOD PRESSURE: 77 MMHG

## 2023-04-24 LAB
CHOLEST SERPL-MCNC: 127 MG/DL (ref 160–199)
HDLC SERPL-MCNC: 31 MG/DL (ref 65–121)
LDLC SERPL CALC-MCNC: 70 MG/DL
TRIGL SERPL-MCNC: 128 MG/DL (ref 0–149)

## 2023-04-24 PROCEDURE — 6370000000 HC RX 637 (ALT 250 FOR IP): Performed by: PSYCHIATRY & NEUROLOGY

## 2023-04-24 PROCEDURE — 5130000000 HC BRIDGE APPOINTMENT

## 2023-04-24 PROCEDURE — 6370000000 HC RX 637 (ALT 250 FOR IP): Performed by: NURSE PRACTITIONER

## 2023-04-24 PROCEDURE — 6370000000 HC RX 637 (ALT 250 FOR IP): Performed by: FAMILY MEDICINE

## 2023-04-24 PROCEDURE — 36415 COLL VENOUS BLD VENIPUNCTURE: CPT

## 2023-04-24 PROCEDURE — 80061 LIPID PANEL: CPT

## 2023-04-24 RX ORDER — RISPERIDONE 1 MG/1
1 TABLET ORAL NIGHTLY
Qty: 30 TABLET | Refills: 0 | Status: SHIPPED | OUTPATIENT
Start: 2023-04-24

## 2023-04-24 RX ORDER — ERGOCALCIFEROL 1.25 MG/1
50000 CAPSULE ORAL WEEKLY
Qty: 5 CAPSULE | Refills: 0 | Status: SHIPPED | OUTPATIENT
Start: 2023-04-28 | End: 2023-07-08

## 2023-04-24 RX ORDER — DOXEPIN HYDROCHLORIDE 25 MG/1
25 CAPSULE ORAL NIGHTLY PRN
Qty: 30 CAPSULE | Refills: 3 | Status: SHIPPED | OUTPATIENT
Start: 2023-04-24

## 2023-04-24 RX ORDER — RISPERIDONE 1 MG/1
1 TABLET ORAL NIGHTLY
Status: DISCONTINUED | OUTPATIENT
Start: 2023-04-24 | End: 2023-04-24 | Stop reason: HOSPADM

## 2023-04-24 RX ADMIN — GABAPENTIN 600 MG: 600 TABLET, FILM COATED ORAL at 08:42

## 2023-04-24 RX ADMIN — LISINOPRIL 5 MG: 5 TABLET ORAL at 08:42

## 2023-04-24 RX ADMIN — GABAPENTIN 600 MG: 600 TABLET, FILM COATED ORAL at 14:48

## 2023-04-24 RX ADMIN — CYANOCOBALAMIN TAB 500 MCG 500 MCG: 500 TAB at 08:42

## 2023-04-24 RX ADMIN — ACETAMINOPHEN 650 MG: 325 TABLET ORAL at 15:30

## 2023-04-24 SDOH — ECONOMIC STABILITY: TRANSPORTATION INSECURITY
IN THE PAST 12 MONTHS, HAS THE LACK OF TRANSPORTATION KEPT YOU FROM MEDICAL APPOINTMENTS OR FROM GETTING MEDICATIONS?: YES

## 2023-04-24 SDOH — ECONOMIC STABILITY: HOUSING INSECURITY
IN THE LAST 12 MONTHS, WAS THERE A TIME WHEN YOU DID NOT HAVE A STEADY PLACE TO SLEEP OR SLEPT IN A SHELTER (INCLUDING NOW)?: NO

## 2023-04-24 SDOH — ECONOMIC STABILITY: TRANSPORTATION INSECURITY
IN THE PAST 12 MONTHS, HAS LACK OF TRANSPORTATION KEPT YOU FROM MEETINGS, WORK, OR FROM GETTING THINGS NEEDED FOR DAILY LIVING?: YES

## 2023-04-24 SDOH — HEALTH STABILITY: PHYSICAL HEALTH: ON AVERAGE, HOW MANY MINUTES DO YOU ENGAGE IN EXERCISE AT THIS LEVEL?: 30 MIN

## 2023-04-24 SDOH — ECONOMIC STABILITY: INCOME INSECURITY: IN THE LAST 12 MONTHS, WAS THERE A TIME WHEN YOU WERE NOT ABLE TO PAY THE MORTGAGE OR RENT ON TIME?: NO

## 2023-04-24 SDOH — HEALTH STABILITY: PHYSICAL HEALTH: ON AVERAGE, HOW MANY DAYS PER WEEK DO YOU ENGAGE IN MODERATE TO STRENUOUS EXERCISE (LIKE A BRISK WALK)?: 2 DAYS

## 2023-04-24 SDOH — ECONOMIC STABILITY: FOOD INSECURITY: WITHIN THE PAST 12 MONTHS, YOU WORRIED THAT YOUR FOOD WOULD RUN OUT BEFORE YOU GOT MONEY TO BUY MORE.: NEVER TRUE

## 2023-04-24 SDOH — ECONOMIC STABILITY: FOOD INSECURITY: WITHIN THE PAST 12 MONTHS, THE FOOD YOU BOUGHT JUST DIDN'T LAST AND YOU DIDN'T HAVE MONEY TO GET MORE.: NEVER TRUE

## 2023-04-24 ASSESSMENT — PATIENT HEALTH QUESTIONNAIRE - PHQ9
SUM OF ALL RESPONSES TO PHQ9 QUESTIONS 1 & 2: 1
1. LITTLE INTEREST OR PLEASURE IN DOING THINGS: SEVERAL DAYS
SUM OF ALL RESPONSES TO PHQ QUESTIONS 1-9: 1
SUM OF ALL RESPONSES TO PHQ QUESTIONS 1-9: 1
2. FEELING DOWN, DEPRESSED OR HOPELESS: SEVERAL DAYS
SUM OF ALL RESPONSES TO PHQ9 QUESTIONS 1 & 2: 2
10. IF YOU CHECKED OFF ANY PROBLEMS, HOW DIFFICULT HAVE THESE PROBLEMS MADE IT FOR YOU TO DO YOUR WORK, TAKE CARE OF THINGS AT HOME, OR GET ALONG WITH OTHER PEOPLE: SOMEWHAT DIFFICULT
2. FEELING DOWN, DEPRESSED OR HOPELESS: SEVERAL DAYS
2. FEELING DOWN, DEPRESSED OR HOPELESS: SEVERAL DAYS
SUM OF ALL RESPONSES TO PHQ9 QUESTIONS 1 & 2: 2
1. LITTLE INTEREST OR PLEASURE IN DOING THINGS: SEVERAL DAYS
SUM OF ALL RESPONSES TO PHQ QUESTIONS 1-9: 1
SUM OF ALL RESPONSES TO PHQ9 QUESTIONS 1 & 2: 2
10. IF YOU CHECKED OFF ANY PROBLEMS, HOW DIFFICULT HAVE THESE PROBLEMS MADE IT FOR YOU TO DO YOUR WORK, TAKE CARE OF THINGS AT HOME, OR GET ALONG WITH OTHER PEOPLE: SOMEWHAT DIFFICULT
1. LITTLE INTEREST OR PLEASURE IN DOING THINGS: 0
SUM OF ALL RESPONSES TO PHQ QUESTIONS 1-9: 1
2. FEELING DOWN, DEPRESSED OR HOPELESS: 1
2. FEELING DOWN, DEPRESSED OR HOPELESS: SEVERAL DAYS
SUM OF ALL RESPONSES TO PHQ9 QUESTIONS 1 & 2: 2
1. LITTLE INTEREST OR PLEASURE IN DOING THINGS: SEVERAL DAYS
1. LITTLE INTEREST OR PLEASURE IN DOING THINGS: SEVERAL DAYS
10. IF YOU CHECKED OFF ANY PROBLEMS, HOW DIFFICULT HAVE THESE PROBLEMS MADE IT FOR YOU TO DO YOUR WORK, TAKE CARE OF THINGS AT HOME, OR GET ALONG WITH OTHER PEOPLE: SOMEWHAT DIFFICULT

## 2023-04-24 ASSESSMENT — SOCIAL DETERMINANTS OF HEALTH (SDOH)
WITHIN THE LAST YEAR, HAVE YOU BEEN HUMILIATED OR EMOTIONALLY ABUSED IN OTHER WAYS BY YOUR PARTNER OR EX-PARTNER?: NO
IN A TYPICAL WEEK, HOW MANY TIMES DO YOU TALK ON THE PHONE WITH FAMILY, FRIENDS, OR NEIGHBORS?: THREE TIMES A WEEK
HOW OFTEN DO YOU ATTEND CHURCH OR RELIGIOUS SERVICES?: NEVER
WITHIN THE LAST YEAR, HAVE YOU BEEN AFRAID OF YOUR PARTNER OR EX-PARTNER?: NO
HOW OFTEN DO YOU ATTENT MEETINGS OF THE CLUB OR ORGANIZATION YOU BELONG TO?: NEVER
ARE YOU MARRIED, WIDOWED, DIVORCED, SEPARATED, NEVER MARRIED, OR LIVING WITH A PARTNER?: NEVER MARRIED
WITHIN THE LAST YEAR, HAVE TO BEEN RAPED OR FORCED TO HAVE ANY KIND OF SEXUAL ACTIVITY BY YOUR PARTNER OR EX-PARTNER?: NO
WITHIN THE LAST YEAR, HAVE YOU BEEN KICKED, HIT, SLAPPED, OR OTHERWISE PHYSICALLY HURT BY YOUR PARTNER OR EX-PARTNER?: NO
HOW HARD IS IT FOR YOU TO PAY FOR THE VERY BASICS LIKE FOOD, HOUSING, MEDICAL CARE, AND HEATING?: SOMEWHAT HARD
HOW OFTEN DO YOU GET TOGETHER WITH FRIENDS OR RELATIVES?: THREE TIMES A WEEK
DO YOU BELONG TO ANY CLUBS OR ORGANIZATIONS SUCH AS CHURCH GROUPS UNIONS, FRATERNAL OR ATHLETIC GROUPS, OR SCHOOL GROUPS?: NO

## 2023-04-24 ASSESSMENT — PAIN - FUNCTIONAL ASSESSMENT: PAIN_FUNCTIONAL_ASSESSMENT: ACTIVITIES ARE NOT PREVENTED

## 2023-04-24 ASSESSMENT — PAIN SCALES - GENERAL: PAINLEVEL_OUTOF10: 5

## 2023-04-24 ASSESSMENT — PAIN DESCRIPTION - DESCRIPTORS: DESCRIPTORS: OTHER (COMMENT)

## 2023-04-24 ASSESSMENT — PAIN DESCRIPTION - ORIENTATION: ORIENTATION: OTHER (COMMENT)

## 2023-04-24 ASSESSMENT — PAIN DESCRIPTION - LOCATION: LOCATION: OTHER (COMMENT)

## 2023-04-24 NOTE — PLAN OF CARE
Problem: Risk for Elopement  Goal: Patient will not exit the unit/facility without proper excort  Outcome: Progressing     Problem: Coping  Goal: Pt/Family able to verbalize concerns and demonstrate effective coping strategies  Outcome: Progressing     Problem: Nutrition Deficit:  Goal: Optimize nutritional status  Outcome: Progressing     Problem: Pain  Goal: Verbalizes/displays adequate comfort level or baseline comfort level  Outcome: Progressing

## 2023-04-24 NOTE — PROGRESS NOTES
Progress Note  Corazon Rolo  4/24/2023 5:33 PM  Subjective:   Admit Date:   4/19/2023      CC/ADMIT DX:       Interval History:   Reviewed overnight events and nursing notes. She denies any new physical complaints. I have reviewed all labs/diagnostics from the last 24hrs. ROS:   I have done a 10 point ROS and all are negative, except what is mentioned in the HPI. ADULT DIET; Regular; 4 carb choices (60 gm/meal)    Medications:      risperiDONE  1 mg Oral Nightly    vitamin D  50,000 Units Oral Weekly    vitamin B-12  500 mcg Oral Daily    gabapentin  600 mg Oral TID    doxazosin  1 mg Oral Nightly    melatonin  3 mg Oral Nightly    lisinopril  5 mg Oral Daily    nicotine  1 patch TransDERmal Daily           Objective:   Vitals: /77   Pulse (!) 120 Comment: provider notified  Temp 96.8 °F (36 °C)   Resp 18   LMP 03/14/2023 (Approximate)   SpO2 95%  No intake or output data in the 24 hours ending 04/24/23 1733  General appearance: alert and cooperative with exam  Lungs: clear to auscultation bilaterally  Heart: regular rate and rhythm, S1, S2 normal, no murmur, click, rub or gallop  Abdomen: soft, non-tender; bowel sounds normal; no masses,  no organomegaly  Extremities: extremities normal, atraumatic, no cyanosis or edema  Neurologic:  No obvious focal neurologic deficits. Assessment and Plan:   Principal Problem:    Psychosis ([address]  Active Problems:    Paranoia ([address]    Stimulant use disorder    Tobacco use disorder  Resolved Problems:    * No resolved hospital problems. *    DM2    Vit D Def    HTN    DM2    Plan:   Continue present medication(s)    Follow with Psych   Follow with BP      Discharge planning:   her home     Reviewed treatment plans with the patient and/or family.              Electronically signed by Mt Devine MD on 4/24/2023 at 5:33 PM

## 2023-04-24 NOTE — DISCHARGE SUMMARY
needed         STOP taking these medications       norelgestromin-ethinyl estradiol Herlinda Minks) 150-35 MCG/24HR Comments:   Reason for Stopping: pt reports no longer prescribed this medication        cariprazine hcl (VRAYLAR) 3 MG CAPS capsule Comments:   Reason for Stopping: pt reports no longer prescribed this medication        Dulaglutide (TRULICITY) 4.17 UR/2.6TN SOPN Comments:   Reason for Stopping: pt reports no longer prescribed this medication        naproxen (NAPROSYN) 500 MG tablet Comments:   Reason for Stopping: pt reports no longer prescribed this medication        Semaglutide,0.25 or 0.5MG/DOS, (OZEMPIC, 0.25 OR 0.5 MG/DOSE,) 2 MG/1.5ML SOPN Comments:   Reason for Stopping: pt reports no longer prescribed this medication        traMADol (ULTRAM) 50 MG tablet Comments:   Reason for Stopping: pt reports no longer prescribed this medication        cloNIDine (CATAPRES) 0.2 MG tablet Comments:   Reason for Stopping: pt reports no longer prescribed this medication        cloNIDine (CATAPRES) 0.1 MG tablet Comments:   Reason for Stopping: pt reports no longer prescribed this medication        ALPRAZolam (XANAX) 1 MG tablet Comments:   Reason for Stopping: pt reports no longer prescribed this medication        Lisdexamfetamine Dimesylate (VYVANSE) 60 MG CAPS Comments:   Reason for Stopping: pt reports no longer prescribed this medication        Zolpidem Tartrate (AMBIEN PO) Comments:   Reason for Stopping: pt reports no benefit from this medication                     Activity: activity as tolerated  Diet: regular diet  Wound Care: none needed    Follow-up with   PCP in 2 weeks.     Radha Elkins on 5/1/2023 at 9 am     Time worked: Less than 30 minutes    Participation:good    Electronically signed by Jarvis Sauer, PMADRIANP-BC, FNP-C on 4/24/2023 at 1:27 PM

## 2023-04-24 NOTE — PROGRESS NOTES
Behavioral Health   Discharge Note  Bridge Appointment completed: Reviewed Discharge Instructions with patient. Patient verbalizes understanding and agreement with the discharge plan using the teachback method. Referral for Outpatient Tobacco Cessation Counseling, upon discharge (flaco X if applicable and completed):    ( )  Hospital staff assisted patient to call Quit Line or faxed referral                                   during hospitalization                  ( )  Recognizing danger situations (included triggers and roadblocks), if not completed on admission                    ( )  Coping skills (new ways to manage stress, exercise, relaxation techniques, changing routine, distraction), if not completed on admission                                                           ( )  Basic information about quitting (benefits of quitting, techniques in how to quit, available resources, if not completed on admission  ( ) Referral for counseling faxed to [facility]   ( x) Patient refused referral  (x ) Patient refused counseling  (x ) Patient refused smoking cessation medication upon discharge    Vaccinations (flaco X if applicable and completed):  ( ) Patient states already received influenza vaccine elsewhere  ( ) Patient received influenza vaccine during this hospitalization  (x ) Patient refused influenza vaccine at this time      Pt discharged with followings belongings:       Valuables sent home with PATIENT. Valuables retrieved from [facility] and returned to patient. Patient left department with family member   via car  , discharged to home  . Patient education on aftercare instructions: yes  Patient verbalize understanding of AVS:  yes. Suicidal Ideations? No Risk of Suicide: No Risk AVH? Denies/absent HI? Negative for homicidal ideation           AVS/Transition Record has been discussed with patient and a copy was given to the patient.   The AVS/Transition Record was faxed to the next level of

## 2023-04-24 NOTE — PROGRESS NOTES
Group Therapy Note    Date: 4/24/2023  Start Time: 0800  End Time:  0830  Number of Participants: 8    Type of Group: [employer] Information  Module Name:  Self-Inventory  Session Number:  1    Patient's Goal:  Feeling better    Status After Intervention:  Unchanged    Participation Level: Active Listener    Participation Quality: Appropriate      Speech:  normal      Thought Process/Content: Logical      Affective Functioning: Congruent      Mood: calm      Level of consciousness:  Alert and Oriented x4      Response to Learning: Able to verbalize current knowledge/experience      Endings: None Reported    Modes of Intervention: Education and Support      Discipline Responsible: Registered Nurse      Signature:   Ellen Gautam RN
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
## 2023-04-24 NOTE — PLAN OF CARE
Group Therapy Note    Date: 4/24/2023  Start Time: 1100  End Time:  1595  Number of Participants: 4    Type of Group: Psychotherapy    Wellness Binder Information  Module Name:  emotional wellness  Session Number:  1    Patient's Goal:  validation of feelings    Notes:  pt was verbally prompted to attend group. Pt refused. Information about emotional wellness was provided. Status After Intervention:      Participation Level:     Participation Quality:       Speech:         Thought Process/Content:       Affective Functioning:       Mood:       Level of consciousness:        Response to Learning:       Endings:     Modes of Intervention:       Discipline Responsible: Psychoeducational Specialist      Signature:  Kedar Shetty

## 2023-04-24 NOTE — PROGRESS NOTES
Discharge Note     Patient is discharging on this date. Patient denies SI, HI, and AVH at this time. Patient reports improvement in behavior and is leaving unit in overall good condition. SW and patient discussed patient's follow up appointments and importance of attending appointments as scheduled, patient voiced understanding and agreement. Patient and SW also discussed patient's safety plan and patient was able to verbally identify: warning signs, coping strategies, places and people that help make the patient feel better/distract negative thoughts, friends/family/agencies/professionals the patient can reach out to in a crisis, and something that is important to the patient/worth living for. Patient was provided the national suicide prevention hotline number (2-585-469-962-094-9991) as well as local community behavioral health [facility]) crisis number for emergencies (2-077-070-1566). Discharge Disposition: home -lives with family      Pt to follow up with:  Elías Alex  on May  2 , 2023 at 1:00 PM for the intake appointment.  Referral to outpatient tobacco cessation counseling treatment:  Patient refused referral to outpatient tobacco cessation counseling    SW offered to assist patient with transportation, patient declined transportation assistance

## 2023-04-24 NOTE — PROGRESS NOTES
[facility] Adult Unit Daily Assessment  Nursing Progress Note    Room: [facility]-   Name: Shankar Hallman   Age: 29 y.o. Gender: female   Dx: Psychosis ([address]  Precautions: suicide risk  Inpatient Status: voluntary       SLEEP:  Sleep Quality Good  Sleep Medications: Yes Melatonin 3mg   PRN Sleep Meds: Yes sinequan 25 mg      MEDICAL:  Other PRN Meds: No   Med Compliant: Yes  Accu-Chek: No  Oxygen/CPAP/BiPAP: No  CIWA/CINA: No   PAIN Assessment: present - adequately treated  Side Effects from medication: No      Metabolic Screening:  Lab Results   Component Value Date    LABA1C 6.5 (H) 04/21/2023     No results found for: CHOL  No results found for: TRIG  No results found for: HDL  No components found for: LDLCAL  No results found for: LABVLDL  There is no height or weight on file to calculate BMI. BP Readings from Last 2 Encounters:   04/23/23 125/87   04/19/23 106/67         Medical Bed:   Is patient in a medical bed? no   If medical bed is in use, has nursing secured room while patient is awake and out of the room? NA  Has safety checks by nursing been completed on the bed/room this shift? yes    Protective Factors:  Patient identifies protective factors with nursing staff as follows: Identifies reasons for living: Yes   Supportive Social Network or family: Yes    Belief that suicide is immoral/high spirituality: Yes   Responsibility to family or others/living with family: Yes   Fear of death or dying due to pain and suffering: Yes   Engaged in work or school: Yes     If Patient is unable to identify, reason why? PSYCH:  Depression: 0   Anxiety: 6   SI denies suicidal ideation   Risk of Suicide: No Risk  HI Negative for homicidal ideation      AVH:no If Hallucinations are present, describe?          GENERAL:  Appetite: good   Percent Meals: 75%   Social: Yes   Speech: hesitant   Appearance: appropriately dressed and healthy looking    GROUP:  Group Participation: Yes  Participation Quality: Minimal    Notes: Pt
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
## 2023-04-24 NOTE — PROGRESS NOTES
Social Determinants of Health     Tobacco Use: High Risk    Smoking Tobacco Use: Every Day    Smokeless Tobacco Use: Never    Passive Exposure: Not on file   Alcohol Use: Not At Risk    Frequency of Alcohol Consumption: Never    Average Number of Drinks: Patient does not drink    Frequency of Binge Drinking: Never   Financial Resource Strain: Medium Risk    Difficulty of Paying Living Expenses: Somewhat hard   Food Insecurity: No Food Insecurity    Worried About Running Out of Food in the Last Year: Never true    Ran Out of Food in the Last Year: Never true   Transportation Needs: Unmet Transportation Needs    Lack of Transportation (Medical): Yes    Lack of Transportation (Non-Medical): Yes   Physical Activity: Insufficiently Active    Days of Exercise per Week: 2 days    Minutes of Exercise per Session: 30 min   Stress: Stress Concern Present    Feeling of Stress : To some extent   Social Connections: Socially Isolated    Frequency of Communication with Friends and Family: Three times a week    Frequency of Social Gatherings with Friends and Family:  Three times a week    Attends Hindu Services: Never    Active Member of Clubs or Organizations: No    Attends Club or Organization Meetings: Never    Marital Status: Never    Intimate Partner Violence: Not At Risk    Fear of Current or Ex-Partner: No    Emotionally Abused: No    Physically Abused: No    Sexually Abused: No   Depression: Not on file   Housing Stability: Unknown    Unable to Pay for Housing in the Last Year: No    Number of Places Lived in the Last Year: Not on file    Unstable Housing in the Last Year: No       History    Tobacco Use      Smoking status: Every Day        Types: Cigarettes      Smokeless tobacco: Never    E-cigarette/Vaping       Questions Responses    E-cigarette/Vaping Use     Start Date     Passive Exposure     Quit Date     Counseling Given     Comments             Social History     Substance and Sexual Activity   Drug

## 2023-04-24 NOTE — PROGRESS NOTES
Group Note    Date: 04/23/23  Start Time: 7:30 PM   End Time:8:00 PM     Number of Participants: 7    Type of Group: Wrap-Up     Patient's Goal:  none listed    Notes:  none listed    Status After Intervention:  Unchanged    Participation Level: Minimal    Participation Quality: Resistant    Speech:  normal    Thought Process/Content: Linear    Mood: anxious, depressed, and fearful    Level of consciousness:  Alert, Oriented x4, and Preoccupied    Response to Learning: Progressing to goal    Modes of Intervention: Education and Support    Discipline Responsible: Registered Nurse     Signature:  Mahad Bliss RN

## 2023-04-24 NOTE — PLAN OF CARE
Problem: Coping  Goal: Pt/Family able to verbalize concerns and demonstrate effective coping strategies  Description: INTERVENTIONS:  1. Assist patient/family to identify coping skills, available support systems and cultural and spiritual values  2. Provide emotional support, including active listening and acknowledgement of concerns of patient and caregivers  3. Reduce environmental stimuli, as able  4. Instruct patient/family in relaxation techniques, as appropriate  5. Assess for spiritual pain/suffering and initiate Spiritual Care, Psychosocial Clinical Specialist consults as needed  4/24/2023 1018 by Jean-Paul Phillip  Outcome: Progressing  Note:                                                                     Group Therapy Note    Date: 4/24/2023  Start Time: 0930  End Time:  1000  Number of Participants: 7    Type of Group: Psychoeducation    Wellness Binder Information  Module Name:  Relapse Prevention  Session Number:  5    Group Goal for Pt: To improve knowledge of relapse prevention strategies    Notes:  Pt demonstrated improved knowledge of relapse prevention strategies by actively participating in group discussion. Status After Intervention:  Unchanged    Participation Level:  Active Listener and Interactive    Participation Quality: Appropriate and Attentive      Speech:  normal      Thought Process/Content: Logical      Affective Functioning: Congruent      Mood: anxious and depressed      Level of consciousness:  Alert and Oriented x4      Response to Learning: Able to verbalize current knowledge/experience, Able to verbalize/acknowledge new learning, and Progressing to goal      Endings: None Reported    Modes of Intervention: Education      Discipline Responsible: Psychoeducational Specialist      Signature:  Jean-Paul Phillip

## 2023-04-24 NOTE — PLAN OF CARE
Problem: Risk for Elopement  Goal: Patient will not exit the unit/facility without proper excort  4/24/2023 0846 by Paola Atkins RN  Outcome: Adequate for Discharge  4/24/2023 0808 by Paola Atkins RN  Outcome: Progressing     Problem: Coping  Goal: Pt/Family able to verbalize concerns and demonstrate effective coping strategies  4/24/2023 0846 by Paola Atkins RN  Outcome: Adequate for Discharge  4/24/2023 0808 by Paola Atkins RN  Outcome: Progressing     Problem: Nutrition Deficit:  Goal: Optimize nutritional status  4/24/2023 0846 by Paola Atkins RN  Outcome: Adequate for Discharge  4/24/2023 0808 by Paola Atkins RN  Outcome: Progressing     Problem: Pain  Goal: Verbalizes/displays adequate comfort level or baseline comfort level  4/24/2023 0846 by Paola Atkins RN  Outcome: Adequate for Discharge  4/24/2023 0808 by Paola Atkins RN  Outcome: Progressing

## 2023-04-25 ENCOUNTER — TRANSITIONAL CARE MANAGEMENT TELEPHONE ENCOUNTER (OUTPATIENT)
Dept: CALL CENTER | Facility: HOSPITAL | Age: 33
End: 2023-04-25
Payer: COMMERCIAL

## 2023-04-25 ENCOUNTER — READMISSION MANAGEMENT (OUTPATIENT)
Dept: CALL CENTER | Facility: HOSPITAL | Age: 33
End: 2023-04-25
Payer: COMMERCIAL

## 2023-04-25 NOTE — PROGRESS NOTES
Social Determinants of Health          Tobacco Use: High Risk    Smoking Tobacco Use: Every Day    Smokeless Tobacco Use: Never    Passive Exposure: PAST   Alcohol Use: Not At Risk    Frequency of Alcohol Consumption: Never    Average Number of Drinks: Patient does not drink    Frequency of Binge Drinking: Never   Financial Resource Strain: Medium Risk    Difficulty of Paying Living Expenses: Somewhat hard   Food Insecurity: No Food Insecurity    Worried About Running Out of Food in the Last Year: Never true    Ran Out of Food in the Last Year: Never true   Transportation Needs: Unmet Transportation Needs    Lack of Transportation (Medical): Yes    Lack of Transportation (Non-Medical): Yes   Physical Activity: Insufficiently Active    Days of Exercise per Week: 2 days    Minutes of Exercise per Session: 30 min   Stress: Stress Concern Present    Feeling of Stress : To some extent   Social Connections: Socially Isolated    Frequency of Communication with Friends and Family: Three times a week    Frequency of Social Gatherings with Friends and Family:  Three times a week    Attends Judaism Services: Never    Active Member of Clubs or Organizations: No    Attends Club or Organization Meetings: Never    Marital Status: Never    Intimate Partner Violence: Not At Risk    Fear of Current or Ex-Partner: No    Emotionally Abused: No    Physically Abused: No    Sexually Abused: No   Depression: Low Risk   Housing Stability: Low Risk    Unable to Pay for Housing in the Last Year: No    Number of Places Lived in the Last Year: 1    Unstable Housing in the Last Year: No         History    Tobacco Use      Smoking status: Every Day        Types: Cigarettes      Smokeless tobacco: Never     E-cigarette/Vaping         Questions Responses     E-cigarette/Vaping Use       Start Date       Passive Exposure       Quit Date       Counseling Given       Comments                Social History          Substance and Sexual Activity

## 2023-04-25 NOTE — OUTREACH NOTE
Call Center TCM Note    Flowsheet Row Responses   [facility] patient discharged from? Non-   Does the patient have one of the following disease processes/diagnoses(primary or secondary)? Other   TCM attempt successful? Yes   Call start time 1445   Call end time 1449   Discharge diagnosis Delusional disorders   Person spoke with today (if not patient) and relationship pt   Meds reviewed with patient/caregiver? Yes   Is the patient having any side effects they believe may be caused by any medication additions or changes? No   Does the patient have all medications ordered at discharge? Yes   Is the patient taking all medications as directed (includes completed medication regime)? Yes   Does the patient have an appointment with their PCP within 7 days of discharge? Yes  [4/26/2023  4:00 PM]   Psychosocial issues? No   Did the patient receive a copy of their discharge instructions? Yes   Nursing interventions Reviewed instructions with patient   What is the patient's perception of their health status since discharge? Improving   Is the patient/caregiver able to teach back signs and symptoms related to disease process for when to call PCP? Yes   Is the patient/caregiver able to teach back signs and symptoms related to disease process for when to call 911? Yes   Is the patient/caregiver able to teach back the hierarchy of who to call/visit for symptoms/problems? PCP, Specialist, Home health nurse, Urgent Care, ED, 911 Yes   TCM call completed? Yes   Wrap up additional comments Pt states she is doing ok, and many medications changed at [facility]. Pt verified PCP [facility] fu appt on 4/26/23.   Call end time 1449   Would this patient benefit from a Referral to [facility] Social Work? No   Is the patient interested in additional calls from an ambulatory ?  NOTE:  applies to high risk patients requiring additional follow-up. No          Cherelle Smith RN    4/25/2023, 14:50 CDT

## 2023-04-25 NOTE — OUTREACH NOTE
Prep Survey    Flowsheet Row Responses   [facility] facility patient discharged from? Non-BH   Is LACE score < 7 ? Non-BH Discharge   Eligibility [facility]   Date of Discharge 04/24/23   Discharge Disposition Home or Self Care   Discharge diagnosis Delusional disorders   Does the patient have one of the following disease processes/diagnoses(primary or secondary)? Other   Prep survey completed? Yes          Yari GUZMAN - Registered Nurse

## 2023-04-26 SDOH — ECONOMIC STABILITY: HOUSING INSECURITY: IN THE LAST 12 MONTHS, HOW MANY PLACES HAVE YOU LIVED?: 1

## 2023-04-27 ENCOUNTER — HOSPITAL ENCOUNTER (INPATIENT)
Age: 33
LOS: 1 days | Discharge: HOME | DRG: 885 | End: 2023-04-28
Attending: PSYCHIATRY & NEUROLOGY | Admitting: PSYCHIATRY & NEUROLOGY
Payer: MEDICAID

## 2023-04-27 DIAGNOSIS — F22 DELUSIONS (HCC): ICD-10-CM

## 2023-04-27 DIAGNOSIS — F29 PSYCHOSIS, UNSPECIFIED PSYCHOSIS TYPE (HCC): Primary | ICD-10-CM

## 2023-04-27 LAB
ALBUMIN SERPL-MCNC: 4.5 G/DL (ref 3.5–5.2)
ALP SERPL-CCNC: 80 U/L (ref 35–104)
ALT SERPL-CCNC: 25 U/L (ref 5–33)
AMPHET UR QL SCN: POSITIVE
ANION GAP SERPL CALCULATED.3IONS-SCNC: 14 MMOL/L (ref 7–19)
AST SERPL-CCNC: 32 U/L (ref 5–32)
BACTERIA URNS QL MICRO: NEGATIVE /HPF
BARBITURATES UR QL SCN: NEGATIVE
BASOPHILS # BLD: 0 K/UL (ref 0–0.2)
BASOPHILS NFR BLD: 0.2 % (ref 0–1)
BENZODIAZ UR QL SCN: POSITIVE
BILIRUB SERPL-MCNC: 0.3 MG/DL (ref 0.2–1.2)
BILIRUB UR QL STRIP: NEGATIVE
BUN SERPL-MCNC: 7 MG/DL (ref 6–20)
BUPRENORPHINE URINE: NEGATIVE
CALCIUM SERPL-MCNC: 9.5 MG/DL (ref 8.6–10)
CANNABINOIDS UR QL SCN: POSITIVE
CHLORIDE SERPL-SCNC: 105 MMOL/L (ref 98–111)
CLARITY UR: CLEAR
CO2 SERPL-SCNC: 22 MMOL/L (ref 22–29)
COCAINE UR QL SCN: NEGATIVE
COLOR UR: YELLOW
CREAT SERPL-MCNC: 0.5 MG/DL (ref 0.5–0.9)
CRYSTALS URNS MICRO: ABNORMAL /HPF
DRUG SCREEN COMMENT UR-IMP: ABNORMAL
EOSINOPHIL # BLD: 0 K/UL (ref 0–0.6)
EOSINOPHIL NFR BLD: 0.5 % (ref 0–5)
EPI CELLS #/AREA URNS AUTO: 0 /HPF (ref 0–5)
ERYTHROCYTE [DISTWIDTH] IN BLOOD BY AUTOMATED COUNT: 13.2 % (ref 11.5–14.5)
ETHANOLAMINE SERPL-MCNC: <10 MG/DL (ref 0–0.08)
GLUCOSE SERPL-MCNC: 118 MG/DL (ref 74–109)
GLUCOSE UR STRIP.AUTO-MCNC: NEGATIVE MG/DL
HCG UR QL: NEGATIVE
HCT VFR BLD AUTO: 42.4 % (ref 37–47)
HGB BLD-MCNC: 13.9 G/DL (ref 12–16)
HGB UR STRIP.AUTO-MCNC: ABNORMAL MG/L
HYALINE CASTS #/AREA URNS AUTO: 0 /HPF (ref 0–8)
IMM GRANULOCYTES # BLD: 0 K/UL
KETONES UR STRIP.AUTO-MCNC: NEGATIVE MG/DL
LEUKOCYTE ESTERASE UR QL STRIP.AUTO: NEGATIVE
LYMPHOCYTES # BLD: 2.3 K/UL (ref 1.1–4.5)
LYMPHOCYTES NFR BLD: 28.6 % (ref 20–40)
MCH RBC QN AUTO: 30.2 PG (ref 27–31)
MCHC RBC AUTO-ENTMCNC: 32.8 G/DL (ref 33–37)
MCV RBC AUTO: 92.2 FL (ref 81–99)
METHADONE UR QL SCN: NEGATIVE
METHAMPHETAMINE, URINE: POSITIVE
MONOCYTES # BLD: 0.4 K/UL (ref 0–0.9)
MONOCYTES NFR BLD: 5.1 % (ref 0–10)
NEUTROPHILS # BLD: 5.3 K/UL (ref 1.5–7.5)
NEUTS SEG NFR BLD: 65.2 % (ref 50–65)
NITRITE UR QL STRIP.AUTO: NEGATIVE
OPIATES UR QL SCN: NEGATIVE
OXYCODONE UR QL SCN: NEGATIVE
PCP UR QL SCN: NEGATIVE
PH UR STRIP.AUTO: 6.5 [PH] (ref 5–8)
PLATELET # BLD AUTO: 273 K/UL (ref 130–400)
PMV BLD AUTO: 11.1 FL (ref 9.4–12.3)
POTASSIUM SERPL-SCNC: 4.2 MMOL/L (ref 3.5–5)
PROPOXYPH UR QL SCN: NEGATIVE
PROT SERPL-MCNC: 7.5 G/DL (ref 6.6–8.7)
PROT UR STRIP.AUTO-MCNC: NEGATIVE MG/DL
RBC # BLD AUTO: 4.6 M/UL (ref 4.2–5.4)
RBC #/AREA URNS AUTO: 38 /HPF (ref 0–4)
SODIUM SERPL-SCNC: 141 MMOL/L (ref 136–145)
SP GR UR STRIP.AUTO: 1.01 (ref 1–1.03)
TRICYCLIC, URINE: NEGATIVE
UROBILINOGEN UR STRIP.AUTO-MCNC: 0.2 E.U./DL
WBC # BLD AUTO: 8.1 K/UL (ref 4.8–10.8)
WBC #/AREA URNS AUTO: 1 /HPF (ref 0–5)

## 2023-04-27 PROCEDURE — 85025 COMPLETE CBC W/AUTO DIFF WBC: CPT

## 2023-04-27 PROCEDURE — 36415 COLL VENOUS BLD VENIPUNCTURE: CPT

## 2023-04-27 PROCEDURE — 99284 EMERGENCY DEPT VISIT MOD MDM: CPT

## 2023-04-27 PROCEDURE — 96372 THER/PROPH/DIAG INJ SC/IM: CPT

## 2023-04-27 PROCEDURE — 84703 CHORIONIC GONADOTROPIN ASSAY: CPT

## 2023-04-27 PROCEDURE — 6360000002 HC RX W HCPCS: Performed by: PHYSICIAN ASSISTANT

## 2023-04-27 PROCEDURE — 82077 ASSAY SPEC XCP UR&BREATH IA: CPT

## 2023-04-27 PROCEDURE — 80053 COMPREHEN METABOLIC PANEL: CPT

## 2023-04-27 PROCEDURE — 81001 URINALYSIS AUTO W/SCOPE: CPT

## 2023-04-27 PROCEDURE — 80306 DRUG TEST PRSMV INSTRMNT: CPT

## 2023-04-27 PROCEDURE — 1240000000 HC EMOTIONAL WELLNESS R&B

## 2023-04-27 RX ORDER — LORAZEPAM 2 MG/ML
2 INJECTION INTRAMUSCULAR ONCE
Status: COMPLETED | OUTPATIENT
Start: 2023-04-27 | End: 2023-04-27

## 2023-04-27 RX ORDER — HALOPERIDOL 5 MG/ML
5 INJECTION INTRAMUSCULAR ONCE
Status: COMPLETED | OUTPATIENT
Start: 2023-04-27 | End: 2023-04-27

## 2023-04-27 RX ORDER — ACETAMINOPHEN 325 MG/1
650 TABLET ORAL EVERY 4 HOURS PRN
Status: DISCONTINUED | OUTPATIENT
Start: 2023-04-27 | End: 2023-04-28 | Stop reason: HOSPADM

## 2023-04-27 RX ORDER — NICOTINE 21 MG/24HR
1 PATCH, TRANSDERMAL 24 HOURS TRANSDERMAL DAILY
Status: DISCONTINUED | OUTPATIENT
Start: 2023-04-28 | End: 2023-04-28 | Stop reason: HOSPADM

## 2023-04-27 RX ORDER — DIAZEPAM 10 MG/1
10 TABLET ORAL EVERY 6 HOURS PRN
Status: ON HOLD | COMMUNITY
End: 2023-04-28 | Stop reason: HOSPADM

## 2023-04-27 RX ORDER — POLYETHYLENE GLYCOL 3350 17 G/17G
17 POWDER, FOR SOLUTION ORAL DAILY PRN
Status: DISCONTINUED | OUTPATIENT
Start: 2023-04-27 | End: 2023-04-28 | Stop reason: HOSPADM

## 2023-04-27 RX ADMIN — LORAZEPAM 2 MG: 2 INJECTION INTRAMUSCULAR; INTRAVENOUS at 21:42

## 2023-04-27 RX ADMIN — HALOPERIDOL LACTATE 5 MG: 5 INJECTION, SOLUTION INTRAMUSCULAR at 21:42

## 2023-04-27 ASSESSMENT — SLEEP AND FATIGUE QUESTIONNAIRES
SLEEP PATTERN: DIFFICULTY FALLING ASLEEP;DISTURBED/INTERRUPTED SLEEP
DO YOU USE A SLEEP AID: NO
DO YOU HAVE DIFFICULTY SLEEPING: YES

## 2023-04-27 ASSESSMENT — PAIN - FUNCTIONAL ASSESSMENT: PAIN_FUNCTIONAL_ASSESSMENT: NONE - DENIES PAIN

## 2023-04-27 ASSESSMENT — PATIENT HEALTH QUESTIONNAIRE - PHQ9: SUM OF ALL RESPONSES TO PHQ QUESTIONS 1-9: 4

## 2023-04-27 ASSESSMENT — ENCOUNTER SYMPTOMS
ABDOMINAL PAIN: 0
SHORTNESS OF BREATH: 0

## 2023-04-28 VITALS
HEART RATE: 91 BPM | SYSTOLIC BLOOD PRESSURE: 142 MMHG | OXYGEN SATURATION: 93 % | DIASTOLIC BLOOD PRESSURE: 98 MMHG | TEMPERATURE: 97.5 F | WEIGHT: 239 LBS | HEIGHT: 66 IN | BODY MASS INDEX: 38.41 KG/M2 | RESPIRATION RATE: 16 BRPM

## 2023-04-28 PROCEDURE — 5130000000 HC BRIDGE APPOINTMENT

## 2023-04-28 PROCEDURE — 6370000000 HC RX 637 (ALT 250 FOR IP): Performed by: PSYCHIATRY & NEUROLOGY

## 2023-04-28 RX ORDER — DOXAZOSIN MESYLATE 1 MG/1
1 TABLET ORAL NIGHTLY
Status: DISCONTINUED | OUTPATIENT
Start: 2023-04-28 | End: 2023-04-28 | Stop reason: HOSPADM

## 2023-04-28 RX ORDER — GABAPENTIN 300 MG/1
600 CAPSULE ORAL 2 TIMES DAILY
Status: DISCONTINUED | OUTPATIENT
Start: 2023-04-28 | End: 2023-04-28 | Stop reason: HOSPADM

## 2023-04-28 RX ORDER — ERGOCALCIFEROL 1.25 MG/1
50000 CAPSULE ORAL WEEKLY
Status: DISCONTINUED | OUTPATIENT
Start: 2023-04-28 | End: 2023-04-28 | Stop reason: HOSPADM

## 2023-04-28 RX ORDER — RISPERIDONE 1 MG/1
1 TABLET ORAL NIGHTLY
Status: DISCONTINUED | OUTPATIENT
Start: 2023-04-28 | End: 2023-04-28 | Stop reason: HOSPADM

## 2023-04-28 RX ORDER — LISINOPRIL 5 MG/1
5 TABLET ORAL DAILY
Status: DISCONTINUED | OUTPATIENT
Start: 2023-04-28 | End: 2023-04-28 | Stop reason: HOSPADM

## 2023-04-28 RX ORDER — ALBUTEROL SULFATE 90 UG/1
2 AEROSOL, METERED RESPIRATORY (INHALATION) EVERY 6 HOURS PRN
Status: DISCONTINUED | OUTPATIENT
Start: 2023-04-28 | End: 2023-04-28 | Stop reason: HOSPADM

## 2023-04-28 RX ORDER — DOXEPIN HYDROCHLORIDE 25 MG/1
25 CAPSULE ORAL NIGHTLY PRN
Status: DISCONTINUED | OUTPATIENT
Start: 2023-04-28 | End: 2023-04-28 | Stop reason: HOSPADM

## 2023-04-28 RX ORDER — CHOLECALCIFEROL (VITAMIN D3) 125 MCG
500 CAPSULE ORAL DAILY
Status: DISCONTINUED | OUTPATIENT
Start: 2023-04-28 | End: 2023-04-28 | Stop reason: HOSPADM

## 2023-04-28 RX ADMIN — LISINOPRIL 5 MG: 5 TABLET ORAL at 11:33

## 2023-04-28 RX ADMIN — GABAPENTIN 600 MG: 300 CAPSULE ORAL at 11:32

## 2023-04-28 RX ADMIN — ERGOCALCIFEROL 50000 UNITS: 1.25 CAPSULE ORAL at 11:33

## 2023-04-28 RX ADMIN — CYANOCOBALAMIN TAB 500 MCG 500 MCG: 500 TAB at 11:33

## 2023-04-28 SDOH — HEALTH STABILITY: PHYSICAL HEALTH: ON AVERAGE, HOW MANY DAYS PER WEEK DO YOU ENGAGE IN MODERATE TO STRENUOUS EXERCISE (LIKE A BRISK WALK)?: 2 DAYS

## 2023-04-28 SDOH — ECONOMIC STABILITY: HOUSING INSECURITY: IN THE LAST 12 MONTHS, HOW MANY PLACES HAVE YOU LIVED?: 1

## 2023-04-28 SDOH — ECONOMIC STABILITY: INCOME INSECURITY: IN THE LAST 12 MONTHS, WAS THERE A TIME WHEN YOU WERE NOT ABLE TO PAY THE MORTGAGE OR RENT ON TIME?: NO

## 2023-04-28 SDOH — ECONOMIC STABILITY: FOOD INSECURITY: WITHIN THE PAST 12 MONTHS, THE FOOD YOU BOUGHT JUST DIDN'T LAST AND YOU DIDN'T HAVE MONEY TO GET MORE.: NEVER TRUE

## 2023-04-28 SDOH — ECONOMIC STABILITY: FOOD INSECURITY: WITHIN THE PAST 12 MONTHS, YOU WORRIED THAT YOUR FOOD WOULD RUN OUT BEFORE YOU GOT MONEY TO BUY MORE.: NEVER TRUE

## 2023-04-28 SDOH — HEALTH STABILITY: PHYSICAL HEALTH: ON AVERAGE, HOW MANY MINUTES DO YOU ENGAGE IN EXERCISE AT THIS LEVEL?: 30 MIN

## 2023-04-28 ASSESSMENT — PATIENT HEALTH QUESTIONNAIRE - PHQ9
SUM OF ALL RESPONSES TO PHQ QUESTIONS 1-9: 2
1. LITTLE INTEREST OR PLEASURE IN DOING THINGS: 1
SUM OF ALL RESPONSES TO PHQ9 QUESTIONS 1 & 2: 2
2. FEELING DOWN, DEPRESSED OR HOPELESS: 1
SUM OF ALL RESPONSES TO PHQ9 QUESTIONS 1 & 2: 2
10. IF YOU CHECKED OFF ANY PROBLEMS, HOW DIFFICULT HAVE THESE PROBLEMS MADE IT FOR YOU TO DO YOUR WORK, TAKE CARE OF THINGS AT HOME, OR GET ALONG WITH OTHER PEOPLE: SOMEWHAT DIFFICULT
SUM OF ALL RESPONSES TO PHQ QUESTIONS 1-9: 2
SUM OF ALL RESPONSES TO PHQ QUESTIONS 1-9: 2
2. FEELING DOWN, DEPRESSED OR HOPELESS: SEVERAL DAYS
SUM OF ALL RESPONSES TO PHQ QUESTIONS 1-9: 2
1. LITTLE INTEREST OR PLEASURE IN DOING THINGS: SEVERAL DAYS

## 2023-04-28 ASSESSMENT — SOCIAL DETERMINANTS OF HEALTH (SDOH)
WITHIN THE LAST YEAR, HAVE TO BEEN RAPED OR FORCED TO HAVE ANY KIND OF SEXUAL ACTIVITY BY YOUR PARTNER OR EX-PARTNER?: NO
IN A TYPICAL WEEK, HOW MANY TIMES DO YOU TALK ON THE PHONE WITH FAMILY, FRIENDS, OR NEIGHBORS?: THREE TIMES A WEEK
ARE YOU MARRIED, WIDOWED, DIVORCED, SEPARATED, NEVER MARRIED, OR LIVING WITH A PARTNER?: NEVER MARRIED
HOW OFTEN DO YOU ATTENT MEETINGS OF THE CLUB OR ORGANIZATION YOU BELONG TO?: NEVER
HOW OFTEN DO YOU GET TOGETHER WITH FRIENDS OR RELATIVES?: THREE TIMES A WEEK
HOW OFTEN DO YOU ATTEND CHURCH OR RELIGIOUS SERVICES?: NEVER
WITHIN THE LAST YEAR, HAVE YOU BEEN AFRAID OF YOUR PARTNER OR EX-PARTNER?: NO
DO YOU BELONG TO ANY CLUBS OR ORGANIZATIONS SUCH AS CHURCH GROUPS UNIONS, FRATERNAL OR ATHLETIC GROUPS, OR SCHOOL GROUPS?: NO
WITHIN THE LAST YEAR, HAVE YOU BEEN KICKED, HIT, SLAPPED, OR OTHERWISE PHYSICALLY HURT BY YOUR PARTNER OR EX-PARTNER?: NO
HOW HARD IS IT FOR YOU TO PAY FOR THE VERY BASICS LIKE FOOD, HOUSING, MEDICAL CARE, AND HEATING?: SOMEWHAT HARD
WITHIN THE LAST YEAR, HAVE YOU BEEN HUMILIATED OR EMOTIONALLY ABUSED IN OTHER WAYS BY YOUR PARTNER OR EX-PARTNER?: NO
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
## 2023-04-28 NOTE — DISCHARGE INSTRUCTIONS
Medications:   Medication Summary Provided. I understand that I should take only the medications on my list.   --why and when I need to take each medication. --which side effects to watch for.   --that I should carry my medication information at all times in case of emergency situations. --I will take all medications until discontinued by physician. I will take all my medications to follow up appointments. --check with my physician or pharmacist before taking any new medication, over the counter product or drink alcohol.   --ask about food, drug and dietary supplement interactions. --discard old lists and update records with medication providers. Behavior Health Follow Up:  Original Referral Source: ED  Discharge Diagnosis: [unfilled]  Recommendations for Level of Care: Follow Up  Patient Status at Discharge: Stable  [facility]  was: Aftercare plan faxed:    Faxed by: Social Work staff Gerda   Date: 23   Time:  will fax   Prescriptions sent with pt.     General Information:   Questions regarding your bill: Call [phone]   Suicide Hotline ([facility] Crisis) 1-168.976.2761   To obtain results of pending studies call Medical Records at: [phone]   For emergencies call: 911 24 hour/7 days a week contact information: [phone]

## 2023-04-28 NOTE — PLAN OF CARE
Problem: Anxiety  Goal: Will report anxiety at manageable levels  4/28/2023 1140 by Rahel Keita  Outcome: Progressing                                                                       Group Therapy Note    Date: 4/28/2023  Start Time: 1100  End Time:  8072  Number of Participants: 15    Type of Group: Psychoeducation    Wellness Binder Information  Module Name:  emotional wellness  Session Number:  1    Patient's Goal:  validation of feelings    Notes:  pt acknowledged to have feelings validated it may be necessary to share feelings with others.     Status After Intervention:  Improved    Participation Level: Interactive    Participation Quality: Appropriate, Attentive, and Sharing      Speech:  normal      Thought Process/Content: Logical      Affective Functioning: Congruent      Mood: congruent      Level of consciousness:  Alert, Oriented x4, and Attentive      Response to Learning: Able to verbalize current knowledge/experience      Endings: None Reported    Modes of Intervention: Education      Discipline Responsible: Psychoeducational Specialist      Signature:  Rahel Keita

## 2023-04-28 NOTE — DISCHARGE INSTR - DIET
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
## 2023-04-28 NOTE — PLAN OF CARE
Problem: Risk for Elopement  Goal: Patient will not exit the unit/facility without proper excort  Outcome: Adequate for Discharge     Problem: Safety - Adult  Goal: Free from fall injury  Outcome: Adequate for Discharge     Problem: ABCDS Injury Assessment  Goal: Absence of physical injury  Outcome: Adequate for Discharge     Problem: Nita  Goal: Will exhibit normal sleep and speech and no impulsivity  Description: INTERVENTIONS:  1. Administer medication as ordered  2. Set limits on impulsive behavior  3. Make attempts to decrease external stimuli as possible  Outcome: Adequate for Discharge     Problem: Psychosis  Goal: Will report no hallucinations or delusions  Description: INTERVENTIONS:  1. Administer medication as  ordered  2. Assist with reality testing to support increasing orientation  3. Assess if patient's hallucinations or delusions are encouraging self harm or harm to others and intervene as appropriate  Outcome: Adequate for Discharge     Problem: Behavior  Goal: Pt/Family maintain appropriate behavior and adhere to behavioral management agreement, if implemented  Description: INTERVENTIONS:  1. Assess patient/family's coping skills and  non-compliant behavior (including use of illegal substances)  2. Notify security of behavior or suspected illegal substances which indicate the need for search of the family and/or belongings  3. Encourage verbalization of thoughts and concerns in a socially appropriate manner  4. Utilize positive, consistent limit setting strategies supporting safety of patient, staff and others  5. Encourage participation in the decision making process about the behavioral management agreement  6. If a visitor's behavior poses a threat to safety call refer to organization policy.   7. Initiate consult with , Psychosocial CNS, Spiritual Care as appropriate  Outcome: Adequate for Discharge     Problem: Anxiety  Goal: Will report anxiety at manageable

## 2023-04-28 NOTE — PLAN OF CARE
Problem: Risk for Elopement  Goal: Patient will not exit the unit/facility without proper excort  4/28/2023 1117 by Arnetha Cabot, RN  Outcome: Adequate for Discharge  4/28/2023 1057 by Arnetha Cabot, RN  Outcome: Adequate for Discharge     Problem: Safety - Adult  Goal: Free from fall injury  4/28/2023 1117 by Arnetha Cabot, RN  Outcome: Adequate for Discharge  4/28/2023 1057 by Arnetha Cabot, RN  Outcome: Adequate for Discharge     Problem: ABCDS Injury Assessment  Goal: Absence of physical injury  4/28/2023 1117 by Arnetha Cabot, RN  Outcome: Adequate for Discharge  4/28/2023 1057 by Arnetha Cabot, RN  Outcome: Adequate for Discharge     Problem: Nita  Goal: Will exhibit normal sleep and speech and no impulsivity  Description: INTERVENTIONS:  1. Administer medication as ordered  2. Set limits on impulsive behavior  3. Make attempts to decrease external stimuli as possible  4/28/2023 1117 by Arnetha Cabot, RN  Outcome: Adequate for Discharge  4/28/2023 1057 by Arnetha Cabot, RN  Outcome: Adequate for Discharge     Problem: Psychosis  Goal: Will report no hallucinations or delusions  Description: INTERVENTIONS:  1. Administer medication as  ordered  2. Assist with reality testing to support increasing orientation  3. Assess if patient's hallucinations or delusions are encouraging self harm or harm to others and intervene as appropriate  4/28/2023 1117 by Arnetha Cabot, RN  Outcome: Adequate for Discharge  4/28/2023 1057 by Arnetha Cabot, RN  Outcome: Adequate for Discharge     Problem: Behavior  Goal: Pt/Family maintain appropriate behavior and adhere to behavioral management agreement, if implemented  Description: INTERVENTIONS:  1. Assess patient/family's coping skills and  non-compliant behavior (including use of illegal substances)  2. Notify security of behavior or suspected illegal substances which indicate the need for search of the family and/or belongings  3.  Encourage verbalization
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
## 2023-04-28 NOTE — ED PROVIDER NOTES
(Non-Medical): Yes   Physical Activity: Insufficiently Active    Days of Exercise per Week: 2 days    Minutes of Exercise per Session: 30 min   Stress: Stress Concern Present    Feeling of Stress : To some extent   Social Connections: Socially Isolated    Frequency of Communication with Friends and Family: Three times a week    Frequency of Social Gatherings with Friends and Family: Three times a week    Attends Holiness Services: Never    Active Member of Clubs or Organizations: No    Attends Club or Organization Meetings: Never    Marital Status: Never    Intimate Partner Violence: Not At Risk    Fear of Current or Ex-Partner: No    Emotionally Abused: No    Physically Abused: No    Sexually Abused: No   Housing Stability: Low Risk     Unable to Pay for Housing in the Last Year: No    Number of [place] in the Last Year: 1    Unstable Housing in the Last Year: No       SCREENINGS    Ashley Coma Scale  Eye Opening: Spontaneous  Best Verbal Response: Oriented  Best Motor Response: Obeys commands  Ashley Coma Scale Score: 15        PHYSICAL EXAM    (up to 7 for level 4, 8 or more for level 5)     ED Triage Vitals [04/27/23 1828]   BP Temp Temp Source Heart Rate Resp SpO2 Height Weight   (!) 150/62 98.2 °F (36.8 °C) Oral 83 18 96 % -- 253 lb (114.8 kg)       Physical Exam  Vitals and nursing note reviewed. Constitutional:       General: She is not in acute distress. Appearance: Normal appearance. She is obese. She is not ill-appearing, toxic-appearing or diaphoretic. HENT:      Head: Normocephalic and atraumatic. Mouth/Throat:      Mouth: Mucous membranes are moist.   Cardiovascular:      Rate and Rhythm: Normal rate and regular rhythm. Pulses: Normal pulses. Pulmonary:      Effort: Pulmonary effort is normal. No respiratory distress. Musculoskeletal:      Cervical back: Normal range of motion and neck supple. Skin:     General: Skin is warm and dry.    Neurological:      General:

## 2023-04-29 ENCOUNTER — READMISSION MANAGEMENT (OUTPATIENT)
Dept: CALL CENTER | Facility: HOSPITAL | Age: 33
End: 2023-04-29
Payer: COMMERCIAL

## 2023-04-29 LAB
QT INTERVAL: 384 MS
QTC INTERVAL: 437 MS

## 2023-04-29 NOTE — FLOWSHEET NOTE
SW attempted to contact pt to follow-up with her after she discharged from the unit yesterday to see if she had any questions or concerns that needed to be addressed. SW called the contact number listed for the pt and spoke with her. Pt said she was doing well so far and denies having any questions or concerns at this time.

## 2023-04-29 NOTE — H&P
HISTORY and PHYSICAL      CHIEF COMPLAINT:  Psychosis    Reason for Admission:  Psychosis    History Obtained From:  patient, chart    HISTORY OF PRESENT ILLNESS:      The patient is a 29 y.o. female who is admitted to the [place] [address] unit with worsening mood issues. She has no new physical complaints. No CP or SOA. She has no GI complaints. No dysuria. No HA or dizziness. No fever. Past Medical History:        Diagnosis Date    ADHD     Hypertension      Past Surgical History:    History reviewed. No pertinent surgical history. Medications Prior to Admission:    No medications prior to admission. Allergies:  Hydroxyzine; Antihistamines, diphenhydramine-type; and Hydroxyquinolines    Social History:   TOBACCO:   reports that she has been smoking cigarettes. She has been exposed to tobacco smoke. She has never used smokeless tobacco.  ETOH:   reports that she does not currently use alcohol. DRUGS:   reports that she does not currently use drugs. MARITAL STATUS:  single  OCCUPATION:  filed for disability      Family History:   History reviewed. No pertinent family history. REVIEW OF SYSTEMS:  Constitutional: neg  CV: neg  Pulmonary: neg  GI: neg  : neg  Psych: psychosis  Neuro: neg  Skin: neg  MusculoSkeletal: neg  HEENT: neg  Joints: neg    Vitals:  BP (!) 142/98   Pulse 91   Temp 97.5 °F (36.4 °C)   Resp 16   Ht 5' 6\" (1.676 m)   Wt 239 lb (108.4 kg)   LMP 04/27/2023 (Exact Date)   SpO2 93%   BMI 38.58 kg/m²     PHYSICAL EXAM:  Gen: NAD, alert  HEENT: WNL  Lymph: no LAD  Neck: no JVD or masses  Chest: CTA bilat  CV: RRR  Abdomen: NT/ND  Extrem: no C/C/E  Neuro: non focal  Skin: no rashes  Joints: no redness    DATA:  I have reviewed the admission labs and imaging tests.     ASSESSMENT AND PLAN:      Principal Problem:    Psychosis---follow with Psych    Polysubstance Abuse    HTN--monitor BP          Fouzia Panda MD  11:21 PM 4/28/2023

## 2023-04-29 NOTE — OUTREACH NOTE
Prep Survey    Flowsheet Row Responses   [facility] facility patient discharged from? Non-BH   Is LACE score < 7 ? Non- Discharge   Eligibility [facility]   Date of Discharge 04/28/23   Discharge Disposition Home or Self Care   Does the patient have one of the following disease processes/diagnoses(primary or secondary)? Other   Does the patient have Home health ordered? No   Prep survey completed? Yes          Candis MATA - Registered Nurse

## 2023-05-01 ENCOUNTER — TRANSITIONAL CARE MANAGEMENT TELEPHONE ENCOUNTER (OUTPATIENT)
Dept: CALL CENTER | Facility: HOSPITAL | Age: 33
End: 2023-05-01
Payer: COMMERCIAL

## 2023-05-01 NOTE — OUTREACH NOTE
Call Center TCM Note    Flowsheet Row Responses   [facility] patient discharged from? Non-BH   Does the patient have one of the following disease processes/diagnoses(primary or secondary)? Other   TCM attempt successful? No   Unsuccessful attempts Attempt 1  [Jane on Verbal Release-no answer]          Emily Colbert RN    5/1/2023, 13:29 CDT

## 2023-05-01 NOTE — OUTREACH NOTE
Call Center TCM Note    Flowsheet Row Responses   [facility] patient discharged from? Non-   Does the patient have one of the following disease processes/diagnoses(primary or secondary)? Other   TCM attempt successful? Yes   Call start time 1358   Call end time 1401   Discharge diagnosis Delusional disorders   Meds reviewed with patient/caregiver? Yes   Is the patient having any side effects they believe may be caused by any medication additions or changes? No   Does the patient have all medications ordered at discharge? Yes   Is the patient taking all medications as directed (includes completed medication regime)? Yes   Comments Hosp dc fu apt on 5/4/23 with PCP ,  pt states she also has an apt with her psychiatrist    Does the patient have an appointment with their PCP within 7 days of discharge? Yes   Has home health visited the patient within 72 hours of discharge? N/A   Did the patient receive a copy of their discharge instructions? Yes   Nursing interventions Reviewed instructions with patient   What is the patient's perception of their health status since discharge? Improving   Is the patient/caregiver able to teach back signs and symptoms related to disease process for when to call PCP? Yes   Is the patient/caregiver able to teach back signs and symptoms related to disease process for when to call 911? Yes   Is the patient/caregiver able to teach back the hierarchy of who to call/visit for symptoms/problems? PCP, Specialist, Home health nurse, Urgent Care, ED, 911 Yes   If the patient is a current smoker, are they able to teach back resources for cessation? Not a smoker   TCM call completed? Yes   Call end time 1401          Emily Colbert RN    5/1/2023, 14:01 CDT

## 2023-05-04 ENCOUNTER — TELEPHONE (OUTPATIENT)
Dept: INTERNAL MEDICINE | Facility: CLINIC | Age: 33
End: 2023-05-04

## 2023-12-18 DIAGNOSIS — E11.65 TYPE 2 DIABETES MELLITUS WITH HYPERGLYCEMIA, WITHOUT LONG-TERM CURRENT USE OF INSULIN: ICD-10-CM

## 2023-12-18 DIAGNOSIS — I10 ESSENTIAL HYPERTENSION: ICD-10-CM

## 2023-12-18 RX ORDER — LISINOPRIL 5 MG/1
5 TABLET ORAL DAILY
Qty: 30 TABLET | Refills: 2 | Status: SHIPPED | OUTPATIENT
Start: 2023-12-18

## 2023-12-28 ENCOUNTER — TELEPHONE (OUTPATIENT)
Dept: INTERNAL MEDICINE | Facility: CLINIC | Age: 33
End: 2023-12-28
Payer: COMMERCIAL

## 2023-12-28 NOTE — TELEPHONE ENCOUNTER
LEFT MESSAGE FOR PT TO CALL AND SCHEDULE A FOLLOW UP APPOINTMENT WITH DR. HERRERA. LET HER KNOW THAT SHE ALREADY HAD THREE NO SHOWS AND SHE WOULD HAVE TO CX THE APPT SHE MAKES WITHIN A 24 HR NOTICE OR SHE COULD BE DISMISSED FROM THE PRACTICE.

## 2024-05-09 PROBLEM — T50.902A INTENTIONAL OVERDOSE: Status: RESOLVED | Noted: 2024-05-09 | Resolved: 2024-05-09

## 2024-05-09 PROBLEM — Z91.89 HISTORY OF DRUG OVERDOSE: Status: ACTIVE | Noted: 2024-05-09

## 2024-05-09 PROBLEM — K58.9 IRRITABLE BOWEL SYNDROME: Status: ACTIVE | Noted: 2024-05-09

## 2024-05-09 PROBLEM — F19.11 HISTORY OF SUBSTANCE ABUSE: Status: ACTIVE | Noted: 2024-05-09

## 2024-05-09 PROBLEM — Z91.89 HISTORY OF DRUG OVERDOSE: Chronic | Status: ACTIVE | Noted: 2024-05-09

## 2024-05-09 PROBLEM — T50.902A INTENTIONAL OVERDOSE: Status: ACTIVE | Noted: 2024-05-09

## 2024-09-26 ENCOUNTER — HOSPITAL ENCOUNTER (INPATIENT)
Age: 34
LOS: 4 days | Discharge: HOME | DRG: 885 | End: 2024-09-30
Attending: PSYCHIATRY & NEUROLOGY | Admitting: PSYCHIATRY & NEUROLOGY
Payer: MEDICAID

## 2024-09-26 DIAGNOSIS — M79.2 PERIPHERAL NEUROPATHIC PAIN: Primary | ICD-10-CM

## 2024-09-26 DIAGNOSIS — R45.851 SUICIDAL IDEATION: ICD-10-CM

## 2024-09-26 DIAGNOSIS — F29 PSYCHOSIS, UNSPECIFIED PSYCHOSIS TYPE (HCC): ICD-10-CM

## 2024-09-26 PROBLEM — F32.A DEPRESSION WITH SUICIDAL IDEATION: Status: ACTIVE | Noted: 2024-09-26

## 2024-09-26 LAB
ALBUMIN SERPL-MCNC: 4.4 G/DL (ref 3.5–5.2)
ALP SERPL-CCNC: 75 U/L (ref 35–104)
ALT SERPL-CCNC: 20 U/L (ref 5–33)
AMPHET UR QL SCN: NEGATIVE
ANION GAP SERPL CALCULATED.3IONS-SCNC: 12 MMOL/L (ref 7–19)
APAP SERPL-MCNC: <5 UG/ML (ref 10–30)
AST SERPL-CCNC: 18 U/L (ref 5–32)
BARBITURATES UR QL SCN: NEGATIVE
BASOPHILS # BLD: 0 K/UL (ref 0–0.2)
BASOPHILS NFR BLD: 0.2 % (ref 0–1)
BENZODIAZ UR QL SCN: POSITIVE
BILIRUB SERPL-MCNC: 0.2 MG/DL (ref 0.2–1.2)
BILIRUB UR QL STRIP: NEGATIVE
BUN SERPL-MCNC: 9 MG/DL (ref 6–20)
BUPRENORPHINE URINE: NEGATIVE
CALCIUM SERPL-MCNC: 9.3 MG/DL (ref 8.6–10)
CANNABINOIDS UR QL SCN: POSITIVE
CHLORIDE SERPL-SCNC: 104 MMOL/L (ref 98–111)
CLARITY UR: CLEAR
CO2 SERPL-SCNC: 23 MMOL/L (ref 22–29)
COCAINE UR QL SCN: NEGATIVE
COLOR UR: YELLOW
CREAT SERPL-MCNC: 0.5 MG/DL (ref 0.5–0.9)
DRUG SCREEN COMMENT UR-IMP: ABNORMAL
EOSINOPHIL # BLD: 0 K/UL (ref 0–0.6)
EOSINOPHIL NFR BLD: 0.4 % (ref 0–5)
ERYTHROCYTE [DISTWIDTH] IN BLOOD BY AUTOMATED COUNT: 11.9 % (ref 11.5–14.5)
ETHANOLAMINE SERPL-MCNC: <10 MG/DL (ref 0–0.08)
FENTANYL SCREEN, URINE: NEGATIVE
GLUCOSE SERPL-MCNC: 91 MG/DL (ref 70–99)
GLUCOSE UR STRIP.AUTO-MCNC: NEGATIVE MG/DL
HCG UR QL: NEGATIVE
HCT VFR BLD AUTO: 42.8 % (ref 37–47)
HGB BLD-MCNC: 13.9 G/DL (ref 12–16)
HGB UR STRIP.AUTO-MCNC: NEGATIVE MG/L
IMM GRANULOCYTES # BLD: 0 K/UL
KETONES UR STRIP.AUTO-MCNC: NEGATIVE MG/DL
LEUKOCYTE ESTERASE UR QL STRIP.AUTO: NEGATIVE
LYMPHOCYTES # BLD: 2.4 K/UL (ref 1.1–4.5)
LYMPHOCYTES NFR BLD: 26.8 % (ref 20–40)
MCH RBC QN AUTO: 30.2 PG (ref 27–31)
MCHC RBC AUTO-ENTMCNC: 32.5 G/DL (ref 33–37)
MCV RBC AUTO: 93 FL (ref 81–99)
METHADONE UR QL SCN: NEGATIVE
METHAMPHETAMINE, URINE: NEGATIVE
MONOCYTES # BLD: 0.6 K/UL (ref 0–0.9)
MONOCYTES NFR BLD: 6.6 % (ref 0–10)
NEUTROPHILS # BLD: 5.9 K/UL (ref 1.5–7.5)
NEUTS SEG NFR BLD: 65.8 % (ref 50–65)
NITRITE UR QL STRIP.AUTO: NEGATIVE
OPIATES UR QL SCN: NEGATIVE
OXYCODONE UR QL SCN: NEGATIVE
PCP UR QL SCN: NEGATIVE
PH UR STRIP.AUTO: 5.5 [PH] (ref 5–8)
PLATELET # BLD AUTO: 233 K/UL (ref 130–400)
PMV BLD AUTO: 11 FL (ref 9.4–12.3)
POTASSIUM SERPL-SCNC: 4.2 MMOL/L (ref 3.5–5)
PROT SERPL-MCNC: 7.4 G/DL (ref 6.4–8.3)
PROT UR STRIP.AUTO-MCNC: NEGATIVE MG/DL
RBC # BLD AUTO: 4.6 M/UL (ref 4.2–5.4)
SALICYLATES SERPL-MCNC: <0.3 MG/DL (ref 3–10)
SARS-COV-2 RDRP RESP QL NAA+PROBE: NOT DETECTED
SODIUM SERPL-SCNC: 139 MMOL/L (ref 136–145)
SP GR UR STRIP.AUTO: 1.01 (ref 1–1.03)
TRICYCLIC ANTIDEPRESSANTS, UR: NEGATIVE
UROBILINOGEN UR STRIP.AUTO-MCNC: 0.2 E.U./DL
WBC # BLD AUTO: 9 K/UL (ref 4.8–10.8)

## 2024-09-26 PROCEDURE — 84443 ASSAY THYROID STIM HORMONE: CPT

## 2024-09-26 PROCEDURE — 82077 ASSAY SPEC XCP UR&BREATH IA: CPT

## 2024-09-26 PROCEDURE — 80143 DRUG ASSAY ACETAMINOPHEN: CPT

## 2024-09-26 PROCEDURE — 84703 CHORIONIC GONADOTROPIN ASSAY: CPT

## 2024-09-26 PROCEDURE — G0480 DRUG TEST DEF 1-7 CLASSES: HCPCS

## 2024-09-26 PROCEDURE — 36415 COLL VENOUS BLD VENIPUNCTURE: CPT

## 2024-09-26 PROCEDURE — 87635 SARS-COV-2 COVID-19 AMP PRB: CPT

## 2024-09-26 PROCEDURE — 85025 COMPLETE CBC W/AUTO DIFF WBC: CPT

## 2024-09-26 PROCEDURE — 99285 EMERGENCY DEPT VISIT HI MDM: CPT

## 2024-09-26 PROCEDURE — 1240000000 HC EMOTIONAL WELLNESS R&B

## 2024-09-26 PROCEDURE — 80179 DRUG ASSAY SALICYLATE: CPT

## 2024-09-26 PROCEDURE — 82607 VITAMIN B-12: CPT

## 2024-09-26 PROCEDURE — 6370000000 HC RX 637 (ALT 250 FOR IP): Performed by: PSYCHIATRY & NEUROLOGY

## 2024-09-26 PROCEDURE — 80307 DRUG TEST PRSMV CHEM ANLYZR: CPT

## 2024-09-26 PROCEDURE — 81003 URINALYSIS AUTO W/O SCOPE: CPT

## 2024-09-26 PROCEDURE — 80053 COMPREHEN METABOLIC PANEL: CPT

## 2024-09-26 PROCEDURE — 82306 VITAMIN D 25 HYDROXY: CPT

## 2024-09-26 RX ORDER — MECOBALAMIN 5000 MCG
5 TABLET,DISINTEGRATING ORAL NIGHTLY
Status: DISCONTINUED | OUTPATIENT
Start: 2024-09-26 | End: 2024-09-30 | Stop reason: HOSPADM

## 2024-09-26 RX ORDER — NICOTINE 21 MG/24HR
1 PATCH, TRANSDERMAL 24 HOURS TRANSDERMAL DAILY
Status: DISCONTINUED | OUTPATIENT
Start: 2024-09-26 | End: 2024-09-30 | Stop reason: HOSPADM

## 2024-09-26 RX ORDER — ACETAMINOPHEN 325 MG/1
650 TABLET ORAL EVERY 4 HOURS PRN
Status: DISCONTINUED | OUTPATIENT
Start: 2024-09-26 | End: 2024-09-30 | Stop reason: HOSPADM

## 2024-09-26 RX ORDER — TRAZODONE HYDROCHLORIDE 50 MG/1
50 TABLET, FILM COATED ORAL NIGHTLY
Status: DISCONTINUED | OUTPATIENT
Start: 2024-09-26 | End: 2024-09-27

## 2024-09-26 RX ORDER — POLYETHYLENE GLYCOL 3350 17 G
2 POWDER IN PACKET (EA) ORAL
Status: DISCONTINUED | OUTPATIENT
Start: 2024-09-26 | End: 2024-09-30 | Stop reason: HOSPADM

## 2024-09-26 RX ORDER — POLYETHYLENE GLYCOL 3350 17 G/17G
17 POWDER, FOR SOLUTION ORAL DAILY PRN
Status: DISCONTINUED | OUTPATIENT
Start: 2024-09-26 | End: 2024-09-30 | Stop reason: HOSPADM

## 2024-09-26 RX ADMIN — NICOTINE POLACRILEX 2 MG: 2 LOZENGE ORAL at 21:08

## 2024-09-26 RX ADMIN — Medication 5 MG: at 21:07

## 2024-09-26 RX ADMIN — TRAZODONE HYDROCHLORIDE 50 MG: 50 TABLET ORAL at 21:08

## 2024-09-26 SDOH — ECONOMIC STABILITY: FOOD INSECURITY: WITHIN THE PAST 12 MONTHS, YOU WORRIED THAT YOUR FOOD WOULD RUN OUT BEFORE YOU GOT MONEY TO BUY MORE.: SOMETIMES TRUE

## 2024-09-26 SDOH — ECONOMIC STABILITY: INCOME INSECURITY: IN THE PAST 12 MONTHS, HAS THE ELECTRIC, GAS, OIL, OR WATER COMPANY THREATENED TO SHUT OFF SERVICE IN YOUR HOME?: YES

## 2024-09-26 SDOH — ECONOMIC STABILITY: INCOME INSECURITY: HOW HARD IS IT FOR YOU TO PAY FOR THE VERY BASICS LIKE FOOD, HOUSING, MEDICAL CARE, AND HEATING?: SOMEWHAT HARD

## 2024-09-26 ASSESSMENT — PATIENT HEALTH QUESTIONNAIRE - PHQ9
5. POOR APPETITE OR OVEREATING: NOT AT ALL
SUM OF ALL RESPONSES TO PHQ QUESTIONS 1-9: 17
3. TROUBLE FALLING OR STAYING ASLEEP: SEVERAL DAYS
6. FEELING BAD ABOUT YOURSELF - OR THAT YOU ARE A FAILURE OR HAVE LET YOURSELF OR YOUR FAMILY DOWN: NEARLY EVERY DAY
2. FEELING DOWN, DEPRESSED OR HOPELESS: MORE THAN HALF THE DAYS
4. FEELING TIRED OR HAVING LITTLE ENERGY: SEVERAL DAYS
SUM OF ALL RESPONSES TO PHQ9 QUESTIONS 1 & 2: 5
1. LITTLE INTEREST OR PLEASURE IN DOING THINGS: NEARLY EVERY DAY
SUM OF ALL RESPONSES TO PHQ QUESTIONS 1-9: 16
9. THOUGHTS THAT YOU WOULD BE BETTER OFF DEAD, OR OF HURTING YOURSELF: SEVERAL DAYS
SUM OF ALL RESPONSES TO PHQ QUESTIONS 1-9: 17
8. MOVING OR SPEAKING SO SLOWLY THAT OTHER PEOPLE COULD HAVE NOTICED. OR THE OPPOSITE, BEING SO FIGETY OR RESTLESS THAT YOU HAVE BEEN MOVING AROUND A LOT MORE THAN USUAL: NEARLY EVERY DAY
7. TROUBLE CONCENTRATING ON THINGS, SUCH AS READING THE NEWSPAPER OR WATCHING TELEVISION: NEARLY EVERY DAY
SUM OF ALL RESPONSES TO PHQ QUESTIONS 1-9: 17

## 2024-09-26 ASSESSMENT — PAIN - FUNCTIONAL ASSESSMENT
PAIN_FUNCTIONAL_ASSESSMENT: 0-10
PAIN_FUNCTIONAL_ASSESSMENT: NONE - DENIES PAIN

## 2024-09-26 ASSESSMENT — PAIN DESCRIPTION - LOCATION: LOCATION: BACK

## 2024-09-26 ASSESSMENT — PAIN DESCRIPTION - PAIN TYPE: TYPE: CHRONIC PAIN

## 2024-09-26 ASSESSMENT — PAIN SCALES - GENERAL: PAINLEVEL_OUTOF10: 5

## 2024-09-26 NOTE — ED PROVIDER NOTES
[facility] EMERGENCY DEPT  EMERGENCY DEPARTMENT ENCOUNTER      Pt Name: Margaret Spann  MRN: [id number]  Birthdate 1990  Date of evaluation: 9/26/2024  Provider: DARRYL Ho CNP  4:49 PM    CHIEF COMPLAINT       Chief Complaint   Patient presents with    Mental Health Problem     SI with plan to OD         HISTORY OF PRESENT ILLNESS    Margaret Spann is a 33 y.o. female who presents to the emergency department requesting medical clearance. States she is suicidal and would take pills to OD. States she has an excess of risperdone at home and would take those. She says she has poor self control, lives alone and is scared of what she might do. She wants to go to [facility] as she has been there before. She is taking her medication as prescribed.    [facility]    Nursing Notes were reviewed.    REVIEW OF SYSTEMS       Review of Systems   Constitutional:  Negative for chills and fever.   HENT:  Negative for congestion.    Respiratory:  Negative for cough and shortness of breath.    Cardiovascular:  Negative for chest pain and palpitations.   Gastrointestinal:  Negative for abdominal pain, nausea and vomiting.   Genitourinary:  Negative for dysuria, flank pain, frequency and urgency.   Musculoskeletal:  Negative for back pain and neck pain.   Neurological:  Negative for dizziness, syncope, weakness, light-headedness, numbness and headaches.   Psychiatric/Behavioral:  Positive for suicidal ideas.              PAST MEDICAL HISTORY     Past Medical History:   Diagnosis Date    ADHD     Hypertension     Schizophrenia (HCC)          SURGICAL HISTORY       Past Surgical History:   Procedure Laterality Date    TONSILLECTOMY      TYMPANOSTOMY TUBE PLACEMENT           CURRENT MEDICATIONS       Previous Medications    No medications on file       ALLERGIES     Hydroxyzine    FAMILY HISTORY     History reviewed. No pertinent family history.       SOCIAL HISTORY       Social History     Socioeconomic History    Marital status:

## 2024-09-26 NOTE — ED NOTES
ED TO INPATIENT SBAR HANDOFF    Patient Name: Margaret Spann   : 1990  33 y.o.   Family/Caregiver Present: No  Code Status Order: Prior    C-SSRS: Risk of Suicide: High Risk  Sitter Yes  Restraints:         Situation  Chief Complaint:   Chief Complaint   Patient presents with    Mental Health Problem     SI with plan to OD     Patient Diagnosis: No admission diagnoses are documented for this encounter.     Brief Description of Patient's Condition: SI with plan to overdose.   Mental Status: oriented, alert, coherent, and able to concentrate and follow conversation  Arrived from: home    Imaging:   No orders to display     COVID-19 Results:   Internal Administration   First Dose      Second Dose           Last COVID Lab SARS-CoV-2, NAAT (no units)   Date Value   2024 Not Detected           Abnormal labs:   Abnormal Labs Reviewed   CBC WITH AUTO DIFFERENTIAL - Abnormal; Notable for the following components:       Result Value    MCHC 32.5 (*)     Neutrophils % 65.8 (*)     All other components within normal limits   DRUG SCRN, BUPRENORPHINE - Abnormal; Notable for the following components:    Benzodiazepine Screen, Urine POSITIVE (*)     Cannabinoid Scrn, Ur POSITIVE (*)     All other components within normal limits   SALICYLATE LEVEL - Abnormal; Notable for the following components:    Salicylate Lvl <0.3 (*)     All other components within normal limits   ACETAMINOPHEN LEVEL - Abnormal; Notable for the following components:    Acetaminophen Level <5 (*)     All other components within normal limits     Background  Allergies:   Allergies   Allergen Reactions    Hydroxyzine Dizziness or Vertigo and Other (See Comments)     Passed out       Current Medications:     History:   Past Medical History:   Diagnosis Date    ADHD     Hypertension     Schizophrenia (HCC)        Assessment  Vitals: Level of Consciousness: Alert (0)   Vitals:    24 1331   BP: (!) 158/88   Pulse: 71   Resp: 17   Temp: 98.4 °F

## 2024-09-27 PROCEDURE — 6370000000 HC RX 637 (ALT 250 FOR IP): Performed by: PSYCHIATRY & NEUROLOGY

## 2024-09-27 PROCEDURE — 1240000000 HC EMOTIONAL WELLNESS R&B

## 2024-09-27 PROCEDURE — 99223 1ST HOSP IP/OBS HIGH 75: CPT | Performed by: PSYCHIATRY & NEUROLOGY

## 2024-09-27 RX ORDER — DOXEPIN HYDROCHLORIDE 50 MG/1
50 CAPSULE ORAL NIGHTLY
Status: DISCONTINUED | OUTPATIENT
Start: 2024-09-27 | End: 2024-09-30 | Stop reason: HOSPADM

## 2024-09-27 RX ORDER — LURASIDONE HYDROCHLORIDE 20 MG/1
40 TABLET, FILM COATED ORAL
Status: DISCONTINUED | OUTPATIENT
Start: 2024-09-27 | End: 2024-09-30 | Stop reason: HOSPADM

## 2024-09-27 RX ORDER — GABAPENTIN 600 MG/1
600 TABLET ORAL 2 TIMES DAILY
Status: DISCONTINUED | OUTPATIENT
Start: 2024-09-27 | End: 2024-09-30 | Stop reason: HOSPADM

## 2024-09-27 RX ORDER — LISINOPRIL 10 MG/1
5 TABLET ORAL DAILY
Status: DISCONTINUED | OUTPATIENT
Start: 2024-09-27 | End: 2024-09-30 | Stop reason: HOSPADM

## 2024-09-27 RX ORDER — ALBUTEROL SULFATE 90 UG/1
2 INHALANT RESPIRATORY (INHALATION) EVERY 6 HOURS PRN
Status: DISCONTINUED | OUTPATIENT
Start: 2024-09-27 | End: 2024-09-30 | Stop reason: HOSPADM

## 2024-09-27 RX ORDER — DOXAZOSIN 1 MG/1
1 TABLET ORAL NIGHTLY
Status: DISCONTINUED | OUTPATIENT
Start: 2024-09-27 | End: 2024-09-30 | Stop reason: HOSPADM

## 2024-09-27 RX ORDER — ESCITALOPRAM OXALATE 10 MG/1
10 TABLET ORAL DAILY
Status: DISCONTINUED | OUTPATIENT
Start: 2024-09-27 | End: 2024-09-30 | Stop reason: HOSPADM

## 2024-09-27 RX ADMIN — NICOTINE POLACRILEX 2 MG: 2 LOZENGE ORAL at 14:56

## 2024-09-27 RX ADMIN — NICOTINE POLACRILEX 2 MG: 2 LOZENGE ORAL at 09:49

## 2024-09-27 RX ADMIN — NICOTINE POLACRILEX 2 MG: 2 LOZENGE ORAL at 17:24

## 2024-09-27 RX ADMIN — DOXEPIN HYDROCHLORIDE 50 MG: 50 CAPSULE ORAL at 20:33

## 2024-09-27 RX ADMIN — DOXAZOSIN 1 MG: 1 TABLET ORAL at 20:33

## 2024-09-27 RX ADMIN — ACETAMINOPHEN 650 MG: 325 TABLET ORAL at 00:44

## 2024-09-27 RX ADMIN — ESCITALOPRAM OXALATE 10 MG: 10 TABLET ORAL at 10:53

## 2024-09-27 RX ADMIN — LURASIDONE HYDROCHLORIDE 40 MG: 20 TABLET, FILM COATED ORAL at 16:54

## 2024-09-27 RX ADMIN — NICOTINE POLACRILEX 2 MG: 2 LOZENGE ORAL at 20:37

## 2024-09-27 RX ADMIN — GABAPENTIN 600 MG: 600 TABLET, FILM COATED ORAL at 10:53

## 2024-09-27 RX ADMIN — Medication 5 MG: at 20:33

## 2024-09-27 RX ADMIN — LISINOPRIL 5 MG: 10 TABLET ORAL at 10:53

## 2024-09-27 RX ADMIN — NICOTINE POLACRILEX 2 MG: 2 LOZENGE ORAL at 12:26

## 2024-09-27 RX ADMIN — GABAPENTIN 600 MG: 600 TABLET, FILM COATED ORAL at 20:33

## 2024-09-27 ASSESSMENT — LIFESTYLE VARIABLES
HOW OFTEN DO YOU HAVE A DRINK CONTAINING ALCOHOL: PATIENT UNABLE TO ANSWER
HOW MANY STANDARD DRINKS CONTAINING ALCOHOL DO YOU HAVE ON A TYPICAL DAY: PATIENT UNABLE TO ANSWER

## 2024-09-27 ASSESSMENT — SLEEP AND FATIGUE QUESTIONNAIRES
SLEEP PATTERN: NIGHTMARES/TERRORS
DO YOU HAVE DIFFICULTY SLEEPING: YES

## 2024-09-27 ASSESSMENT — PAIN - FUNCTIONAL ASSESSMENT: PAIN_FUNCTIONAL_ASSESSMENT: ACTIVITIES ARE NOT PREVENTED

## 2024-09-27 ASSESSMENT — PAIN DESCRIPTION - LOCATION: LOCATION: HEAD

## 2024-09-27 ASSESSMENT — PAIN SCALES - GENERAL
PAINLEVEL_OUTOF10: 0
PAINLEVEL_OUTOF10: 8

## 2024-09-27 ASSESSMENT — PAIN DESCRIPTION - ORIENTATION: ORIENTATION: ANTERIOR

## 2024-09-27 ASSESSMENT — PAIN DESCRIPTION - DESCRIPTORS: DESCRIPTORS: ACHING;DISCOMFORT
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
## 2024-09-27 NOTE — DISCHARGE INSTRUCTIONS
[employer]  Educates and supports moms and dads through the journey of pregnancy and parenting.  Website: https://[facility]org/  [address], [place] [address]  [phone]    KYNECTOR  Speak with a local KYNECTOR to see if you qualify for [place] Medicaid primary or secondary coverage.  Free help with your benefit application is available through a KYNECTOR.   Website: https://kynect.[place].gov/benefits/s/vkdx-ducq-cyyeihhxt    [employer]  Free resource to assist with financial assistance programs at outside hospitals  Website: https://[employer].Mimoco/    CancerCare  Financial assistance for cancer-related costs and co-pays  Website: https://www.cancercare.org/financial  756.187.8353    Mammogram and Ultrasound Assistance (Breast Cancer Assistance Program)  Provides assistance to uninsured and underinsured individuals for breast cancer screening  Website: https://www.abcf.org/our-programs/breast-cancer-assistance-program/  545.662.9421    Free Colon Cancer Screening Program  Provides assistance to uninsured and underinsured [place] residents based on financial eligibility.  Website: https://kycancerlink.org/colon-cancer/  908.686.7454    Employment Resources:  Employment & Training Office (727) 416-3959  7:30 AM - 5?PM Monday - Tuesday  7:30 AM - 4:30 PM Wednesday - Thursday  7:30 AM - 12 PM Friday  www.oet.[place].gov   [employer] ([phone] 394-8574  8 AM - 5?PM Monday - Friday  [employer] (649) 092-1955  www.[employer]   [employer] (923) 566-7862  8 AM - 5?PM Monday - Friday  www.people-le[employer]Gym.com   [employer] (421) 937-1128  8 AM - 12?PM, 1 PM - 5 PM Monday - Friday  www.[employer].com   [employer] ([phone] 358-7384  8 AM - 5?PM Monday - Thursday  8 AM - 4 PM Friday  www.permaSaborstudiostaff.com   [facility] ([phone] 067-7311  [facility] [phone]  8 AM - 5?PM Monday - Friday  [employer] [phone]  7 AM - 6?PM Monday -

## 2024-09-27 NOTE — GROUP NOTE
Group Therapy Note     Date: 9/27/2024     Group Start Time: 1010  Group End Time: 1040  Group Topic: Psychoeducation     [facility]    Sofya Sheppard                 Patient's Goal:  Planning to [place] with Problems     Notes: Pt discussed goal setting and shared some goals they hope to achieve in the near future to cope with their current problems, which included working on herself, finding new positive coping skills, and trying individual therapy after discharge.     Status After Intervention:  Improved     Participation Level: Active Listener and Interactive     Participation Quality: Appropriate, Attentive, and Sharing        Speech:  normal        Thought Process/Content: Logical        Affective Functioning: Congruent        Mood: euthymic        Level of consciousness:  Alert, Oriented x4, and Attentive        Response to Learning: Able to verbalize current knowledge/experience and Able to verbalize/acknowledge new learning        Endings: None Reported     Modes of Intervention: Support, Socialization, and Exploration        Discipline Responsible: Psychoeducational Specialist        Signature:  Sofya Sheppard

## 2024-09-28 LAB
25(OH)D3 SERPL-MCNC: 23.9 NG/ML
TSH SERPL DL<=0.005 MIU/L-ACNC: 0.84 UIU/ML (ref 0.27–4.2)
VIT B12 SERPL-MCNC: 340 PG/ML (ref 232–1245)

## 2024-09-28 PROCEDURE — 6370000000 HC RX 637 (ALT 250 FOR IP): Performed by: PSYCHIATRY & NEUROLOGY

## 2024-09-28 PROCEDURE — 99232 SBSQ HOSP IP/OBS MODERATE 35: CPT | Performed by: PSYCHIATRY & NEUROLOGY

## 2024-09-28 PROCEDURE — 1240000000 HC EMOTIONAL WELLNESS R&B

## 2024-09-28 RX ORDER — TOPIRAMATE 25 MG/1
25 TABLET, FILM COATED ORAL NIGHTLY
Status: DISCONTINUED | OUTPATIENT
Start: 2024-09-28 | End: 2024-09-30

## 2024-09-28 RX ADMIN — DOXAZOSIN 1 MG: 1 TABLET ORAL at 20:48

## 2024-09-28 RX ADMIN — GABAPENTIN 600 MG: 600 TABLET, FILM COATED ORAL at 08:04

## 2024-09-28 RX ADMIN — NICOTINE POLACRILEX 2 MG: 2 LOZENGE ORAL at 20:48

## 2024-09-28 RX ADMIN — NICOTINE POLACRILEX 2 MG: 2 LOZENGE ORAL at 08:09

## 2024-09-28 RX ADMIN — TOPIRAMATE 25 MG: 25 TABLET, FILM COATED ORAL at 20:48

## 2024-09-28 RX ADMIN — Medication 5 MG: at 20:48

## 2024-09-28 RX ADMIN — DOXEPIN HYDROCHLORIDE 50 MG: 50 CAPSULE ORAL at 20:48

## 2024-09-28 RX ADMIN — LURASIDONE HYDROCHLORIDE 40 MG: 20 TABLET, FILM COATED ORAL at 16:31

## 2024-09-28 RX ADMIN — GABAPENTIN 600 MG: 600 TABLET, FILM COATED ORAL at 20:48

## 2024-09-28 RX ADMIN — NICOTINE POLACRILEX 2 MG: 2 LOZENGE ORAL at 14:24

## 2024-09-28 RX ADMIN — NICOTINE POLACRILEX 2 MG: 2 LOZENGE ORAL at 11:45

## 2024-09-28 RX ADMIN — ESCITALOPRAM OXALATE 10 MG: 10 TABLET ORAL at 08:04

## 2024-09-28 RX ADMIN — LISINOPRIL 5 MG: 10 TABLET ORAL at 08:04

## 2024-09-28 NOTE — RESEARCH
ABELINO attempted to obtain collateral from: Hollie SpannVmnroz-[phone]-pt's sister, but it said, \"the wireless customer you are calling is not available. Please try your call again later.\"

## 2024-09-28 NOTE — H&P
HISTORY and PHYSICAL      CHIEF COMPLAINT:  Depression, SI    Reason for Admission:  Depression, SI    History Obtained From:  patient, chart    HISTORY OF PRESENT ILLNESS:      The patient is a 33 y.o. female who is admitted to the [facility] unit with worsening mood issues. She denies any physical complaints. She has had no c/o chest pain or SOA. She has had no abdominal pain or N/V. She has had no new pain issues. No fevers.     Past Medical History:        Diagnosis Date    ADHD     Hypertension     Schizophrenia (HCC)      Past Surgical History:        Procedure Laterality Date    TONSILLECTOMY      TYMPANOSTOMY TUBE PLACEMENT           Medications Prior to Admission:    No medications prior to admission.    Allergies:  Hydroxyzine    Social History:   TOBACCO:   reports that she has been smoking cigarettes. She has been exposed to tobacco smoke. She has never used smokeless tobacco.  ETOH:   reports that she does not currently use alcohol.  DRUGS:   reports that she does not currently use drugs.  MARITAL STATUS:  single  OCCUPATION:  filed for disability      Family History:   History reviewed. No pertinent family history.  REVIEW OF SYSTEMS:  Constitutional: neg  CV: neg  Pulmonary: neg  GI: neg  : neg  Psych: psychosis  Neuro: neg  Skin: neg  MusculoSkeletal: neg  HEENT: neg  Joints: neg    Vitals:  /81   Pulse 88   Temp 98.2 °F (36.8 °C)   Resp 16   Ht 1.676 m (5' 6\")   Wt 114.8 kg (253 lb)   SpO2 96%   BMI 40.84 kg/m²     PHYSICAL EXAM:  Gen: NAD, alert  HEENT: WNL  Lymph: no LAD  Neck: no JVD or masses  Chest: CTA bilat  CV: RRR  Abdomen: NT/ND  Extrem: no C/C/E  Neuro: non focal  Skin: no rashes  Joints: no redness    DATA:  I have reviewed the admission labs and imaging tests.    ASSESSMENT AND PLAN:      Principal Problem:    Depression, SI---follow with Psych   THC Use    HTN--follow BP           Fadi Carrasquillo MD  8:35 AM 9/28/2024

## 2024-09-29 PROCEDURE — 6370000000 HC RX 637 (ALT 250 FOR IP): Performed by: PSYCHIATRY & NEUROLOGY

## 2024-09-29 PROCEDURE — 6370000000 HC RX 637 (ALT 250 FOR IP): Performed by: FAMILY MEDICINE

## 2024-09-29 PROCEDURE — 1240000000 HC EMOTIONAL WELLNESS R&B

## 2024-09-29 RX ORDER — UREA 10 %
500 LOTION (ML) TOPICAL DAILY
Status: DISCONTINUED | OUTPATIENT
Start: 2024-09-29 | End: 2024-09-30 | Stop reason: HOSPADM

## 2024-09-29 RX ADMIN — NICOTINE POLACRILEX 2 MG: 2 LOZENGE ORAL at 12:01

## 2024-09-29 RX ADMIN — NICOTINE POLACRILEX 2 MG: 2 LOZENGE ORAL at 15:11

## 2024-09-29 RX ADMIN — Medication 5000 UNITS: at 09:07

## 2024-09-29 RX ADMIN — TOPIRAMATE 25 MG: 25 TABLET, FILM COATED ORAL at 20:22

## 2024-09-29 RX ADMIN — NICOTINE POLACRILEX 2 MG: 2 LOZENGE ORAL at 08:28

## 2024-09-29 RX ADMIN — NICOTINE POLACRILEX 2 MG: 2 LOZENGE ORAL at 20:22

## 2024-09-29 RX ADMIN — NICOTINE POLACRILEX 2 MG: 2 LOZENGE ORAL at 18:02

## 2024-09-29 RX ADMIN — Medication 5 MG: at 20:22

## 2024-09-29 RX ADMIN — LURASIDONE HYDROCHLORIDE 40 MG: 20 TABLET, FILM COATED ORAL at 16:51

## 2024-09-29 RX ADMIN — DOXEPIN HYDROCHLORIDE 50 MG: 50 CAPSULE ORAL at 20:22

## 2024-09-29 RX ADMIN — CYANOCOBALAMIN TAB 500 MCG 500 MCG: 500 TAB at 09:07

## 2024-09-29 RX ADMIN — GABAPENTIN 600 MG: 600 TABLET, FILM COATED ORAL at 20:22

## 2024-09-29 RX ADMIN — LISINOPRIL 5 MG: 10 TABLET ORAL at 07:54

## 2024-09-29 RX ADMIN — ESCITALOPRAM OXALATE 10 MG: 10 TABLET ORAL at 07:54

## 2024-09-29 RX ADMIN — GABAPENTIN 600 MG: 600 TABLET, FILM COATED ORAL at 07:54

## 2024-09-29 RX ADMIN — DOXAZOSIN 1 MG: 1 TABLET ORAL at 20:22

## 2024-09-29 RX ADMIN — ACETAMINOPHEN 650 MG: 325 TABLET ORAL at 20:22

## 2024-09-29 ASSESSMENT — PAIN DESCRIPTION - ONSET: ONSET: GRADUAL

## 2024-09-29 ASSESSMENT — PAIN DESCRIPTION - PAIN TYPE: TYPE: ACUTE PAIN

## 2024-09-29 ASSESSMENT — PAIN - FUNCTIONAL ASSESSMENT: PAIN_FUNCTIONAL_ASSESSMENT: ACTIVITIES ARE NOT PREVENTED

## 2024-09-29 ASSESSMENT — PAIN SCALES - GENERAL: PAINLEVEL_OUTOF10: 7

## 2024-09-29 ASSESSMENT — PAIN DESCRIPTION - FREQUENCY: FREQUENCY: INTERMITTENT

## 2024-09-29 ASSESSMENT — PAIN DESCRIPTION - ORIENTATION: ORIENTATION: RIGHT

## 2024-09-29 ASSESSMENT — PAIN DESCRIPTION - DESCRIPTORS: DESCRIPTORS: ACHING;DISCOMFORT

## 2024-09-29 ASSESSMENT — PAIN DESCRIPTION - LOCATION: LOCATION: HIP

## 2024-09-29 NOTE — BH NOTE
[facility] Adult Unit Daily Assessment  Nursing Progress Note    Room: [facility]-01   Name: Margaret Spann   Age: 33 y.o.   Gender: female   Dx: Depression with suicidal ideation  Precautions: close watch and suicide risk  Inpatient Status: voluntary     SLEEP:  Sleep Quality Good  Sleep Medications: Yes   PRN Sleep Meds: Yes     MEDICAL:  Other PRN Meds: Yes   Med Compliant: Yes  Accu-Chek: No  Oxygen/CPAP/BiPAP: No  CIWA/CINA: No   PAIN Assessment: none  Side Effects from medication: No    Metabolic Screening:  Lab Results   Component Value Date    LABA1C 6.5 (H) 04/21/2023     Lab Results   Component Value Date    CHOL 127 (L) 04/24/2023     Lab Results   Component Value Date    TRIG 128 04/24/2023     Lab Results   Component Value Date    HDL 31 (L) 04/24/2023     No components found for: \"LDLCAL\"  No components found for: \"LABVLDL\"  Body mass index is 40.84 kg/m².  BP Readings from Last 2 Encounters:   09/28/24 128/81   04/28/23 (!) 142/98       Medical Bed:   Is patient in a medical bed? NA   If medical bed is in use, has nursing secured room while patient is awake and out of the room? NA  Has safety checks by nursing been completed on the bed/room this shift? NA    Protective Factors:  Patient identifies protective factors with nursing staff as follows:   Identifies reasons for living: Yes   Supportive Social Network or family: Yes    Belief that suicide is immoral/high spirituality: Yes   Responsibility to family or others/living with family: Yes   Fear of death or dying due to pain and suffering: Yes   Engaged in work or school: No  If Patient is unable to identify, reason why?     Depression: 5   Anxiety: 3   SI denies suicidal ideation   Risk of Suicide: No Risk  HI Negative for homicidal ideation        AVH:no If Hallucinations are present, describe?     Appetite: no change from normal   Percent Meals: 100%   Social: Yes   Speech: normal   Appearance: appropriately dressed and healthy looking    GROUP:  Group 
Participation: Yes  Participation Quality: Active Listener and Interactive    Notes: The patient is cooperative and med compliant. She attends group and has a good appetite. She endorses moderate depression and denies anxiety. She performs ADLs. She is social and participates in activities.               Electronically signed by Goldy Marlow RN on 9/29/24 at 11:17 AM CDT

## 2024-09-30 VITALS
DIASTOLIC BLOOD PRESSURE: 84 MMHG | BODY MASS INDEX: 40.66 KG/M2 | HEART RATE: 89 BPM | SYSTOLIC BLOOD PRESSURE: 134 MMHG | HEIGHT: 66 IN | OXYGEN SATURATION: 97 % | WEIGHT: 253 LBS | TEMPERATURE: 98.1 F | RESPIRATION RATE: 20 BRPM

## 2024-09-30 PROCEDURE — 5130000000 HC BRIDGE APPOINTMENT

## 2024-09-30 PROCEDURE — 99239 HOSP IP/OBS DSCHRG MGMT >30: CPT | Performed by: PSYCHIATRY & NEUROLOGY

## 2024-09-30 PROCEDURE — 6370000000 HC RX 637 (ALT 250 FOR IP): Performed by: PSYCHIATRY & NEUROLOGY

## 2024-09-30 PROCEDURE — 6370000000 HC RX 637 (ALT 250 FOR IP): Performed by: FAMILY MEDICINE

## 2024-09-30 RX ORDER — DOXAZOSIN 2 MG/1
1 TABLET ORAL NIGHTLY
Qty: 15 TABLET | Refills: 0 | Status: SHIPPED | OUTPATIENT
Start: 2024-09-30

## 2024-09-30 RX ORDER — GABAPENTIN 600 MG/1
600 TABLET ORAL 2 TIMES DAILY
Qty: 60 TABLET | Refills: 0 | Status: SHIPPED | OUTPATIENT
Start: 2024-09-30 | End: 2024-10-30

## 2024-09-30 RX ORDER — LURASIDONE HYDROCHLORIDE 40 MG/1
40 TABLET, FILM COATED ORAL
Qty: 30 TABLET | Refills: 0 | Status: SHIPPED | OUTPATIENT
Start: 2024-09-30

## 2024-09-30 RX ORDER — ACETAMINOPHEN 80 MG
1 TABLET,CHEWABLE ORAL ONCE
Qty: 1 EACH | Refills: 0 | Status: SHIPPED | OUTPATIENT
Start: 2024-09-30 | End: 2024-09-30

## 2024-09-30 RX ORDER — MECOBALAMIN 5000 MCG
5 TABLET,DISINTEGRATING ORAL NIGHTLY
Qty: 30 TABLET | Refills: 0 | Status: SHIPPED | OUTPATIENT
Start: 2024-09-30

## 2024-09-30 RX ORDER — DOXEPIN HYDROCHLORIDE 50 MG/1
50 CAPSULE ORAL NIGHTLY
Qty: 30 CAPSULE | Refills: 0 | Status: SHIPPED | OUTPATIENT
Start: 2024-09-30

## 2024-09-30 RX ORDER — DOXAZOSIN 1 MG/1
1 TABLET ORAL NIGHTLY
Qty: 30 TABLET | Refills: 0 | Status: SHIPPED | OUTPATIENT
Start: 2024-09-30 | End: 2024-09-30

## 2024-09-30 RX ORDER — POLYETHYLENE GLYCOL 3350 17 G
2 POWDER IN PACKET (EA) ORAL
Qty: 168 EACH | Refills: 0 | Status: SHIPPED | OUTPATIENT
Start: 2024-09-30 | End: 2024-09-30 | Stop reason: HOSPADM

## 2024-09-30 RX ORDER — LISINOPRIL 5 MG/1
5 TABLET ORAL DAILY
Qty: 30 TABLET | Refills: 0 | Status: SHIPPED | OUTPATIENT
Start: 2024-10-01

## 2024-09-30 RX ORDER — ERGOCALCIFEROL 1.25 MG/1
50000 CAPSULE ORAL WEEKLY
Qty: 5 CAPSULE | Refills: 0 | Status: SHIPPED | OUTPATIENT
Start: 2024-09-30

## 2024-09-30 RX ORDER — ESCITALOPRAM OXALATE 10 MG/1
10 TABLET ORAL DAILY
Qty: 30 TABLET | Refills: 0 | Status: SHIPPED | OUTPATIENT
Start: 2024-10-01

## 2024-09-30 RX ORDER — NICOTINE 21 MG/24HR
1 PATCH, TRANSDERMAL 24 HOURS TRANSDERMAL DAILY
Qty: 30 PATCH | Refills: 0 | Status: SHIPPED | OUTPATIENT
Start: 2024-10-01

## 2024-09-30 RX ADMIN — LISINOPRIL 5 MG: 10 TABLET ORAL at 08:20

## 2024-09-30 RX ADMIN — Medication 5000 UNITS: at 08:17

## 2024-09-30 RX ADMIN — GABAPENTIN 600 MG: 600 TABLET, FILM COATED ORAL at 08:18

## 2024-09-30 RX ADMIN — ESCITALOPRAM OXALATE 10 MG: 10 TABLET ORAL at 08:17

## 2024-09-30 RX ADMIN — NICOTINE POLACRILEX 2 MG: 2 LOZENGE ORAL at 07:56

## 2024-09-30 RX ADMIN — CYANOCOBALAMIN TAB 500 MCG 500 MCG: 500 TAB at 08:17

## 2024-09-30 RX ADMIN — NICOTINE POLACRILEX 2 MG: 2 LOZENGE ORAL at 10:51

## 2024-09-30 NOTE — PLAN OF CARE
Problem: Coping  Goal: Pt/Family able to verbalize concerns and demonstrate effective coping strategies  Description: INTERVENTIONS:  1. Assist patient/family to identify coping skills, available support systems and cultural and spiritual values  2. Provide emotional support, including active listening and acknowledgement of concerns of patient and caregivers  3. Reduce environmental stimuli, as able  4. Instruct patient/family in relaxation techniques, as appropriate  5. Assess for spiritual pain/suffering and initiate Spiritual Care, Psychosocial Clinical Specialist consults as needed  Outcome: Progressing     Problem: Behavior  Goal: Pt/Family maintain appropriate behavior and adhere to behavioral management agreement, if implemented  Description: INTERVENTIONS:  1. Assess patient/family's coping skills and  non-compliant behavior (including use of illegal substances)  2. Notify security of behavior or suspected illegal substances which indicate the need for search of the family and/or belongings  3. Encourage verbalization of thoughts and concerns in a socially appropriate manner  4. Utilize positive, consistent limit setting strategies supporting safety of patient, staff and others  5. Encourage participation in the decision making process about the behavioral management agreement  6. If a visitor's behavior poses a threat to safety call refer to organization policy.  7. Initiate consult with , Psychosocial CNS, Spiritual Care as appropriate  Outcome: Progressing     Problem: Depression/Self Harm  Goal: Effect of psychiatric condition will be minimized and patient will be protected from self harm  Description: INTERVENTIONS:  1. Assess impact of patient's symptoms on level of functioning, self care needs and offer support as indicated  2. Assess patient/family knowledge of depression, impact on illness and need for teaching  3. Provide emotional support, presence and reassurance  4. Assess for 
  Problem: Coping  Goal: Pt/Family able to verbalize concerns and demonstrate effective coping strategies  Description: INTERVENTIONS:  1. Assist patient/family to identify coping skills, available support systems and cultural and spiritual values  2. Provide emotional support, including active listening and acknowledgement of concerns of patient and caregivers  3. Reduce environmental stimuli, as able  4. Instruct patient/family in relaxation techniques, as appropriate  5. Assess for spiritual pain/suffering and initiate Spiritual Care, Psychosocial Clinical Specialist consults as needed  Outcome: Progressing     Problem: Depression/Self Harm  Goal: Effect of psychiatric condition will be minimized and patient will be protected from self harm  Description: INTERVENTIONS:  1. Assess impact of patient's symptoms on level of functioning, self care needs and offer support as indicated  2. Assess patient/family knowledge of depression, impact on illness and need for teaching  3. Provide emotional support, presence and reassurance  4. Assess for possible suicidal thoughts or ideation. If patient expresses suicidal thoughts or statements do not leave alone, initiate Suicide Precautions, move to a room close to the nursing station and obtain sitter  5. Initiate consults as appropriate with Mental Health Professional, Spiritual Care, Psychosocial CNS, and consider a recommendation to the LIP for a Psychiatric Consultation  Outcome: Progressing     Problem: Behavior  Goal: Pt/Family maintain appropriate behavior and adhere to behavioral management agreement, if implemented  Description: INTERVENTIONS:  1. Assess patient/family's coping skills and  non-compliant behavior (including use of illegal substances)  2. Notify security of behavior or suspected illegal substances which indicate the need for search of the family and/or belongings  3. Encourage verbalization of thoughts and concerns in a socially appropriate manner  4. 
  Problem: Coping  Goal: Pt/Family able to verbalize concerns and demonstrate effective coping strategies  Description: INTERVENTIONS:  1. Assist patient/family to identify coping skills, available support systems and cultural and spiritual values  2. Provide emotional support, including active listening and acknowledgement of concerns of patient and caregivers  3. Reduce environmental stimuli, as able  4. Instruct patient/family in relaxation techniques, as appropriate  5. Assess for spiritual pain/suffering and initiate Spiritual Care, Psychosocial Clinical Specialist consults as needed  Outcome: Progressing  Note:                                                                     Group Therapy Note    Date: 9/30/2024  Start Time: 1000  End Time:  1030  Number of Participants: 9    Type of Group: Psychoeducation    Wellness Binder Information  Module Name:  Relapse Prevention   Session Number:  5    Group Goal for Pt:  To improve knowledge of relapse prevention strategies    Notes:  Pt demonstrated improved knowledge of relapse prevention strategies by actively participating in group discussion.    Status After Intervention:  Unchanged    Participation Level: Active Listener    Participation Quality: Appropriate and Attentive      Speech:  normal      Thought Process/Content: Logical      Affective Functioning: Congruent      Mood: anxious and depressed      Level of consciousness:  Alert and Oriented x4      Response to Learning: Able to verbalize current knowledge/experience, Able to verbalize/acknowledge new learning, and Progressing to goal      Endings: None Reported    Modes of Intervention: Education      Discipline Responsible: Psychoeducational Specialist      Signature:  Megan Mazariegos       
  Problem: Coping  Goal: Pt/Family able to verbalize concerns and demonstrate effective coping strategies  Outcome: Progressing                                                                       Group Therapy Note    Date: 9/27/2024  Start Time: 1100  End Time:  1130  Number of Participants: 4    Type of Group: Psychotherapy    Wellness Binder Information  Module Name:  emotional wellness  Session Number:  5    Patient's Goal:  obstacles to emotional wellness    Notes:  pt acknowledged negative thinking as an obstacle to emotional wellness.    Status After Intervention:  Improved    Participation Level: Interactive    Participation Quality: Appropriate, Attentive, and Sharing      Speech:  normal      Thought Process/Content: Logical      Affective Functioning: Congruent      Mood: congruent      Level of consciousness:  Alert, Oriented x4, and Attentive      Response to Learning: Able to verbalize current knowledge/experience      Endings: None Reported    Modes of Intervention: Education      Discipline Responsible: /Counselor      Signature:  Milla Castañeda    
and  non-compliant behavior (including use of illegal substances)  2. Notify security of behavior or suspected illegal substances which indicate the need for search of the family and/or belongings  3. Encourage verbalization of thoughts and concerns in a socially appropriate manner  4. Utilize positive, consistent limit setting strategies supporting safety of patient, staff and others  5. Encourage participation in the decision making process about the behavioral management agreement  6. If a visitor's behavior poses a threat to safety call refer to organization policy.  7. Initiate consult with , Psychosocial CNS, Spiritual Care as appropriate  Outcome: Progressing
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
## 2024-09-30 NOTE — DISCHARGE INSTR - DIET

## 2024-10-01 ENCOUNTER — FOLLOWUP TELEPHONE ENCOUNTER (OUTPATIENT)
Dept: PSYCHIATRY | Age: 34
End: 2024-10-01

## 2024-10-01 NOTE — PROGRESS NOTES
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 was just talking to her because there was nobodby else to talk to. We were just talking about which way was East to watch the sunrise.\" Pt went on to apologize if \"they\" were being too loud. Pt was calm at the encounter. Obviously RIS at this time. Will continue to monitor for safety as ordered.     Electronically signed by KERRIE RIVERA RN on 9/29/2024 at 6:01 AM   
Patient arrived on the floor in a wheel chair with a sitter and . Vitals were taken and patient was shown to her room where safety search was performed and no contraband was found. Patient oriented to floor.  
Progress Note  Margaret Spann  9/28/2024 8:37 AM  Subjective:   Admit Date:   9/26/2024      CC/ADMIT DX:       Interval History:   Reviewed overnight events and nursing notes. She has had no new medical issues.     I have reviewed all labs/diagnostics from the last 24hrs.       ROS:   I have done a 10 point ROS and all are negative, except what is mentioned in the HPI.    ADULT DIET; Regular; Safety Tray; Safety Tray (Disposables)    Medications:      doxazosin  1 mg Oral Nightly    doxepin  50 mg Oral Nightly    gabapentin  600 mg Oral BID    lisinopril  5 mg Oral Daily    escitalopram  10 mg Oral Daily    lurasidone  40 mg Oral Dinner    melatonin  5 mg Oral Nightly    nicotine  1 patch TransDERmal Daily           Objective:   Vitals: /81   Pulse 88   Temp 98.2 °F (36.8 °C)   Resp 16   Ht 1.676 m (5' 6\")   Wt 114.8 kg (253 lb)   SpO2 96%   BMI 40.84 kg/m²  No intake or output data in the 24 hours ending 09/28/24 0837  General appearance: alert and cooperative with exam  Extremities: extremities normal, atraumatic, no cyanosis or edema  Neurologic:  No obvious focal neurologic deficits.    Assessment and Plan:   Principal Problem:    Depression with suicidal ideation  Resolved Problems:    * No resolved hospital problems. *        Plan:   Continue present medication(s)    Follow with Psych   She is medically stable. I will monitor for any changes or concerns.       Discharge planning:   her home     Reviewed treatment plans with the patient and/or family.             Electronically signed by Fadi Carrasquillo MD on 9/28/2024 at 8:37 AM  
Progress Note  Margaret Spann  9/29/2024 9:02 AM  Subjective:   Admit Date:   9/26/2024      CC/ADMIT DX:       Interval History:   Reviewed overnight events and nursing notes. She denies any new medical issues.     I have reviewed all labs/diagnostics from the last 24hrs.       ROS:   I have done a 10 point ROS and all are negative, except what is mentioned in the HPI.    ADULT DIET; Regular; Safety Tray; Safety Tray (Disposables)    Medications:      vitamin D  5,000 Units Oral Daily    vitamin B-12  500 mcg Oral Daily    topiramate  25 mg Oral Nightly    doxazosin  1 mg Oral Nightly    doxepin  50 mg Oral Nightly    gabapentin  600 mg Oral BID    lisinopril  5 mg Oral Daily    escitalopram  10 mg Oral Daily    lurasidone  40 mg Oral Dinner    melatonin  5 mg Oral Nightly    nicotine  1 patch TransDERmal Daily           Objective:   Vitals: /79   Pulse 86   Temp 97.3 °F (36.3 °C) (Temporal)   Resp 18   Ht 1.676 m (5' 6\")   Wt 114.8 kg (253 lb)   SpO2 95%   BMI 40.84 kg/m²  No intake or output data in the 24 hours ending 09/29/24 0902  General appearance: alert and cooperative with exam  Extremities: extremities normal, atraumatic, no cyanosis or edema  Neurologic:  No obvious focal neurologic deficits.    Assessment and Plan:   Principal Problem:    Depression with suicidal ideation  Resolved Problems:    * No resolved hospital problems. *      Vit D Def    Plan:   Continue present medication(s)    Follow with Psych   Replace Vit D      Discharge planning:   her home     Reviewed treatment plans with the patient and/or family.             Electronically signed by Fadi Carrasquillo MD on 9/29/2024 at 9:02 AM  
Progress Note  Margaret Spann  9/30/2024 11:06 PM  Subjective:   Admit Date:   9/26/2024      CC/ADMIT DX:       Interval History:   Reviewed overnight events and nursing notes. She has had no new medical issues.     I have reviewed all labs/diagnostics from the last 24hrs.       ROS:   I have done a 10 point ROS and all are negative, except what is mentioned in the HPI.    No diet orders on file    Medications:               Objective:   Vitals: /84   Pulse 89   Temp 98.1 °F (36.7 °C)   Resp 20   Ht 1.676 m (5' 6\")   Wt 114.8 kg (253 lb)   SpO2 97%   BMI 40.84 kg/m²  No intake or output data in the 24 hours ending 09/30/24 2306  General appearance: alert and cooperative with exam  Extremities: extremities normal, atraumatic, no cyanosis or edema  Neurologic:  No obvious focal neurologic deficits.    Assessment and Plan:   Principal Problem:    Depression with suicidal ideation  Resolved Problems:    * No resolved hospital problems. *      Vit D Def    Plan:   Continue present medication(s)    Follow with Psych   She is medically stable. I will monitor for any changes or concerns.       Discharge planning:   her home     Reviewed treatment plans with the patient and/or family.             Electronically signed by Fadi Carrasquillo MD on 9/30/2024 at 11:06 PM  
SW attempted to contact pts' sister Hollie. No answer voicemail full.  
SW met with patient to complete psychosocial and lifetime CSSR-S on this date. Patients long and short-term goals discussed. Patient voiced understanding. Treatment plan sheet signed. Patient verbalized understanding of the treatment plan. Patient participated in goals and objectives of the treatment plan. Patient discussed safety plan with . Discussed use of positive coping skills to reduce depression and anxiety.    In the last 6 months has the patient been a danger to self: Yes  In the last 6 months has the patient been a danger to others: No  Legal Guardian/POA: No    Activity Assessment:  Skills:  Talents:  Interests: sister    Provided patient with [employer] handout entitled \"Quitting Smoking.\"  Reviewed handout with patient: addressing dangers of smoking, developing coping skills, and providing basic information about quitting.       Patient received all components practical counseling of tobacco practical counseling during the hospital stay.        
SW spoke with pt earlier this morning about safe discharge planning, and she reported that she currently lives alone with her cat, and said that she might need help with transportation on the day of her discharge. SW told her that would not be a problem if it was needed. Pt adamantly stated that she would not follow up with [facility] because after she spent a little time recently in [facility] for inpatient tx, she reported that she followed up with [facility], and that their provider took her off all of the medicine that she had just been stabilized on while in [facility]. ABELINO suggested the pt try [facility], and she agreed. ABELINO contacted [facility] and scheduled pt's follow up appointments and then recorded them under follow up appointments in the pt's chart.   
Spiritual Health History and Assessment/Progress Note  [facility]    Loneliness/Social Isolation,  ,  , Initial Encounter    Name: Margaret Spann MRN: [id number]    Age: 33 y.o.     Sex: female   Language: English   Quaker: None   Depression with suicidal ideation     Date: 9/27/2024            Total Time Calculated: 25 min              Spiritual Assessment began in [facility]ULT [facility]        Referral/Consult From: Rounding   Encounter Overview/Reason: Loneliness/Social Isolation  Service Provided For: Patient    Angie, Belief, Meaning:   Patient Other: Pt stated - \"Not hearing from him,\" anymore through prayer and bible study.  Added \"Am mad at God.\"  Family/Friends       Importance and Influence:  Patient Other: Pt seems to know she is \"Struggling\"  at why he is silent in in her prayer/bible study, when she experienced \"broken hearts\" and having an illness she calls -Imagination\"  Said \"is not fair.\"   Family/Friends     Community:  Patient expresses feelings of isolation: disconnected from family/friends and Other: Except with lone sister and family  Family/Friends     Assessment and Plan of Care:     Patient Interventions include: Facilitated expression of thoughts and feelings and Engaged in theological reflection: She reflected on \"Not hearing from him,\" being \"mad at him\" for being silent; that it's fine to express being mad  Family/Friends Interventions include:     Patient Plan of Care: Other: Patient wanted team of providers to know that she is struggling about taking medication or not, whether is good or bad ; struggling what God has to say on the Plan of Care for her.    Family/Friends Plan of Care:     Electronically signed by JOSSELIN Radnall on 9/27/2024 at 2:16 PM        09/27/24 1408   Encounter Summary   Encounter Overview/Reason Loneliness/Social Isolation   Service Provided For Patient   Referral/Consult From [facility]   Support System Family members  (Sister)   Complexity of 
The pt's provider called ABELINO to inform her that the pt doesn't have a current PCP do to moving to a different area of town not long ago, and asked SW if we could make her an initial appointment to see a PCP somewhere close to [place], [place] where the pt resides. ABELINO spoke with pt earlier this morning to confirm that her safe discharge plan was still the same. Pt confirmed that she was still planning to go home alone and expressed that she would in fact need assistance with transportation home to [place], [place] today when she is discharged. ABELINO reminded pt that her follow up appointments were made at [facility] in [place], [place] and that the appointment information would be on her discharge paperwork. ABELINO called [facility] in [place], [place] and set pt up with an initial appointment for her to see a PCP and put the appointment information in the pt's follow up appointment section of their chart. ABELINO contacted [facility] medical transport to get transportation for the pt today when she is discharged. [facility] medical transport picked up the pt upon discharge today and transported her home.   
[facility] Adult Unit Daily Assessment  Nursing Progress Note    Room: [facility]611-01   Name: Margaret Spann   Age: 33 y.o.   Gender: female   Dx: Depression with suicidal ideation  Precautions: close watch and suicide risk  Inpatient Status: voluntary     SLEEP:  Sleep Quality Fair  Sleep Medications: Yes   PRN Sleep Meds: Yes     MEDICAL:  Other PRN Meds: Yes   Med Compliant: Yes  Accu-Chek: No  Oxygen/CPAP/BiPAP: No  CIWA/CINA: No   PAIN Assessment: none  Side Effects from medication: No    Metabolic Screening:  Lab Results   Component Value Date    LABA1C 6.5 (H) 04/21/2023     Lab Results   Component Value Date    CHOL 127 (L) 04/24/2023     Lab Results   Component Value Date    TRIG 128 04/24/2023     Lab Results   Component Value Date    HDL 31 (L) 04/24/2023     No components found for: \"LDLCAL\"  No components found for: \"LABVLDL\"  Body mass index is 40.84 kg/m².  BP Readings from Last 2 Encounters:   09/27/24 130/81   04/28/23 (!) 142/98       Medical Bed:   Is patient in a medical bed? NA   If medical bed is in use, has nursing secured room while patient is awake and out of the room? NA  Has safety checks by nursing been completed on the bed/room this shift? NA    Protective Factors:  Patient identifies protective factors with nursing staff as follows:   Identifies reasons for living: Yes   Supportive Social Network or family: Yes    Belief that suicide is immoral/high spirituality: Yes   Responsibility to family or others/living with family: Yes   Fear of death or dying due to pain and suffering: Yes   Engaged in work or school: Yes  If Patient is unable to identify, reason why?     Depression: 6   Anxiety: 4   SI passive   Risk of Suicide: No Risk  HI Negative for homicidal ideation        AVH:no If Hallucinations are present, describe?     Appetite: no change from normal   Percent Meals: 100%   Social: Yes   Speech: pressured   Appearance: appropriately dressed and healthy looking    GROUP:  Group Participation: 
normal   Appearance: appropriately dressed    GROUP:  Group Participation: Yes  Participation Quality: Minimal    Notes: Pt was cooperative with her assessment tonight. She was isolated to her room tonight and reports she was \"just tired tonight.\" She rated her depression a 4 and denies anxiety, SI, HI and AVH. Her expressions are flat. She reports she \"didn't do much today.\" No paranoia noted. No RIS noted. She was med compliant. Will continue to monitor for safety as ordered.         Electronically signed by KERRIE RIVERA RN on 9/28/24 at 10:28 PM CDT  
  Education for Fall Prevention and Restraints given: yes    Patient signed all legal documents yes   Pt verbalizes understanding:yes       Medical:  Order for Medical H&P placed? yes    Was medical provider notified? no      Electronically signed by BEBE RUFF RN on 9/27/24 at 1:10 AM CDT  
Never smoker

## 2024-10-01 NOTE — TELEPHONE ENCOUNTER
SW attempted to follow up with pt after her discharge from the unit yesterday, but it went to voicemail. SW left a message, requesting a call back with any questions or concerns she may have. SW attempted to call pt a second time, but still got no answer.

## 2025-03-10 ENCOUNTER — PATIENT ROUNDING (BHMG ONLY) (OUTPATIENT)
Dept: FAMILY MEDICINE CLINIC | Facility: CLINIC | Age: 35
End: 2025-03-10
Payer: COMMERCIAL

## 2025-03-10 ENCOUNTER — LAB (OUTPATIENT)
Dept: LAB | Facility: HOSPITAL | Age: 35
End: 2025-03-10
Payer: COMMERCIAL

## 2025-03-10 ENCOUNTER — OFFICE VISIT (OUTPATIENT)
Dept: FAMILY MEDICINE CLINIC | Facility: CLINIC | Age: 35
End: 2025-03-10
Payer: COMMERCIAL

## 2025-03-10 VITALS
TEMPERATURE: 97.3 F | DIASTOLIC BLOOD PRESSURE: 101 MMHG | SYSTOLIC BLOOD PRESSURE: 130 MMHG | HEIGHT: 66 IN | OXYGEN SATURATION: 98 % | WEIGHT: 256.4 LBS | BODY MASS INDEX: 41.21 KG/M2 | HEART RATE: 77 BPM

## 2025-03-10 DIAGNOSIS — Z11.59 ENCOUNTER FOR HEPATITIS C SCREENING TEST FOR LOW RISK PATIENT: ICD-10-CM

## 2025-03-10 DIAGNOSIS — E11.65 TYPE 2 DIABETES MELLITUS WITH HYPERGLYCEMIA, WITHOUT LONG-TERM CURRENT USE OF INSULIN: Primary | ICD-10-CM

## 2025-03-10 DIAGNOSIS — I10 ESSENTIAL HYPERTENSION: ICD-10-CM

## 2025-03-10 DIAGNOSIS — F51.01 PRIMARY INSOMNIA: ICD-10-CM

## 2025-03-10 DIAGNOSIS — F25.0 SCHIZOAFFECTIVE DISORDER, BIPOLAR TYPE: ICD-10-CM

## 2025-03-10 DIAGNOSIS — E11.65 TYPE 2 DIABETES MELLITUS WITH HYPERGLYCEMIA, WITHOUT LONG-TERM CURRENT USE OF INSULIN: ICD-10-CM

## 2025-03-10 DIAGNOSIS — E66.01 CLASS 3 SEVERE OBESITY DUE TO EXCESS CALORIES WITH SERIOUS COMORBIDITY AND BODY MASS INDEX (BMI) OF 40.0 TO 44.9 IN ADULT: ICD-10-CM

## 2025-03-10 DIAGNOSIS — H61.21 CERUMEN DEBRIS ON TYMPANIC MEMBRANE OF RIGHT EAR: ICD-10-CM

## 2025-03-10 DIAGNOSIS — E66.813 CLASS 3 SEVERE OBESITY DUE TO EXCESS CALORIES WITH SERIOUS COMORBIDITY AND BODY MASS INDEX (BMI) OF 40.0 TO 44.9 IN ADULT: ICD-10-CM

## 2025-03-10 LAB
ALBUMIN SERPL-MCNC: 4.8 G/DL (ref 3.5–5.2)
ALBUMIN UR-MCNC: 4.3 MG/DL
ALBUMIN/GLOB SERPL: 1.8 G/DL
ALP SERPL-CCNC: 88 U/L (ref 39–117)
ALT SERPL W P-5'-P-CCNC: 26 U/L (ref 1–33)
ANION GAP SERPL CALCULATED.3IONS-SCNC: 12 MMOL/L (ref 5–15)
AST SERPL-CCNC: 23 U/L (ref 1–32)
BACTERIA UR QL AUTO: ABNORMAL /HPF
BASOPHILS # BLD AUTO: 0.02 10*3/MM3 (ref 0–0.2)
BASOPHILS NFR BLD AUTO: 0.3 % (ref 0–1.5)
BILIRUB SERPL-MCNC: 0.5 MG/DL (ref 0–1.2)
BILIRUB UR QL STRIP: NEGATIVE
BUN SERPL-MCNC: 12 MG/DL (ref 6–20)
BUN/CREAT SERPL: 21.4 (ref 7–25)
CALCIUM SPEC-SCNC: 9.9 MG/DL (ref 8.6–10.5)
CHLORIDE SERPL-SCNC: 108 MMOL/L (ref 98–107)
CHOLEST SERPL-MCNC: 163 MG/DL (ref 0–200)
CLARITY UR: CLEAR
CO2 SERPL-SCNC: 22 MMOL/L (ref 22–29)
COLOR UR: YELLOW
CREAT SERPL-MCNC: 0.56 MG/DL (ref 0.57–1)
CREAT UR-MCNC: 303.6 MG/DL
DEPRECATED RDW RBC AUTO: 41.6 FL (ref 37–54)
EGFRCR SERPLBLD CKD-EPI 2021: 124.5 ML/MIN/1.73
EOSINOPHIL # BLD AUTO: 0.02 10*3/MM3 (ref 0–0.4)
EOSINOPHIL NFR BLD AUTO: 0.3 % (ref 0.3–6.2)
ERYTHROCYTE [DISTWIDTH] IN BLOOD BY AUTOMATED COUNT: 12.5 % (ref 12.3–15.4)
GLOBULIN UR ELPH-MCNC: 2.7 GM/DL
GLUCOSE SERPL-MCNC: 184 MG/DL (ref 65–99)
GLUCOSE UR STRIP-MCNC: ABNORMAL MG/DL
HBA1C MFR BLD: 6.9 % (ref 4.8–5.6)
HCT VFR BLD AUTO: 42.8 % (ref 34–46.6)
HCV AB SER QL: NORMAL
HDLC SERPL-MCNC: 34 MG/DL (ref 40–60)
HGB BLD-MCNC: 14.1 G/DL (ref 12–15.9)
HGB UR QL STRIP.AUTO: NEGATIVE
HYALINE CASTS UR QL AUTO: ABNORMAL /LPF
IMM GRANULOCYTES # BLD AUTO: 0.02 10*3/MM3 (ref 0–0.05)
IMM GRANULOCYTES NFR BLD AUTO: 0.3 % (ref 0–0.5)
KETONES UR QL STRIP: ABNORMAL
LDLC SERPL CALC-MCNC: 106 MG/DL (ref 0–100)
LDLC/HDLC SERPL: 3.03 {RATIO}
LEUKOCYTE ESTERASE UR QL STRIP.AUTO: ABNORMAL
LYMPHOCYTES # BLD AUTO: 1.73 10*3/MM3 (ref 0.7–3.1)
LYMPHOCYTES NFR BLD AUTO: 25.9 % (ref 19.6–45.3)
MCH RBC QN AUTO: 29.7 PG (ref 26.6–33)
MCHC RBC AUTO-ENTMCNC: 32.9 G/DL (ref 31.5–35.7)
MCV RBC AUTO: 90.3 FL (ref 79–97)
MICROALBUMIN/CREAT UR: 14.2 MG/G (ref 0–29)
MONOCYTES # BLD AUTO: 0.36 10*3/MM3 (ref 0.1–0.9)
MONOCYTES NFR BLD AUTO: 5.4 % (ref 5–12)
NEUTROPHILS NFR BLD AUTO: 4.54 10*3/MM3 (ref 1.7–7)
NEUTROPHILS NFR BLD AUTO: 67.8 % (ref 42.7–76)
NITRITE UR QL STRIP: NEGATIVE
NRBC BLD AUTO-RTO: 0 /100 WBC (ref 0–0.2)
PH UR STRIP.AUTO: <=5 [PH] (ref 5–8)
PLATELET # BLD AUTO: 223 10*3/MM3 (ref 140–450)
PMV BLD AUTO: 11.3 FL (ref 6–12)
POTASSIUM SERPL-SCNC: 4.3 MMOL/L (ref 3.5–5.2)
PROT SERPL-MCNC: 7.5 G/DL (ref 6–8.5)
PROT UR QL STRIP: ABNORMAL
RBC # BLD AUTO: 4.74 10*6/MM3 (ref 3.77–5.28)
RBC # UR STRIP: ABNORMAL /HPF
REF LAB TEST METHOD: ABNORMAL
SODIUM SERPL-SCNC: 142 MMOL/L (ref 136–145)
SP GR UR STRIP: >1.03 (ref 1–1.03)
SQUAMOUS #/AREA URNS HPF: ABNORMAL /HPF
TRIGL SERPL-MCNC: 130 MG/DL (ref 0–150)
TSH SERPL DL<=0.05 MIU/L-ACNC: 0.48 UIU/ML (ref 0.27–4.2)
UROBILINOGEN UR QL STRIP: ABNORMAL
VLDLC SERPL-MCNC: 23 MG/DL (ref 5–40)
WBC # UR STRIP: ABNORMAL /HPF
WBC NRBC COR # BLD AUTO: 6.69 10*3/MM3 (ref 3.4–10.8)

## 2025-03-10 PROCEDURE — 1126F AMNT PAIN NOTED NONE PRSNT: CPT

## 2025-03-10 PROCEDURE — 99214 OFFICE O/P EST MOD 30 MIN: CPT

## 2025-03-10 PROCEDURE — 36415 COLL VENOUS BLD VENIPUNCTURE: CPT

## 2025-03-10 PROCEDURE — 82570 ASSAY OF URINE CREATININE: CPT

## 2025-03-10 PROCEDURE — 87086 URINE CULTURE/COLONY COUNT: CPT

## 2025-03-10 PROCEDURE — 3080F DIAST BP >= 90 MM HG: CPT

## 2025-03-10 PROCEDURE — 80061 LIPID PANEL: CPT

## 2025-03-10 PROCEDURE — 83036 HEMOGLOBIN GLYCOSYLATED A1C: CPT

## 2025-03-10 PROCEDURE — 84443 ASSAY THYROID STIM HORMONE: CPT

## 2025-03-10 PROCEDURE — 85025 COMPLETE CBC W/AUTO DIFF WBC: CPT

## 2025-03-10 PROCEDURE — 86803 HEPATITIS C AB TEST: CPT

## 2025-03-10 PROCEDURE — 80053 COMPREHEN METABOLIC PANEL: CPT

## 2025-03-10 PROCEDURE — 81001 URINALYSIS AUTO W/SCOPE: CPT

## 2025-03-10 PROCEDURE — 82043 UR ALBUMIN QUANTITATIVE: CPT

## 2025-03-10 PROCEDURE — 3075F SYST BP GE 130 - 139MM HG: CPT

## 2025-03-10 PROCEDURE — 1159F MED LIST DOCD IN RCRD: CPT

## 2025-03-10 PROCEDURE — 1160F RVW MEDS BY RX/DR IN RCRD: CPT

## 2025-03-10 RX ORDER — LISINOPRIL 20 MG/1
20 TABLET ORAL DAILY
Qty: 30 TABLET | Refills: 0 | Status: SHIPPED | OUTPATIENT
Start: 2025-03-10

## 2025-03-10 RX ORDER — GLUCAGON INJECTION, SOLUTION 1 MG/.2ML
1 INJECTION, SOLUTION SUBCUTANEOUS ONCE AS NEEDED
Qty: 0.4 ML | Refills: 1 | Status: CANCELLED | OUTPATIENT
Start: 2025-03-10

## 2025-03-10 RX ORDER — PRAZOSIN HYDROCHLORIDE 1 MG/1
1 CAPSULE ORAL NIGHTLY
Qty: 30 CAPSULE | Refills: 0 | Status: SHIPPED | OUTPATIENT
Start: 2025-03-10

## 2025-03-10 RX ORDER — LURASIDONE HYDROCHLORIDE 40 MG/1
TABLET, FILM COATED ORAL
Qty: 34 TABLET | Refills: 0 | Status: SHIPPED | OUTPATIENT
Start: 2025-03-10 | End: 2025-04-16

## 2025-03-10 NOTE — PROGRESS NOTES
March 10, 2025    Hello, may I speak with Emili Caal?    My name is JUAN       I am  with [facility]  [address] [address] [phone].    Before we get started may I verify your date of birth? 1990    I am calling to officially welcome you to our practice and ask about your recent visit. Is this a good time to talk? yes    Tell me about your visit with us. What things went well?  FINE VISIT       We're always looking for ways to make our patients' experiences even better. Do you have recommendations on ways we may improve?  no    Overall were you satisfied with your first visit to our practice? yes       I appreciate you taking the time to speak with me today. Is there anything else I can do for you? no      Thank you, and have a great day.

## 2025-03-10 NOTE — PROGRESS NOTES
Lesly Gonsalez,   [facility]   Family Medicine  [address]  [place] [address]  Phone: [phone]  Fax: [phone]         Chief Complaint:  Chief Complaint   Patient presents with    Establish Care     Patient presents to clinic to establish care. Patient states she has had elevated blood pressure. Patient has concerns about possible bladder infection. States she has had one in the past and had zero symptoms. Patient reports zero symptoms today.   Patient has been to [facility]  Patient reports she has been without all of her medications for 3 months    Insomnia     Patient states she is unable to sleep due to hearing people talk to her         History:  Emili Caal is a 34 y.o. female who presents today as a new patient to the clinic. Current concerns include:     Living with aunt. Waiting on disability appointment soon.     Mental Health  Has been struggling with mental health for as long as she is known.  Has multiple diagnoses, including bipolar, schizoaffective disorder, avoidant personality.  She believes her dad was schizophrenic.  She reports most recently, receiving care at [facility] in September 2024.  At that time she was started on Latuda, Lexapro, Vyvanse, doxepin, and Valium.  She was also given gabapentin, but did not take this medication.  She was given 1 month supply, but then due to inability to find a physician has been off of medications for the last 4 months.  She is wishing to get back on multiple medications.  She has been struggling with depression, but also is hearing people talk.  She states this has been ongoing for about 2 to 3 years, but usually the Latuda helps with this.  She struggles with sleep due to the talking as well as history of nightmares and insomnia.  She states the voices are not often meeting, but do tend to bring her down.  They sometimes talk about how it was patient's fault that her mother committed suicide.  She  "states it feels like these are a invasion of her privacy.  She does have a history of a suicide attempt with overdose, but denies any plan for suicide today.  He does feel lonely, but states her cat Paresh Melissa helps get her through.    She does complain of some right ear discomfort.  Feels like it is \"full\".  Denies any decreased hearing or fevers.  No other upper respiratory symptoms.    Type 2 DM  Not taking any diabetes medications.  Has previously been on metformin, which she states hurt her liver due to fatty liver.  She was also on a steglatro, but had adverse reaction which landed her in the hospital in DKA. also required insulin, but states she is scared of insulin and would rather not be on this if that is a possibility.  3 years ago A1c was 13, 2 years ago 6.7.  She has not taken her blood sugar recently.  She is not currently taking a statin.    Hypertension  Has previously taken lisinopril and clonidine for blood pressure management.  Has not taken anything for multiple months, and does feel increased headaches.    HPI        Past Medical History:   Past Medical History:   Diagnosis Date    Allergic     Anxiety     Asthma     Chronic diarrhea     Depression     Diabetes mellitus     High blood pressure     Hyperlipidemia     Liver disease     PTSD (post-traumatic stress disorder)     Sepsis, unspecified organism 10/4/2021       Past Surgical History:   Past Surgical History:   Procedure Laterality Date    ADENOIDECTOMY      EAR TUBES  2015       Family History:   Family History   Problem Relation Age of Onset    Liver disease Mother     Alcohol abuse Mother     Hypertension Mother     Dementia Mother     Liver disease Father     Alcohol abuse Father     Hypertension Father     Diabetes Sister     Alcohol abuse Brother     Cancer Paternal Grandmother     Diabetes Paternal Grandmother     Hypertension Paternal Grandmother        Social History:    reports that she has been smoking cigarettes. She has " "never used smokeless tobacco. She reports that she does not currently use drugs. She reports that she does not drink alcohol.    Current Medications:   Current Outpatient Medications   Medication Instructions    citalopram (CELEXA) 20 mg, Daily    lisinopril (PRINIVIL,ZESTRIL) 20 mg, Oral, Daily    lurasidone (Latuda) 40 MG tablet tablet Take 0.5 tablets by mouth Daily for 7 days, THEN 1 tablet Daily for 30 days.    prazosin (MINIPRESS) 1 mg, Oral, Nightly       Allergies:   Allergies   Allergen Reactions    Hydroxyquinolines Other (See Comments)     Passed out and was dizzy    Hydroxyzine Other (See Comments)     Passed out       OBJECTIVE:  BP (!) 130/101 (BP Location: Right arm, Patient Position: Sitting, Cuff Size: Adult)   Pulse 77   Temp 97.3 °F (36.3 °C) (Temporal)   Ht 167.6 cm (66\")   Wt 116 kg (256 lb 6.4 oz)   SpO2 98%   BMI 41.38 kg/m²      Gen: Well developed, well nourished female in no acute distress.  HEENT: Normocephalic. TM grey and pearly bilaterally. Normal external ear, nose. Oral mucosa is moist and pink. Posterior pharynx without erythema or exudate. PERRLA. Conjunctiva non injected.   Neck: Full range of motion. No lymphadenopathy. No masses or lesions noted.   Resp: Lungs are clear to auscultation bilaterally, no wheeze.  Normal respiratory effort.  CV: Regular rate and rhythm, no murmurs.  Abd: Soft, nontender.  Bowel sounds present.  No masses.  Neuro: Alert and moe obstructed on the right.  TM grey and pearly on the left.  Procedures    Assessment/Plan:     Diagnoses and all orders for this visit:    1. Type 2 diabetes mellitus with hyperglycemia, without long-term current use of insulin (Primary)  2. Class 3 severe obesity due to excess calories with serious comorbidity and body mass index (BMI) of 40.0 to 44.9 in adult  Most recent A1c 2 years ago was 6.7.  Has not been on any diabetic medications recently.  History of DKA.  She is concerned about her binge eating escalating to " uncontrolled diabetes, and therefore DKA.  Has been on Vyvanse previously for binge eating as well as ADHD-plan to hold for now due to uncontrolled blood pressure.  If A1c elevated, consider GLP-1 for weight and blood sugar control.  She is not currently on a statin, has previously been.  Will address at next visit.  -     Hemoglobin A1c; Future  -     TSH; Future  -     Comprehensive Metabolic Panel; Future  -     Lipid Panel; Future  -     CBC Auto Differential; Future  -     Microalbumin / Creatinine Urine Ratio - Urine, Clean Catch; Future  -     Urinalysis With Culture If Indicated -; Future    3. Essential hypertension  Uncontrolled.  Previously on lisinopril/clonidine.  Will start 20 of lisinopril, get baseline kidney function.  Plan to recheck in 2 to 4 weeks.  -     lisinopril (PRINIVIL,ZESTRIL) 20 MG tablet; Take 1 tablet by mouth Daily.  Dispense: 30 tablet; Refill: 0  -     Blood Pressure Device    4. Schizoaffective disorder, bipolar type  5. Primary insomnia  Uncontrolled.  History of polypharmacy.  Discussed starting back on Latuda, which seems to help her voices the most.  Believe prazosin to help with both nightmares, insomnia, as well as blood pressure.  Will need to get her in with psychiatry for further evaluation.  -     Ambulatory Referral to Psychiatry  -     lurasidone (Latuda) 40 MG tablet tablet; Take 0.5 tablets by mouth Daily for 7 days, THEN 1 tablet Daily for 30 days.  Dispense: 34 tablet; Refill: 0  -     prazosin (MINIPRESS) 1 MG capsule; Take 1 capsule by mouth Every Night.  Dispense: 30 capsule; Refill: 0    6. Cerumen debris on tympanic membrane of right ear  Lavage performed on the right with significant improvement.  Minimal fluid behind the ear.  Encouraged if symptoms worsened to call, could develop into an infection.  -     Ear Cerumen Removal    7. Encounter for hepatitis C screening test for low risk patient  -     Hepatitis C antibody; Future      An After Visit Summary was  printed and given to the patient at discharge.  Return in about 4 weeks (around 4/7/2025).         Lesly Gonsalez,   3/10/2025   Electronically signed.

## 2025-03-11 ENCOUNTER — RESULTS FOLLOW-UP (OUTPATIENT)
Dept: FAMILY MEDICINE CLINIC | Facility: CLINIC | Age: 35
End: 2025-03-11
Payer: COMMERCIAL

## 2025-03-11 LAB — BACTERIA SPEC AEROBE CULT: NO GROWTH

## 2025-03-18 ENCOUNTER — TELEPHONE (OUTPATIENT)
Dept: FAMILY MEDICINE CLINIC | Facility: CLINIC | Age: 35
End: 2025-03-18
Payer: COMMERCIAL

## 2025-03-18 NOTE — TELEPHONE ENCOUNTER
Caller: Emili Caal    Relationship: Self    Best call back number:  [phone] - OKAY TO LEAVE A MESSAGE WITH KAY ALVAREZ    What test/procedure requested:     MEDICATION - PRIOR AUTHORIZATION    Semaglutide,0.25 or 0.5MG/DOS, (OZEMPIC) 2 MG/1.5ML solution pen-injector     When is it needed:     ASAP

## 2025-04-04 DIAGNOSIS — I10 ESSENTIAL HYPERTENSION: ICD-10-CM

## 2025-04-04 DIAGNOSIS — F25.0 SCHIZOAFFECTIVE DISORDER, BIPOLAR TYPE: ICD-10-CM

## 2025-04-04 DIAGNOSIS — F51.01 PRIMARY INSOMNIA: ICD-10-CM

## 2025-04-04 RX ORDER — LISINOPRIL 20 MG/1
20 TABLET ORAL DAILY
Qty: 30 TABLET | Refills: 0 | Status: SHIPPED | OUTPATIENT
Start: 2025-04-04 | End: 2025-04-05 | Stop reason: SDUPTHER

## 2025-04-04 RX ORDER — CITALOPRAM HYDROBROMIDE 20 MG/1
20 TABLET ORAL DAILY
Qty: 90 TABLET | Refills: 2 | Status: SHIPPED | OUTPATIENT
Start: 2025-04-04 | End: 2025-04-05 | Stop reason: SDUPTHER

## 2025-04-04 RX ORDER — PRAZOSIN HYDROCHLORIDE 1 MG/1
1 CAPSULE ORAL NIGHTLY
Qty: 30 CAPSULE | Refills: 0 | Status: SHIPPED | OUTPATIENT
Start: 2025-04-04 | End: 2025-04-05 | Stop reason: SDUPTHER

## 2025-04-04 RX ORDER — LURASIDONE HYDROCHLORIDE 40 MG/1
TABLET, FILM COATED ORAL
Qty: 34 TABLET | Refills: 0 | Status: SHIPPED | OUTPATIENT
Start: 2025-04-04 | End: 2025-04-05 | Stop reason: SDUPTHER

## 2025-04-05 DIAGNOSIS — F51.01 PRIMARY INSOMNIA: ICD-10-CM

## 2025-04-05 DIAGNOSIS — F25.0 SCHIZOAFFECTIVE DISORDER, BIPOLAR TYPE: ICD-10-CM

## 2025-04-05 DIAGNOSIS — I10 ESSENTIAL HYPERTENSION: ICD-10-CM

## 2025-04-07 RX ORDER — LISINOPRIL 20 MG/1
20 TABLET ORAL DAILY
Qty: 30 TABLET | Refills: 0 | Status: SHIPPED | OUTPATIENT
Start: 2025-04-07

## 2025-04-07 RX ORDER — PRAZOSIN HYDROCHLORIDE 1 MG/1
1 CAPSULE ORAL NIGHTLY
Qty: 30 CAPSULE | Refills: 0 | Status: SHIPPED | OUTPATIENT
Start: 2025-04-07 | End: 2025-04-14

## 2025-04-07 RX ORDER — CITALOPRAM HYDROBROMIDE 20 MG/1
20 TABLET ORAL DAILY
Qty: 90 TABLET | Refills: 0 | Status: SHIPPED | OUTPATIENT
Start: 2025-04-07 | End: 2025-04-14

## 2025-04-07 RX ORDER — LURASIDONE HYDROCHLORIDE 40 MG/1
TABLET, FILM COATED ORAL
Qty: 34 TABLET | Refills: 0 | Status: SHIPPED | OUTPATIENT
Start: 2025-04-07 | End: 2025-04-14

## 2025-04-11 NOTE — PROGRESS NOTES
" Lesly Gonsalez DO  [facility]   Family Medicine  [address]  [place] [id number]  Phone: [phone]  Fax: [phone]         Chief Complaint:  Chief Complaint   Patient presents with    patient hasnt been sleeping        History:  Emili Caal is a 34 y.o. female who presents for patient follow-up.    Diabetes  Last A1c 6.9.  Started on Ozempic.  She has gotten only a couple doses, but reports no benefit. She has gained weight since our last visit.     Mood  Placed referral to psychiatry at last visit, and restarted her Latuda.  Added on sleeping medication with prazosin. Patient very demanding today regarding medication changes.  Last visit, she requested Latuda, Lexapro, Vyvanse, Valium.  History of overdose on Ambien.  Restarted on her on slightly lower dose Latuda, in addition to prazosin.  She states these medicines are not strong enough, and are requesting her \"normal\" medications.  Throughout the visit, she states she is not hearing voices, because \"everyone can hear the voices\". She does have an upcoming appointment with psychiatry 4/30.    Blood pressure  Elevated blood pressure at last visit.  Restarted patient on lisinopril.  Felt like this is doing well.    HPI           reports that she has been smoking cigarettes. She started smoking about 15 years ago. She has a 3.8 pack-year smoking history. She has never used smokeless tobacco. She reports that she does not currently use drugs. She reports that she does not drink alcohol.    Current Outpatient Medications   Medication Instructions    atorvastatin (LIPITOR) 20 mg, Oral, Daily    escitalopram (LEXAPRO) 10 mg, Oral, Daily    lisinopril (PRINIVIL,ZESTRIL) 20 mg, Oral, Daily    lurasidone HCl (LATUDA) 60 mg, Oral, Daily    prazosin (MINIPRESS) 2 mg, Oral, Nightly    Semaglutide(0.25 or 0.5MG/DOS) (OZEMPIC) 0.5 mg, Subcutaneous, Weekly       OBJECTIVE:  /70   Pulse 89   Temp 97.4 °F (36.3 °C)   Ht 167.6 cm " Past Medical History:   Diagnosis Date    Anemia     Anxiety     Bronchitis     Enlarged heart     Hyperlipidemia     Hypertension        Past Surgical History:   Procedure Laterality Date    TUBAL LIGATION      VAGINAL PROLAPSE REPAIR  09/10/2013    Uterus prolapse/ [facility]       Review of patient's allergies indicates:  No Known Allergies    Current Facility-Administered Medications   Medication Dose Route Frequency Provider Last Rate Last Admin    acetaminophen suppository 650 mg  650 mg Rectal Q4H PRN Kavon Maxwell NP        acetaminophen tablet 650 mg  650 mg Oral Q8H PRN Kavon Maxwell NP        aluminum-magnesium hydroxide-simethicone 200-200-20 mg/5 mL suspension 30 mL  30 mL Oral QID PRN Kavon Maxwell NP        dextrose 10% bolus 125 mL 125 mL  12.5 g Intravenous PRN Kavon Maxwell NP        dextrose 10% bolus 250 mL 250 mL  25 g Intravenous PRN Kavon Maxwell NP        enoxaparin injection 40 mg  40 mg Subcutaneous Daily Kavon Maxwell NP   40 mg at 04/18/24 2319    glucagon (human recombinant) injection 1 mg  1 mg Intramuscular PRN Kavon Maxwell NP        glucose chewable tablet 16 g  16 g Oral PRN Kavon Maxwell NP        glucose chewable tablet 24 g  24 g Oral PRN Kavon Maxwell NP        hydrALAZINE tablet 50 mg  50 mg Oral Q8H Jerica Quick NP        hydroCHLOROthiazide tablet 25 mg  25 mg Oral Daily Kavon Maxwell NP   25 mg at 04/19/24 0751    HYDROcodone-acetaminophen 5-325 mg per tablet 1 tablet  1 tablet Oral Q6H PRN Kavon Maxwell NP        losartan tablet 100 mg  100 mg Oral Daily Kavon Maxwell NP   100 mg at 04/19/24 0751    melatonin tablet 6 mg  6 mg Oral Nightly PRN Kavon Maxwell NP        morphine injection 2 mg  2 mg Intravenous Q4H PRN Kavon Maxwell NP        naloxone 0.4 mg/mL injection 0.02 mg  0.02 mg Intravenous PRN Kavon Maxwell NP        ondansetron injection 4 mg  4 mg Intravenous Q8H PRN  "(66\")   Wt 119 kg (263 lb)   SpO2 96%   BMI 42.45 kg/m²      Gen: No acute distress. Does become agitated during encounter.   Head and neck: Normocephalic, atraumatic.  HEENT: PERRLA, EOMI.  CV: Well perfused, no pallor.   Resp: Normal respiratory effort. No distress.   Musculoskeletal: No gross deformity.   Neuro: Alert and oriented.   Skin: No visible rash or erythema.   Psych: Appropriate.       Procedures    Assessment/Plan:     Diagnoses and all orders for this visit:    1. Type 2 diabetes mellitus with hyperglycemia, without long-term current use of insulin (Primary)  -     Semaglutide,0.25 or 0.5MG/DOS, (OZEMPIC) 2 MG/1.5ML solution pen-injector; Inject 0.5 mg under the skin into the appropriate area as directed 1 (One) Time Per Week.  Dispense: 2 mL; Refill: 1    2. Class 3 severe obesity due to excess calories with serious comorbidity and body mass index (BMI) of 40.0 to 44.9 in adult    3. Schizoaffective disorder, bipolar type  Throughout encounter today, patient became very irate.  Stating multiple times that she needs her medications (specifically Vyvanse and valium).  She does also vaguely refer to \"not being here\" in a couple weeks, but when asked to clarify she declines. She also admitted to hearing voices, but then saying, \"I know you hear them too\".  After lengthy discussion, we will plan to increase her Latuda to 60 mg, as that was she was on at last visit as well as adding lower dose Lexapro.  Discussed that I would not be able to provide Vyvanse, Valium for her, that she needed to see psychiatry- has appointment 4/30.   -     escitalopram (Lexapro) 10 MG tablet; Take 1 tablet by mouth Daily.  Dispense: 30 tablet; Refill: 0  -     lurasidone HCl (Latuda) 60 MG tablet tablet; Take 1 tablet by mouth Daily.  Dispense: 30 tablet; Refill: 2    4. Primary insomnia  Started patient on prazosin, will increase today as she has had little medicine benefit.  She has previously been on Ambien (hx of " Kavon Maxwell NP        polyethylene glycol packet 17 g  17 g Oral Daily PRN Kavon Maxwell NP        promethazine tablet 25 mg  25 mg Oral Q6H PRN Kavon Maxwell NP        simethicone chewable tablet 80 mg  1 tablet Oral QID PRN Kavon Maxwell NP        sodium chloride 0.9% flush 3 mL  3 mL Intravenous Q12H PRN Kavon Maxwell NP         Family History       Problem Relation (Age of Onset)    Stroke Brother          Tobacco Use    Smoking status: Never    Smokeless tobacco: Never   Substance and Sexual Activity    Alcohol use: Yes     Comment: occasional     Drug use: No    Sexual activity: Not Currently     Review of Systems   Constitutional: Positive for malaise/fatigue.   HENT: Negative.     Eyes: Negative.    Cardiovascular:  Positive for syncope.   Respiratory: Negative.     Endocrine: Negative.    Hematologic/Lymphatic: Negative.    Skin: Negative.    Musculoskeletal:  Positive for arthritis and joint pain.   Gastrointestinal: Negative.    Genitourinary: Negative.    Psychiatric/Behavioral: Negative.     Allergic/Immunologic: Negative.      Objective:     Vital Signs (Most Recent):  Temp: 97.1 °F (36.2 °C) (04/19/24 0744)  Pulse: (!) 54 (04/19/24 1202)  Resp: 17 (04/19/24 0744)  BP: (!) 156/68 (04/19/24 1202)  SpO2: 96 % (04/19/24 1202) Vital Signs (24h Range):  Temp:  [97.1 °F (36.2 °C)-98.1 °F (36.7 °C)] 97.1 °F (36.2 °C)  Pulse:  [48-69] 54  Resp:  [13-21] 17  SpO2:  [96 %-100 %] 96 %  BP: (127-198)/() 156/68     Weight: 62.6 kg (138 lb)  Body mass index is 25.24 kg/m².    SpO2: 96 %         Intake/Output Summary (Last 24 hours) at 4/19/2024 1230  Last data filed at 4/19/2024 0852  Gross per 24 hour   Intake 0 ml   Output 200 ml   Net -200 ml       Lines/Drains/Airways       Peripheral Intravenous Line  Duration                  Peripheral IV - Single Lumen 04/18/24 1400 22 G Posterior;Right Hand <1 day         Peripheral IV - Single Lumen 04/18/24 1623 20 G Left Antecubital  <1 day                     Physical Exam  Vitals and nursing note reviewed.   Constitutional:       General: She is not in acute distress.     Appearance: She is well-developed. She is not diaphoretic.   HENT:      Head: Normocephalic and atraumatic.   Eyes:      General:         Right eye: No discharge.         Left eye: No discharge.      Pupils: Pupils are equal, round, and reactive to light.   Cardiovascular:      Rate and Rhythm: Normal rate and regular rhythm.      Heart sounds: Normal heart sounds, S1 normal and S2 normal. No murmur heard.  Pulmonary:      Effort: Pulmonary effort is normal. No respiratory distress.      Breath sounds: Normal breath sounds. No wheezing.   Abdominal:      General: There is no distension.   Musculoskeletal:      Right lower leg: No edema.      Left lower leg: No edema.   Skin:     General: Skin is warm and dry.      Findings: No erythema.   Neurological:      General: No focal deficit present.      Mental Status: She is alert and oriented to person, place, and time.   Psychiatric:         Mood and Affect: Mood normal.         Behavior: Behavior normal.          Significant Labs: CMP   Recent Labs   Lab 04/18/24  1407      K 4.8      CO2 24   GLU 82   BUN 21   CREATININE 1.0   CALCIUM 9.5   PROT 7.7   ALBUMIN 3.9   BILITOT 0.3   ALKPHOS 39*   AST 22   ALT 14   ANIONGAP 11   , CBC   Recent Labs   Lab 04/18/24  1407   WBC 5.05   HGB 10.8*   HCT 34.4*      , Troponin   Recent Labs   Lab 04/18/24  1407 04/18/24  1621   TROPONINI <0.006 <0.006   , and All pertinent lab results from the last 24 hours have been reviewed.    Significant Imaging: Echocardiogram: Transthoracic echo (TTE) complete (Cupid Only):   Results for orders placed or performed during the hospital encounter of 04/18/24   Echo   Result Value Ref Range    BSA 1.65 m2    LVOT stroke volume 73.24 cm3    LVIDd 4.15 3.5 - 6.0 cm    LV Systolic Volume 34.72 mL    LV Systolic Volume Index 21.3 mL/m2     overdose), Valium for this.   -     prazosin (MINIPRESS) 2 MG capsule; Take 1 capsule by mouth Every Night.  Dispense: 30 capsule; Refill: 0    5. Essential hypertension  Started 20 mg lisinopril at last visit. BP stable. Will recheck kidney function to assure no adverse reaction.  -     Basic metabolic panel; Future    6. Mixed hyperlipidemia  -     atorvastatin (LIPITOR) 20 MG tablet; Take 1 tablet by mouth Daily.  Dispense: 90 tablet; Refill: 3        An After Visit Summary was printed and given to the patient at discharge.  Return in about 4 weeks (around 5/12/2025).         Lesly Gonsalez DO  4/14/2025   Electronically signed.   LVIDs 2.99 2.1 - 4.0 cm    LV Diastolic Volume 76.52 mL    LV Diastolic Volume Index 46.94 mL/m2    IVS 1.28 (A) 0.6 - 1.1 cm    LVOT diameter 1.98 cm    LVOT area 3.1 cm2    FS 28 28 - 44 %    Left Ventricle Relative Wall Thickness 0.57 cm    Posterior Wall 1.19 (A) 0.6 - 1.1 cm    LV mass 182.51 g    LV Mass Index 112 g/m2    MV Peak E Slim 0.84 m/s    TDI LATERAL 0.09 m/s    TDI SEPTAL 0.06 m/s    E/E' ratio 11.20 m/s    MV Peak A Slim 0.72 m/s    TR Max Slim 2.64 m/s    E/A ratio 1.17     IVRT 74.22 msec    E wave deceleration time 167.02 msec    LV SEPTAL E/E' RATIO 14.00 m/s    LV LATERAL E/E' RATIO 9.33 m/s    LVOT peak slim 1.04 m/s    Left Ventricular Outflow Tract Mean Velocity 0.81 cm/s    Left Ventricular Outflow Tract Mean Gradient 2.73 mmHg    RVOT peak VTI 22.3 cm    TAPSE 2.40 cm    LA size 3.25 cm    Left Atrium Minor Axis 5.26 cm    Left Atrium Major Axis 5.11 cm    RA Major Axis 4.73 cm    AV regurgitation pressure 1/2 time [id number] ms    AR Max Slim 3.58 m/s    AV mean gradient 6 mmHg    AV peak gradient 12 mmHg    Ao peak slim 1.72 m/s    Ao VTI 38.10 cm    LVOT peak VTI 23.80 cm    AV valve area 1.92 cm²    AV Velocity Ratio 0.60     AV index (prosthetic) 0.62     SHARIF by Velocity Ratio 1.86 cm²    Mr max slim 4.51 m/s    MV stenosis pressure 1/2 time 48.43 ms    MV valve area p 1/2 method 4.54 cm2    TV mean gradient 24 mmHg    Triscuspid Valve Regurgitation Peak Gradient 28 mmHg    PV mean gradient 2 mmHg    PV peak gradient 3     RVOT peak slim 0.84 m/s    Ao root annulus 3.21 cm    STJ 3.30 cm    Ascending aorta 2.77 cm    IVC diameter 1.58 cm    Mean e' 0.08 m/s    ZLVIDS 0.38     ZLVIDD -1.03     LA Volume Index 35.1 mL/m2    LA volume 57.28 cm3    LA WIDTH 4.0 cm    RA Width 2.6 cm   , EKG: Reviewed, and X-Ray: CXR: X-Ray Chest 1 View (CXR): No results found for this visit on 04/18/24. and X-Ray Chest PA and Lateral (CXR): No results found for this visit on 04/18/24.

## 2025-04-14 ENCOUNTER — OFFICE VISIT (OUTPATIENT)
Dept: FAMILY MEDICINE CLINIC | Facility: CLINIC | Age: 35
End: 2025-04-14
Payer: COMMERCIAL

## 2025-04-14 VITALS
OXYGEN SATURATION: 96 % | HEART RATE: 89 BPM | BODY MASS INDEX: 42.27 KG/M2 | WEIGHT: 263 LBS | SYSTOLIC BLOOD PRESSURE: 110 MMHG | DIASTOLIC BLOOD PRESSURE: 70 MMHG | HEIGHT: 66 IN | TEMPERATURE: 97.4 F

## 2025-04-14 DIAGNOSIS — E66.813 CLASS 3 SEVERE OBESITY DUE TO EXCESS CALORIES WITH SERIOUS COMORBIDITY AND BODY MASS INDEX (BMI) OF 40.0 TO 44.9 IN ADULT: ICD-10-CM

## 2025-04-14 DIAGNOSIS — E78.2 MIXED HYPERLIPIDEMIA: ICD-10-CM

## 2025-04-14 DIAGNOSIS — F25.0 SCHIZOAFFECTIVE DISORDER, BIPOLAR TYPE: ICD-10-CM

## 2025-04-14 DIAGNOSIS — F51.01 PRIMARY INSOMNIA: ICD-10-CM

## 2025-04-14 DIAGNOSIS — I10 ESSENTIAL HYPERTENSION: ICD-10-CM

## 2025-04-14 DIAGNOSIS — E11.65 TYPE 2 DIABETES MELLITUS WITH HYPERGLYCEMIA, WITHOUT LONG-TERM CURRENT USE OF INSULIN: Primary | ICD-10-CM

## 2025-04-14 DIAGNOSIS — E66.01 CLASS 3 SEVERE OBESITY DUE TO EXCESS CALORIES WITH SERIOUS COMORBIDITY AND BODY MASS INDEX (BMI) OF 40.0 TO 44.9 IN ADULT: ICD-10-CM

## 2025-04-14 RX ORDER — PRAZOSIN HYDROCHLORIDE 2 MG/1
2 CAPSULE ORAL NIGHTLY
Qty: 30 CAPSULE | Refills: 0 | Status: SHIPPED | OUTPATIENT
Start: 2025-04-14

## 2025-04-14 RX ORDER — ATORVASTATIN CALCIUM 20 MG/1
20 TABLET, FILM COATED ORAL DAILY
Qty: 90 TABLET | Refills: 3 | Status: SHIPPED | OUTPATIENT
Start: 2025-04-14

## 2025-04-14 RX ORDER — ESCITALOPRAM OXALATE 10 MG/1
10 TABLET ORAL DAILY
Qty: 30 TABLET | Refills: 0 | Status: SHIPPED | OUTPATIENT
Start: 2025-04-14

## 2025-04-14 RX ORDER — LURASIDONE HYDROCHLORIDE 60 MG/1
60 TABLET, FILM COATED ORAL DAILY
Qty: 30 TABLET | Refills: 2 | Status: SHIPPED | OUTPATIENT
Start: 2025-04-14

## 2025-05-14 ENCOUNTER — TELEPHONE (OUTPATIENT)
Dept: FAMILY MEDICINE CLINIC | Facility: CLINIC | Age: 35
End: 2025-05-14

## 2025-05-14 NOTE — TELEPHONE ENCOUNTER
Hub ok to relay. Attempted to call patient about no show. Voicemail is shared and not owned by patient therefore unable to leave vm.